# Patient Record
Sex: FEMALE | Race: WHITE | NOT HISPANIC OR LATINO
[De-identification: names, ages, dates, MRNs, and addresses within clinical notes are randomized per-mention and may not be internally consistent; named-entity substitution may affect disease eponyms.]

---

## 2018-01-22 ENCOUNTER — RECORD ABSTRACTING (OUTPATIENT)
Age: 53
End: 2018-01-22

## 2018-01-22 DIAGNOSIS — Z78.9 OTHER SPECIFIED HEALTH STATUS: ICD-10-CM

## 2018-01-22 DIAGNOSIS — R07.9 CHEST PAIN, UNSPECIFIED: ICD-10-CM

## 2018-01-22 RX ORDER — MONTELUKAST SODIUM 10 MG/1
10 TABLET, FILM COATED ORAL DAILY
Refills: 0 | Status: ACTIVE | COMMUNITY

## 2018-01-22 RX ORDER — ASPIRIN 81 MG
81 TABLET, DELAYED RELEASE (ENTERIC COATED) ORAL DAILY
Refills: 0 | Status: ACTIVE | COMMUNITY

## 2018-01-30 ENCOUNTER — APPOINTMENT (OUTPATIENT)
Dept: CARDIOLOGY | Facility: CLINIC | Age: 53
End: 2018-01-30
Payer: COMMERCIAL

## 2018-01-30 VITALS
DIASTOLIC BLOOD PRESSURE: 83 MMHG | RESPIRATION RATE: 12 BRPM | SYSTOLIC BLOOD PRESSURE: 144 MMHG | HEIGHT: 64 IN | WEIGHT: 293 LBS | HEART RATE: 76 BPM | BODY MASS INDEX: 50.02 KG/M2

## 2018-01-30 DIAGNOSIS — I44.4 LEFT ANTERIOR FASCICULAR BLOCK: ICD-10-CM

## 2018-01-30 DIAGNOSIS — Z78.9 OTHER SPECIFIED HEALTH STATUS: ICD-10-CM

## 2018-01-30 PROCEDURE — 99215 OFFICE O/P EST HI 40 MIN: CPT

## 2018-01-30 PROCEDURE — 93000 ELECTROCARDIOGRAM COMPLETE: CPT

## 2018-01-30 RX ORDER — AZITHROMYCIN 250 MG/1
250 TABLET, FILM COATED ORAL
Qty: 6 | Refills: 0 | Status: DISCONTINUED | COMMUNITY
Start: 2017-12-29

## 2018-01-30 RX ORDER — INSULIN LISPRO 100 [IU]/ML
INJECTION, SOLUTION INTRAVENOUS; SUBCUTANEOUS
Refills: 0 | Status: DISCONTINUED | COMMUNITY
End: 2018-01-30

## 2018-01-30 RX ORDER — PROMETHAZINE HYDROCHLORIDE AND CODEINE PHOSPHATE 6.25; 1 MG/5ML; MG/5ML
6.25-1 SOLUTION ORAL
Qty: 120 | Refills: 0 | Status: DISCONTINUED | COMMUNITY
Start: 2017-12-29

## 2018-01-30 RX ORDER — DOXYCYCLINE 100 MG/1
100 CAPSULE ORAL
Qty: 20 | Refills: 0 | Status: DISCONTINUED | COMMUNITY
Start: 2017-12-12

## 2018-01-30 RX ORDER — AMOXICILLIN AND CLAVULANATE POTASSIUM 875; 125 MG/1; MG/1
875-125 TABLET, COATED ORAL
Qty: 14 | Refills: 0 | Status: DISCONTINUED | COMMUNITY
Start: 2017-12-01

## 2018-01-30 RX ORDER — FLUTICASONE PROPIONATE AND SALMETEROL XINAFOATE 230; 21 UG/1; UG/1
AEROSOL, METERED RESPIRATORY (INHALATION)
Refills: 0 | Status: DISCONTINUED | COMMUNITY
End: 2018-01-30

## 2018-02-09 ENCOUNTER — APPOINTMENT (OUTPATIENT)
Dept: CARDIOLOGY | Facility: CLINIC | Age: 53
End: 2018-02-09
Payer: COMMERCIAL

## 2018-02-09 PROCEDURE — 93306 TTE W/DOPPLER COMPLETE: CPT

## 2018-02-15 ENCOUNTER — APPOINTMENT (OUTPATIENT)
Dept: CARDIOLOGY | Facility: CLINIC | Age: 53
End: 2018-02-15
Payer: COMMERCIAL

## 2018-02-15 PROCEDURE — A9500: CPT

## 2018-02-15 PROCEDURE — 78452 HT MUSCLE IMAGE SPECT MULT: CPT

## 2018-02-15 PROCEDURE — 93015 CV STRESS TEST SUPVJ I&R: CPT

## 2018-02-16 RX ORDER — KIT FOR THE PREPARATION OF TECHNETIUM TC99M SESTAMIBI 1 MG/5ML
INJECTION, POWDER, LYOPHILIZED, FOR SOLUTION PARENTERAL
Refills: 0 | Status: COMPLETED | OUTPATIENT
Start: 2018-02-16

## 2018-02-16 RX ADMIN — KIT FOR THE PREPARATION OF TECHNETIUM TC99M SESTAMIBI 0: 1 INJECTION, POWDER, LYOPHILIZED, FOR SOLUTION PARENTERAL at 00:00

## 2018-02-19 ENCOUNTER — APPOINTMENT (OUTPATIENT)
Dept: CARDIOLOGY | Facility: CLINIC | Age: 53
End: 2018-02-19
Payer: COMMERCIAL

## 2018-02-19 ENCOUNTER — APPOINTMENT (OUTPATIENT)
Dept: CARDIOLOGY | Facility: CLINIC | Age: 53
End: 2018-02-19

## 2018-02-19 PROCEDURE — ZZZZZ: CPT

## 2018-02-28 ENCOUNTER — APPOINTMENT (OUTPATIENT)
Dept: CARDIOLOGY | Facility: CLINIC | Age: 53
End: 2018-02-28
Payer: COMMERCIAL

## 2018-02-28 VITALS
HEART RATE: 98 BPM | WEIGHT: 293 LBS | DIASTOLIC BLOOD PRESSURE: 82 MMHG | RESPIRATION RATE: 14 BRPM | HEIGHT: 64 IN | SYSTOLIC BLOOD PRESSURE: 128 MMHG | BODY MASS INDEX: 50.02 KG/M2

## 2018-02-28 PROCEDURE — 99214 OFFICE O/P EST MOD 30 MIN: CPT

## 2018-02-28 RX ORDER — CLINDAMYCIN HYDROCHLORIDE 300 MG/1
300 CAPSULE ORAL
Qty: 21 | Refills: 0 | Status: DISCONTINUED | COMMUNITY
Start: 2018-01-24 | End: 2018-02-28

## 2018-07-28 PROBLEM — Z78.9 ALCOHOL USE: Status: ACTIVE | Noted: 2018-01-30

## 2018-07-31 ENCOUNTER — APPOINTMENT (OUTPATIENT)
Dept: CARDIOLOGY | Facility: CLINIC | Age: 53
End: 2018-07-31

## 2018-10-04 ENCOUNTER — APPOINTMENT (OUTPATIENT)
Dept: ENDOCRINOLOGY | Facility: CLINIC | Age: 53
End: 2018-10-04

## 2018-10-04 ENCOUNTER — RECORD ABSTRACTING (OUTPATIENT)
Age: 53
End: 2018-10-04

## 2018-10-04 ENCOUNTER — APPOINTMENT (OUTPATIENT)
Dept: CARDIOLOGY | Facility: CLINIC | Age: 53
End: 2018-10-04

## 2018-10-04 RX ORDER — MULTIVITAMIN
TABLET ORAL DAILY
Refills: 0 | Status: ACTIVE | COMMUNITY

## 2018-10-09 ENCOUNTER — MOBILE ON CALL (OUTPATIENT)
Age: 53
End: 2018-10-09

## 2018-10-16 ENCOUNTER — RESULT REVIEW (OUTPATIENT)
Age: 53
End: 2018-10-16

## 2018-10-16 ENCOUNTER — RX RENEWAL (OUTPATIENT)
Age: 53
End: 2018-10-16

## 2018-10-24 ENCOUNTER — RX RENEWAL (OUTPATIENT)
Age: 53
End: 2018-10-24

## 2018-11-14 ENCOUNTER — RX RENEWAL (OUTPATIENT)
Age: 53
End: 2018-11-14

## 2018-11-20 ENCOUNTER — MEDICATION RENEWAL (OUTPATIENT)
Age: 53
End: 2018-11-20

## 2018-11-28 ENCOUNTER — MEDICATION RENEWAL (OUTPATIENT)
Age: 53
End: 2018-11-28

## 2019-01-04 ENCOUNTER — RX RENEWAL (OUTPATIENT)
Age: 54
End: 2019-01-04

## 2019-01-15 ENCOUNTER — RX RENEWAL (OUTPATIENT)
Age: 54
End: 2019-01-15

## 2019-02-01 ENCOUNTER — MEDICATION RENEWAL (OUTPATIENT)
Age: 54
End: 2019-02-01

## 2019-02-08 ENCOUNTER — NON-APPOINTMENT (OUTPATIENT)
Age: 54
End: 2019-02-08

## 2019-02-08 ENCOUNTER — APPOINTMENT (OUTPATIENT)
Dept: CARDIOLOGY | Facility: CLINIC | Age: 54
End: 2019-02-08
Payer: COMMERCIAL

## 2019-02-08 VITALS
HEIGHT: 64 IN | HEART RATE: 68 BPM | DIASTOLIC BLOOD PRESSURE: 80 MMHG | RESPIRATION RATE: 14 BRPM | BODY MASS INDEX: 40.46 KG/M2 | SYSTOLIC BLOOD PRESSURE: 142 MMHG | WEIGHT: 237 LBS

## 2019-02-08 PROCEDURE — 93000 ELECTROCARDIOGRAM COMPLETE: CPT

## 2019-02-08 PROCEDURE — 99214 OFFICE O/P EST MOD 30 MIN: CPT

## 2019-02-08 RX ORDER — PEN NEEDLE, DIABETIC 29 G X1/2"
31G X 8 MM NEEDLE, DISPOSABLE MISCELLANEOUS
Refills: 0 | Status: DISCONTINUED | COMMUNITY
End: 2019-02-08

## 2019-02-08 RX ORDER — FLUTICASONE PROPIONATE AND SALMETEROL 50; 250 UG/1; UG/1
250-50 POWDER RESPIRATORY (INHALATION)
Qty: 180 | Refills: 0 | Status: DISCONTINUED | COMMUNITY
Start: 2017-09-26 | End: 2019-02-08

## 2019-02-08 RX ORDER — POTASSIUM CHLORIDE 750 MG/1
10 TABLET, FILM COATED, EXTENDED RELEASE ORAL DAILY
Refills: 0 | Status: DISCONTINUED | COMMUNITY
Start: 2017-09-26 | End: 2019-02-08

## 2019-02-08 RX ORDER — INSULIN DETEMIR 100 [IU]/ML
100 INJECTION, SOLUTION SUBCUTANEOUS
Refills: 0 | Status: DISCONTINUED | COMMUNITY
Start: 2017-08-23 | End: 2019-02-08

## 2019-02-08 RX ORDER — GLIMEPIRIDE 4 MG/1
4 TABLET ORAL TWICE DAILY
Refills: 0 | Status: DISCONTINUED | COMMUNITY
End: 2019-02-08

## 2019-02-08 RX ORDER — INSULIN LISPRO 100 [IU]/ML
100 INJECTION, SOLUTION INTRAVENOUS; SUBCUTANEOUS
Refills: 0 | Status: DISCONTINUED | COMMUNITY
Start: 2017-08-25 | End: 2019-02-08

## 2019-02-08 RX ORDER — FUROSEMIDE 40 MG/1
40 TABLET ORAL DAILY
Qty: 90 | Refills: 3 | Status: DISCONTINUED | COMMUNITY
Start: 2017-07-07 | End: 2019-02-08

## 2019-02-08 NOTE — ASSESSMENT
[FreeTextEntry1] : EKG shows normal sinus rhythm with left axis deviation, poor R wave progression across the anterior leads essentially unchanged from previous;\par \par In summary the patient is a 53-year-old woman with a prior long-standing history of obesity and status post gastric sleeve bariatric surgery one year ago who has loss of moderate amount of weight (60 pounds) she has had some nondescript anterior chest tightening which seems more anxiety musculoskeletal related and not particularly exertional related.\par \par Plan:\par \par  patient on the importance of getting back on structured nutritional weight loss plan and following up with weight loss surgeon\par \par Recommend patient continue to participate in exercise regimen at the gym for cardiovascular conditioning and reducing stress\par \par Followup in this office within 4-5 months or p.r.n.\par \par Patient to report any change or additional untoward chest symptoms between now and then\par \par They also decrease her enteric-coated aspirin to 3 times per week one tab;;;;

## 2019-02-08 NOTE — HISTORY OF PRESENT ILLNESS
[FreeTextEntry1] : She also notes that her blood pressure sometimes has been borderline high despite still taking medication for this. She discontinued was taken off of her diabetes medication after the weight loss;\par \par She reports she tried to get more serious with her diet again and starting to do some exercise in the gym\par \par nuclear stress test - preop in January 2018 was negative for ischemia;

## 2019-02-08 NOTE — REASON FOR VISIT
[Follow-Up - Clinic] : a clinic follow-up of [FreeTextEntry1] : The patient is a 53-year-old white female who has been evaluated in our office in the past for a long-standing history of morbid obesity for which she was cleared to undergo bariatric surgery approximately one year ago.;\par \par Fortunately, she has done well and has lost some 60 pounds but seems to have plateaued in her weight loss.\par \par She states she gets some intermittent anterior chest tightness-like feeling especially if she feels rushed or anxious very itchy has been under some increased stress lately at work and recently  from her boyfriend she reports;\par \par

## 2019-02-08 NOTE — PHYSICAL EXAM
[Normal Conjunctiva] : the conjunctiva exhibited no abnormalities [Eyelids - No Xanthelasma] : the eyelids demonstrated no xanthelasmas [Normal Oral Mucosa] : normal oral mucosa [No Oral Pallor] : no oral pallor [No Oral Cyanosis] : no oral cyanosis [Normal Jugular Venous A Waves Present] : normal jugular venous A waves present [Normal Jugular Venous V Waves Present] : normal jugular venous V waves present [No Jugular Venous Vaughn A Waves] : no jugular venous vaughn A waves [Respiration, Rhythm And Depth] : normal respiratory rhythm and effort [Exaggerated Use Of Accessory Muscles For Inspiration] : no accessory muscle use [Auscultation Breath Sounds / Voice Sounds] : lungs were clear to auscultation bilaterally [Veins - Varicosity Changes] : no varicosital changes were noted in the lower extremities [FreeTextEntry1] : Obese soft, nontender [Abnormal Walk] : normal gait [Gait - Sufficient For Exercise Testing] : the gait was sufficient for exercise testing [Nail Clubbing] : no clubbing of the fingernails [Cyanosis, Localized] : no localized cyanosis [Petechial Hemorrhages (___cm)] : no petechial hemorrhages [Skin Color & Pigmentation] : normal skin color and pigmentation [] : no rash [No Venous Stasis] : no venous stasis [Skin Lesions] : no skin lesions [No Skin Ulcers] : no skin ulcer [No Xanthoma] : no  xanthoma was observed [Oriented To Time, Place, And Person] : oriented to person, place, and time [Affect] : the affect was normal [Mood] : the mood was normal [No Anxiety] : not feeling anxious

## 2019-02-10 ENCOUNTER — RX RENEWAL (OUTPATIENT)
Age: 54
End: 2019-02-10

## 2019-02-25 ENCOUNTER — APPOINTMENT (OUTPATIENT)
Dept: ENDOCRINOLOGY | Facility: CLINIC | Age: 54
End: 2019-02-25

## 2019-03-19 ENCOUNTER — RECORD ABSTRACTING (OUTPATIENT)
Age: 54
End: 2019-03-19

## 2019-03-20 ENCOUNTER — APPOINTMENT (OUTPATIENT)
Dept: ENDOCRINOLOGY | Facility: CLINIC | Age: 54
End: 2019-03-20
Payer: COMMERCIAL

## 2019-03-20 ENCOUNTER — RESULT CHARGE (OUTPATIENT)
Age: 54
End: 2019-03-20

## 2019-03-20 VITALS
DIASTOLIC BLOOD PRESSURE: 80 MMHG | BODY MASS INDEX: 40.97 KG/M2 | SYSTOLIC BLOOD PRESSURE: 140 MMHG | WEIGHT: 240 LBS | HEIGHT: 64 IN | HEART RATE: 66 BPM

## 2019-03-20 DIAGNOSIS — E55.9 VITAMIN D DEFICIENCY, UNSPECIFIED: ICD-10-CM

## 2019-03-20 LAB — GLUCOSE BLDC GLUCOMTR-MCNC: 218

## 2019-03-20 PROCEDURE — 82962 GLUCOSE BLOOD TEST: CPT

## 2019-03-20 PROCEDURE — 99214 OFFICE O/P EST MOD 30 MIN: CPT | Mod: 25

## 2019-03-20 NOTE — HISTORY OF PRESENT ILLNESS
[FreeTextEntry1] : Pt here for follow up  T2 Dm HT high hcol \par s/p gastric sleeve for obesity \par \par HAs not been checking B S\par  hasn’t been watching diet or exercsing as well as she should \par meter broke \par no symptom of low BS

## 2019-03-20 NOTE — PHYSICAL EXAM
[Alert] : alert [No Acute Distress] : no acute distress [Well Nourished] : well nourished [Well Developed] : well developed [Normal Sclera/Conjunctiva] : normal sclera/conjunctiva [EOMI] : extra ocular movement intact [Thyroid Not Enlarged] : the thyroid was not enlarged [No Thyroid Nodules] : there were no palpable thyroid nodules [No Respiratory Distress] : no respiratory distress [No Accessory Muscle Use] : no accessory muscle use [Clear to Auscultation] : lungs were clear to auscultation bilaterally [Normal Rate] : heart rate was normal  [Normal S1, S2] : normal S1 and S2 [Regular Rhythm] : with a regular rhythm [Pedal Pulses Normal] : the pedal pulses are present [No Edema] : there was no peripheral edema [Post Cervical Nodes] : posterior cervical nodes [Anterior Cervical Nodes] : anterior cervical nodes [Normal] : normal and non tender [No Stigmata of Cushings Syndrome] : no stigmata of cushings syndrome [Normal Gait] : normal gait [Normal Strength/Tone] : muscle strength and tone were normal [No Rash] : no rash [Acanthosis Nigricans] : no acanthosis nigricans [Normal Reflexes] : deep tendon reflexes were 2+ and symmetric [No Tremors] : no tremors [Oriented x3] : oriented to person, place, and time

## 2019-03-20 NOTE — ASSESSMENT
[FreeTextEntry1] : T2DM - with A1c 8.1 suboptima control \par willrsume testing bid ac and qhs - \par cont MFN 1000 mg bid\par  see opthalmology\par \par Hypokalemia- reusme Klor 10 meq qd \par check repeat BMP in 2 weeks \par \par Hypothyroid OCnt RX \par low Vit D - cont 50 K per week \par proteinuira will repeat if incrsing will see renal \par \par low platelts- been seeing hematology

## 2019-03-27 ENCOUNTER — RX RENEWAL (OUTPATIENT)
Age: 54
End: 2019-03-27

## 2019-04-09 ENCOUNTER — RX RENEWAL (OUTPATIENT)
Age: 54
End: 2019-04-09

## 2019-06-24 ENCOUNTER — APPOINTMENT (OUTPATIENT)
Dept: CARDIOLOGY | Facility: CLINIC | Age: 54
End: 2019-06-24
Payer: COMMERCIAL

## 2019-06-24 PROCEDURE — 93306 TTE W/DOPPLER COMPLETE: CPT

## 2019-07-25 ENCOUNTER — APPOINTMENT (OUTPATIENT)
Dept: CARDIOLOGY | Facility: CLINIC | Age: 54
End: 2019-07-25
Payer: COMMERCIAL

## 2019-07-25 ENCOUNTER — NON-APPOINTMENT (OUTPATIENT)
Age: 54
End: 2019-07-25

## 2019-07-25 VITALS
HEART RATE: 77 BPM | DIASTOLIC BLOOD PRESSURE: 76 MMHG | BODY MASS INDEX: 40.97 KG/M2 | RESPIRATION RATE: 14 BRPM | SYSTOLIC BLOOD PRESSURE: 134 MMHG | HEIGHT: 64 IN | WEIGHT: 240 LBS

## 2019-07-25 DIAGNOSIS — R09.89 OTHER SPECIFIED SYMPTOMS AND SIGNS INVOLVING THE CIRCULATORY AND RESPIRATORY SYSTEMS: ICD-10-CM

## 2019-07-25 PROCEDURE — 99214 OFFICE O/P EST MOD 30 MIN: CPT

## 2019-07-25 PROCEDURE — 93000 ELECTROCARDIOGRAM COMPLETE: CPT

## 2019-07-25 RX ORDER — INSULIN LISPRO 100 [IU]/ML
100 INJECTION, SOLUTION INTRAVENOUS; SUBCUTANEOUS
Refills: 0 | Status: DISCONTINUED | COMMUNITY
End: 2019-07-25

## 2019-07-25 RX ORDER — POTASSIUM CHLORIDE 750 MG/1
10 TABLET, EXTENDED RELEASE ORAL
Refills: 0 | Status: DISCONTINUED | COMMUNITY
End: 2019-07-25

## 2019-07-25 RX ORDER — POTASSIUM CHLORIDE 1.5 G/1.58G
20 POWDER, FOR SOLUTION ORAL DAILY
Qty: 30 | Refills: 3 | Status: DISCONTINUED | COMMUNITY
Start: 2019-03-21 | End: 2019-07-25

## 2019-07-25 RX ORDER — POTASSIUM CHLORIDE 750 MG/1
10 TABLET, FILM COATED, EXTENDED RELEASE ORAL
Refills: 0 | Status: DISCONTINUED | COMMUNITY
End: 2019-07-25

## 2019-07-25 RX ORDER — LEVOTHYROXINE SODIUM 50 UG/1
50 TABLET ORAL
Qty: 90 | Refills: 0 | Status: DISCONTINUED | COMMUNITY
Start: 2018-10-24 | End: 2019-07-25

## 2019-07-25 RX ORDER — INSULIN DETEMIR 100 [IU]/ML
100 INJECTION, SOLUTION SUBCUTANEOUS
Refills: 0 | Status: DISCONTINUED | COMMUNITY
End: 2019-07-25

## 2019-07-25 NOTE — REASON FOR VISIT
[Follow-Up - Clinic] : a clinic follow-up of [FreeTextEntry1] : The patient is a 54-year-old white female with a known history for long-standing history of morbid obesity and prior bariatric surgery the lowest initially a moderate amount of weight and then somewhat plateaued. She presents in followup the office for general cardiac checkup. She has been treated for hypertension and hyperlipidemia ;\par \par Currently, she denies any significant symptoms of shortness of breath, palpitations or dizziness\par \par She describes some rather atypical upper right parasternal chest discomfort that comes and goes at times it is not particularly exertional related;;

## 2019-07-25 NOTE — PHYSICAL EXAM
[Normal Conjunctiva] : the conjunctiva exhibited no abnormalities [Eyelids - No Xanthelasma] : the eyelids demonstrated no xanthelasmas [Normal Oral Mucosa] : normal oral mucosa [No Oral Pallor] : no oral pallor [No Oral Cyanosis] : no oral cyanosis [Normal Jugular Venous A Waves Present] : normal jugular venous A waves present [Normal Jugular Venous V Waves Present] : normal jugular venous V waves present [Respiration, Rhythm And Depth] : normal respiratory rhythm and effort [Exaggerated Use Of Accessory Muscles For Inspiration] : no accessory muscle use [Auscultation Breath Sounds / Voice Sounds] : lungs were clear to auscultation bilaterally [Veins - Varicosity Changes] : no varicosital changes were noted in the lower extremities [FreeTextEntry1] : Obese soft, nontender [Abnormal Walk] : normal gait [Gait - Sufficient For Exercise Testing] : the gait was sufficient for exercise testing [Nail Clubbing] : no clubbing of the fingernails [Cyanosis, Localized] : no localized cyanosis [Petechial Hemorrhages (___cm)] : no petechial hemorrhages [Skin Color & Pigmentation] : normal skin color and pigmentation [] : no rash [No Venous Stasis] : no venous stasis [Skin Lesions] : no skin lesions [No Skin Ulcers] : no skin ulcer [No Xanthoma] : no  xanthoma was observed [Oriented To Time, Place, And Person] : oriented to person, place, and time [Affect] : the affect was normal [Mood] : the mood was normal [No Anxiety] : not feeling anxious

## 2019-07-25 NOTE — HISTORY OF PRESENT ILLNESS
[FreeTextEntry1] : She is tolerating her current medical regimen without difficulty\par \par She does not appear to be well motivated to lose any additional weight;\par \par Recent transthoracic echocardiogram demonstrated borderline left atrial enlargement, preserved LV size and systolic function with normal ejection fraction 65-70%, there was some mild to moderate MR and mild TR and PI also seen;\par \par ;

## 2019-07-25 NOTE — ASSESSMENT
[FreeTextEntry1] : EKG shows normal sinus rhythm at a rate of 77 with left anterior fascicular block and poor R-wave progression across the precordial leads essentially unchanged;\par \par In summary the patient is a 54-year-old woman with long-standing history of obesity and prior history of bariatric surgery with limited weight loss success and now plateauing with her weight\par \par She has had a stable cardiac pattern and atypical chest discomfort appears more musculoskeletal or arthritic;\par \par Plan:\par \par \par Patient recommended to follow  a more structured low-carb weight reducing diet as discussed at length today\par \par Recommend patient continue current medical regimen;\par \par Timely checkups and laboratory blood tests with primary care encouraged\par \par We'll schedule carotid duplex study sometime in the near future to rule out any significant carotid plaquing stenosis;\par \par Followup in this office as well within 4-5 months or p.r.n.;;;

## 2019-08-20 LAB
HBA1C MFR BLD HPLC: 8.1
LDLC SERPL DIRECT ASSAY-MCNC: 136
MICROALBUMIN/CREAT 24H UR-RTO: 282

## 2019-08-21 ENCOUNTER — APPOINTMENT (OUTPATIENT)
Dept: ENDOCRINOLOGY | Facility: CLINIC | Age: 54
End: 2019-08-21
Payer: COMMERCIAL

## 2019-11-06 ENCOUNTER — RX RENEWAL (OUTPATIENT)
Age: 54
End: 2019-11-06

## 2019-11-25 ENCOUNTER — RESULT REVIEW (OUTPATIENT)
Age: 54
End: 2019-11-25

## 2019-11-26 LAB
HBA1C MFR BLD HPLC: 9.5
LDLC SERPL DIRECT ASSAY-MCNC: 112
MICROALBUMIN/CREAT 24H UR-RTO: 22

## 2019-11-27 ENCOUNTER — APPOINTMENT (OUTPATIENT)
Dept: ENDOCRINOLOGY | Facility: CLINIC | Age: 54
End: 2019-11-27
Payer: COMMERCIAL

## 2019-11-27 ENCOUNTER — RESULT CHARGE (OUTPATIENT)
Age: 54
End: 2019-11-27

## 2019-11-27 VITALS — HEIGHT: 64 IN | HEART RATE: 73 BPM | SYSTOLIC BLOOD PRESSURE: 140 MMHG | DIASTOLIC BLOOD PRESSURE: 80 MMHG

## 2019-11-27 PROCEDURE — 99214 OFFICE O/P EST MOD 30 MIN: CPT | Mod: 25

## 2019-11-27 PROCEDURE — 82962 GLUCOSE BLOOD TEST: CPT

## 2019-11-27 RX ORDER — FUROSEMIDE 40 MG/1
40 TABLET ORAL
Refills: 0 | Status: DISCONTINUED | COMMUNITY
End: 2019-11-27

## 2019-11-28 LAB — GLUCOSE BLDC GLUCOMTR-MCNC: 167

## 2019-11-29 NOTE — PHYSICAL EXAM
[Alert] : alert [No Acute Distress] : no acute distress [Well Nourished] : well nourished [Well Developed] : well developed [EOMI] : extra ocular movement intact [Normal Sclera/Conjunctiva] : normal sclera/conjunctiva [Thyroid Not Enlarged] : the thyroid was not enlarged [No Thyroid Nodules] : there were no palpable thyroid nodules [No Respiratory Distress] : no respiratory distress [No Accessory Muscle Use] : no accessory muscle use [Clear to Auscultation] : lungs were clear to auscultation bilaterally [Normal Rate] : heart rate was normal  [Regular Rhythm] : with a regular rhythm [Normal S1, S2] : normal S1 and S2 [Pedal Pulses Normal] : the pedal pulses are present [No Edema] : there was no peripheral edema [Post Cervical Nodes] : posterior cervical nodes [Anterior Cervical Nodes] : anterior cervical nodes [Normal] : normal and non tender [No Stigmata of Cushings Syndrome] : no stigmata of cushings syndrome [Normal Gait] : normal gait [Normal Strength/Tone] : muscle strength and tone were normal [No Rash] : no rash [Acanthosis Nigricans] : no acanthosis nigricans [Normal Reflexes] : deep tendon reflexes were 2+ and symmetric [Oriented x3] : oriented to person, place, and time [No Tremors] : no tremors

## 2019-11-29 NOTE — ASSESSMENT
[FreeTextEntry1] : T2DM - with A1c 8.1 suboptima control \par willrsume testing bid ac and qhs - \par cont MFN 1000 mg bid\par  see opthalmology\par \par Hypokalemia- reusme Klor 8 meq qd \par check repeat BMP in 2 weeks \par \par Hypothyroid OCnt RX \par low Vit D - cont 50 K per week \par proteinuira will repeat if incrsing will see renal \par \par low platelts- been seeing hematology

## 2020-01-06 ENCOUNTER — APPOINTMENT (OUTPATIENT)
Dept: CARDIOLOGY | Facility: CLINIC | Age: 55
End: 2020-01-06
Payer: COMMERCIAL

## 2020-01-06 PROCEDURE — 93880 EXTRACRANIAL BILAT STUDY: CPT

## 2020-01-09 ENCOUNTER — APPOINTMENT (OUTPATIENT)
Dept: CARDIOLOGY | Facility: CLINIC | Age: 55
End: 2020-01-09

## 2020-01-31 ENCOUNTER — OUTPATIENT (OUTPATIENT)
Dept: OUTPATIENT SERVICES | Facility: HOSPITAL | Age: 55
LOS: 1 days | End: 2020-01-31
Payer: COMMERCIAL

## 2020-01-31 VITALS
OXYGEN SATURATION: 97 % | HEIGHT: 64 IN | HEART RATE: 73 BPM | TEMPERATURE: 98 F | DIASTOLIC BLOOD PRESSURE: 83 MMHG | RESPIRATION RATE: 18 BRPM | WEIGHT: 231.93 LBS | SYSTOLIC BLOOD PRESSURE: 135 MMHG

## 2020-01-31 DIAGNOSIS — E11.9 TYPE 2 DIABETES MELLITUS WITHOUT COMPLICATIONS: ICD-10-CM

## 2020-01-31 DIAGNOSIS — N84.0 POLYP OF CORPUS UTERI: ICD-10-CM

## 2020-01-31 DIAGNOSIS — Z90.89 ACQUIRED ABSENCE OF OTHER ORGANS: Chronic | ICD-10-CM

## 2020-01-31 DIAGNOSIS — Z98.84 BARIATRIC SURGERY STATUS: Chronic | ICD-10-CM

## 2020-01-31 LAB
ANION GAP SERPL CALC-SCNC: 12 MMOL/L — SIGNIFICANT CHANGE UP (ref 5–17)
BUN SERPL-MCNC: 18 MG/DL — SIGNIFICANT CHANGE UP (ref 7–23)
CALCIUM SERPL-MCNC: 9.3 MG/DL — SIGNIFICANT CHANGE UP (ref 8.4–10.5)
CHLORIDE SERPL-SCNC: 99 MMOL/L — SIGNIFICANT CHANGE UP (ref 96–108)
CO2 SERPL-SCNC: 27 MMOL/L — SIGNIFICANT CHANGE UP (ref 22–31)
CREAT SERPL-MCNC: 0.61 MG/DL — SIGNIFICANT CHANGE UP (ref 0.5–1.3)
GLUCOSE SERPL-MCNC: 300 MG/DL — HIGH (ref 70–99)
HBA1C BLD-MCNC: 9.2 % — HIGH (ref 4–5.6)
HCT VFR BLD CALC: 39.9 % — SIGNIFICANT CHANGE UP (ref 34.5–45)
HGB BLD-MCNC: 13.2 G/DL — SIGNIFICANT CHANGE UP (ref 11.5–15.5)
MCHC RBC-ENTMCNC: 28.8 PG — SIGNIFICANT CHANGE UP (ref 27–34)
MCHC RBC-ENTMCNC: 33.1 GM/DL — SIGNIFICANT CHANGE UP (ref 32–36)
MCV RBC AUTO: 86.9 FL — SIGNIFICANT CHANGE UP (ref 80–100)
NRBC # BLD: 0 /100 WBCS — SIGNIFICANT CHANGE UP (ref 0–0)
PLATELET # BLD AUTO: 176 K/UL — SIGNIFICANT CHANGE UP (ref 150–400)
POTASSIUM SERPL-MCNC: 3.5 MMOL/L — SIGNIFICANT CHANGE UP (ref 3.5–5.3)
POTASSIUM SERPL-SCNC: 3.5 MMOL/L — SIGNIFICANT CHANGE UP (ref 3.5–5.3)
RBC # BLD: 4.59 M/UL — SIGNIFICANT CHANGE UP (ref 3.8–5.2)
RBC # FLD: 12.4 % — SIGNIFICANT CHANGE UP (ref 10.3–14.5)
SODIUM SERPL-SCNC: 138 MMOL/L — SIGNIFICANT CHANGE UP (ref 135–145)
WBC # BLD: 5.05 K/UL — SIGNIFICANT CHANGE UP (ref 3.8–10.5)
WBC # FLD AUTO: 5.05 K/UL — SIGNIFICANT CHANGE UP (ref 3.8–10.5)

## 2020-01-31 PROCEDURE — 83036 HEMOGLOBIN GLYCOSYLATED A1C: CPT

## 2020-01-31 PROCEDURE — 85027 COMPLETE CBC AUTOMATED: CPT

## 2020-01-31 PROCEDURE — G0463: CPT

## 2020-01-31 PROCEDURE — 80048 BASIC METABOLIC PNL TOTAL CA: CPT

## 2020-01-31 NOTE — H&P PST ADULT - HISTORY OF PRESENT ILLNESS
53 y/o F PMH 53 y/o F PMH morbid obesity, S/P vertical sleeve gastrectomy 2018, diabetes, states "I think my last A1c was 9 or 10", mild AQUILES, does not wear CPAP, endometrial hyperplasia.  Presents today for D+C, operative hysteroscopy, removal of endometrial polyp.

## 2020-01-31 NOTE — H&P PST ADULT - NSICDXPROBLEM_GEN_ALL_CORE_FT
PROBLEM DIAGNOSES  Problem: Polyp of corpus uteri  Assessment and Plan: D+C., operative hysteroscopy, removal of endometrial polyp.  Will continue aspirin    Problem: Diabetes  Assessment and Plan: Instructed to hold metformin 24 hours prior to pro

## 2020-01-31 NOTE — H&P PST ADULT - NSICDXPASTMEDICALHX_GEN_ALL_CORE_FT
PAST MEDICAL HISTORY:  Asthma well controlled    Depression     Diabetes     HTN (hypertension)     Hypokalemia     Hypothyroid     Morbidly obese PAST MEDICAL HISTORY:  Asthma well controlled    Depression     Diabetes     HTN (hypertension)     Hypokalemia     Hypothyroid     Morbidly obese     AQUILES (obstructive sleep apnea)

## 2020-03-16 ENCOUNTER — APPOINTMENT (OUTPATIENT)
Dept: CARDIOLOGY | Facility: CLINIC | Age: 55
End: 2020-03-16

## 2020-03-26 ENCOUNTER — RX RENEWAL (OUTPATIENT)
Age: 55
End: 2020-03-26

## 2020-04-23 ENCOUNTER — RX RENEWAL (OUTPATIENT)
Age: 55
End: 2020-04-23

## 2020-04-24 ENCOUNTER — APPOINTMENT (OUTPATIENT)
Dept: ENDOCRINOLOGY | Facility: CLINIC | Age: 55
End: 2020-04-24
Payer: COMMERCIAL

## 2020-04-24 PROCEDURE — 99442: CPT

## 2020-04-29 PROBLEM — E66.01 MORBID (SEVERE) OBESITY DUE TO EXCESS CALORIES: Chronic | Status: ACTIVE | Noted: 2020-01-31

## 2020-04-29 PROBLEM — E87.6 HYPOKALEMIA: Chronic | Status: ACTIVE | Noted: 2020-01-31

## 2020-04-29 PROBLEM — J45.909 UNSPECIFIED ASTHMA, UNCOMPLICATED: Chronic | Status: ACTIVE | Noted: 2020-01-31

## 2020-04-29 PROBLEM — F32.9 MAJOR DEPRESSIVE DISORDER, SINGLE EPISODE, UNSPECIFIED: Chronic | Status: ACTIVE | Noted: 2020-01-31

## 2020-04-29 PROBLEM — G47.33 OBSTRUCTIVE SLEEP APNEA (ADULT) (PEDIATRIC): Chronic | Status: ACTIVE | Noted: 2020-01-31

## 2020-04-29 PROBLEM — E03.9 HYPOTHYROIDISM, UNSPECIFIED: Chronic | Status: ACTIVE | Noted: 2020-01-31

## 2020-05-05 ENCOUNTER — RX RENEWAL (OUTPATIENT)
Age: 55
End: 2020-05-05

## 2020-07-27 ENCOUNTER — RX RENEWAL (OUTPATIENT)
Age: 55
End: 2020-07-27

## 2020-08-10 RX ORDER — ERGOCALCIFEROL 1.25 MG/1
1.25 MG CAPSULE, LIQUID FILLED ORAL
Qty: 12 | Refills: 1 | Status: DISCONTINUED | COMMUNITY
Start: 2017-10-05 | End: 2020-08-10

## 2020-08-10 RX ORDER — LANCETS 33 GAUGE
EACH MISCELLANEOUS
Qty: 300 | Refills: 1 | Status: DISCONTINUED | COMMUNITY
Start: 2019-03-21 | End: 2020-08-10

## 2020-09-08 LAB
HBA1C MFR BLD HPLC: 6.1
LDLC SERPL DIRECT ASSAY-MCNC: 114
MICROALBUMIN/CREAT 24H UR-RTO: 14

## 2020-09-09 ENCOUNTER — APPOINTMENT (OUTPATIENT)
Dept: ENDOCRINOLOGY | Facility: CLINIC | Age: 55
End: 2020-09-09
Payer: COMMERCIAL

## 2020-09-09 ENCOUNTER — RESULT CHARGE (OUTPATIENT)
Age: 55
End: 2020-09-09

## 2020-09-09 VITALS — DIASTOLIC BLOOD PRESSURE: 78 MMHG | HEART RATE: 79 BPM | SYSTOLIC BLOOD PRESSURE: 138 MMHG

## 2020-09-09 VITALS — WEIGHT: 248 LBS | HEIGHT: 64 IN | BODY MASS INDEX: 42.34 KG/M2

## 2020-09-09 PROCEDURE — 82962 GLUCOSE BLOOD TEST: CPT

## 2020-09-09 PROCEDURE — 99214 OFFICE O/P EST MOD 30 MIN: CPT | Mod: 25

## 2020-09-10 LAB — GLUCOSE BLDC GLUCOMTR-MCNC: 113

## 2020-09-13 NOTE — PHYSICAL EXAM
[Alert] : alert [Well Nourished] : well nourished [No Acute Distress] : no acute distress [Well Developed] : well developed [Normal Sclera/Conjunctiva] : normal sclera/conjunctiva [EOMI] : extra ocular movement intact [No Proptosis] : no proptosis [No Thyroid Nodules] : no palpable thyroid nodules [No Respiratory Distress] : no respiratory distress [No Accessory Muscle Use] : no accessory muscle use [Clear to Auscultation] : lungs were clear to auscultation bilaterally [Normal S1, S2] : normal S1 and S2 [Normal Rate] : heart rate was normal [Regular Rhythm] : with a regular rhythm [No Edema] : no peripheral edema [Pedal Pulses Normal] : the pedal pulses are present [Normal Anterior Cervical Nodes] : no anterior cervical lymphadenopathy [Normal Posterior Cervical Nodes] : no posterior cervical lymphadenopathy [No Spinal Tenderness] : no spinal tenderness [Spine Straight] : spine straight [No Stigmata of Cushings Syndrome] : no stigmata of Cushings Syndrome [Normal Gait] : normal gait [Normal Strength/Tone] : muscle strength and tone were normal [No Rash] : no rash [Acanthosis Nigricans] : no acanthosis nigricans [Right Foot Was Examined] : right foot ~C was examined [Left Foot Was Examined] : left foot ~C was examined [Normal] : normal [Full ROM] : with full range of motion [Diminished Throughout Both Feet] : normal tactile sensation with monofilament testing throughout both feet [Normal Reflexes] : deep tendon reflexes were 2+ and symmetric [No Tremors] : no tremors [Oriented x3] : oriented to person, place, and time [de-identified] : mild thyromegaly

## 2020-09-13 NOTE — HISTORY OF PRESENT ILLNESS
[FreeTextEntry1] : Phone visit done  due to COVID 19 \par  Pt consent obtained for phone visit \par \par Time started:400pm\par Time ended :411pm \par \par HPI\par \par Follow up for \par \par T2DM on  amaryl 2 mg bid\par  MFN 1000 mg bid \par \par \par Hypothyroidism on LT4 0.05 mg qd \par Low Vit D on 50 K per week \par \par \par Glucose ranges:\par 130-150 \par Hypoglycemia :\par none \par \par Follow ups:\par \par Opthalmology\par Podiatry  \par Cardiology has seen cardiollogy  about 1 motnht ago \par  pt had been gettign CP at bed \par Renal \par \par ROS No Neck pain No voice changes  No Chest pain  No Pressure  No palpitations No dyspnea   No n/v abd pain diarrhea or constipation  rare LE edema \par \par Labs Reviewed \par \par \par Imaging reviewed\par \par \par Imp/Plan \par \par \par T2Dm \par contorl improving \par  cont current RX\par  cont checking BS \par  follow up t2DM \par Hypothryoid  \par  Low Vit  D \par \par has been doing better trying to watch diet and exercise \par \par fell down steps about1-2 weeks ago  thinks hit back of head   was wearign shoes that had a wet bottom \par \par getting better  no LOC \par \par \par BS have been ok -  excep aroun 4-5 PM  feels  lw sometimes

## 2020-09-13 NOTE — ASSESSMENT
[FreeTextEntry1] : T2Dm  m\par much improved contreol with some PM Low BS\par reduce aamryl  to 1/2 tab Sharifa '\par  cont full tab in PM\par  cont  MFN  1000  mg bid \par \par check sono  Jan 2021\par \par \par  Hypothrydoi Cont RX  0.05 MG SYnthoroid \par \par Low B12 cont RX \par \par Low Vit D cont RX \par \par  opjhthal - needs follow up \par Low Vit D cont  50 K per week \par chol- see card

## 2020-09-23 ENCOUNTER — NON-APPOINTMENT (OUTPATIENT)
Age: 55
End: 2020-09-23

## 2020-09-23 ENCOUNTER — APPOINTMENT (OUTPATIENT)
Dept: CARDIOLOGY | Facility: CLINIC | Age: 55
End: 2020-09-23
Payer: COMMERCIAL

## 2020-09-23 VITALS
DIASTOLIC BLOOD PRESSURE: 70 MMHG | BODY MASS INDEX: 42.34 KG/M2 | RESPIRATION RATE: 14 BRPM | SYSTOLIC BLOOD PRESSURE: 130 MMHG | HEART RATE: 67 BPM | WEIGHT: 248 LBS | TEMPERATURE: 97.5 F | HEIGHT: 64 IN

## 2020-09-23 PROCEDURE — 93000 ELECTROCARDIOGRAM COMPLETE: CPT

## 2020-09-23 PROCEDURE — 99214 OFFICE O/P EST MOD 30 MIN: CPT

## 2020-09-23 RX ORDER — CLOTRIMAZOLE AND BETAMETHASONE DIPROPIONATE 10; .5 MG/G; MG/G
1-0.05 CREAM TOPICAL
Qty: 15 | Refills: 0 | Status: DISCONTINUED | COMMUNITY
Start: 2016-12-05 | End: 2020-09-23

## 2020-09-23 NOTE — HISTORY OF PRESENT ILLNESS
[FreeTextEntry1] : She is tolerating her current medical regimen without difficulty\par \par She does not appear to be well motivated to lose any additional weight although she is interested in eating a more well balanced diet and begin a modest exercise regimen.\par \par Mrs. Call is following up with her PCP regularly and completing routine blood work.  Her cholesterol levels are stable and her HgA1C is now controlled on Metformin and Glimepiride.

## 2020-09-23 NOTE — REASON FOR VISIT
[FreeTextEntry1] : MICHAEL CRISOSTOMO is being seen for a clinic follow-up of. \par \par The patient is a 55-year-old white female with a known history for long-standing history of morbid obesity and prior bariatric surgery the lowest initially a moderate amount of weight and then somewhat plateaued. She presents in followup the office for general cardiac checkup. She has been treated for hypertension and hyperlipidemia ;\par \par Currently, she denies any significant symptoms of chest pain, shortness of breath, palpitations or dizziness.

## 2020-09-23 NOTE — ASSESSMENT
[FreeTextEntry1] : EKG 9/23/2020:  The EKG illustrates sinus rhythm, rate of 67 bpm, left atrial enlargement pattern, left anterior fascicular block, poor R wave progression V1 to V3. \par \par carotid doppler study January 2020 was negative for any significant atherosclerotic plaquing. \par \par nuclear stress test - preop in January 2018 was negative for ischemia; \par \par

## 2020-09-23 NOTE — DISCUSSION/SUMMARY
[FreeTextEntry1] : 1).  She will complete a Transthoracic Echocardiogram in the near future to assess cardiac function and valvulopathy.\par \par 2).  Patient is tolerating cardiac medications without negative side effects, continue with current cardiac medication regimen (refer above).\par \par 3).  Counseled patient on diet and lifestyle modification including low fat and low carbohydrate weight reducing diet.  She is to implement a modest aerobic exercise regimen few days per week. \par \par 4).  Follow up with PCP (Chio Hernandes) regarding routine checkups and blood work including fasting lipid panel, have copy faxed to our office. \par \par 5).  Recommend patient report any untoward symptoms. \par \par 6).  Follow up with our office in 5 to 6 months or PRN.

## 2020-09-23 NOTE — PHYSICAL EXAM
[Normal Appearance] : normal appearance [General Appearance - In No Acute Distress] : no acute distress [Normal Conjunctiva] : the conjunctiva exhibited no abnormalities [Normal Oral Mucosa] : normal oral mucosa [Normal Jugular Venous A Waves Present] : normal jugular venous A waves present [Normal Jugular Venous V Waves Present] : normal jugular venous V waves present [No Jugular Venous Vaughn A Waves] : no jugular venous vaughn A waves [] : no respiratory distress [Respiration, Rhythm And Depth] : normal respiratory rhythm and effort [Auscultation Breath Sounds / Voice Sounds] : lungs were clear to auscultation bilaterally [Heart Rate And Rhythm] : heart rate and rhythm were normal [Heart Sounds] : normal S1 and S2 [Edema] : no peripheral edema present [Bowel Sounds] : normal bowel sounds [Abnormal Walk] : normal gait [Nail Clubbing] : no clubbing of the fingernails [Cyanosis, Localized] : no localized cyanosis [Skin Color & Pigmentation] : normal skin color and pigmentation [Oriented To Time, Place, And Person] : oriented to person, place, and time [Impaired Insight] : insight and judgment were intact [Affect] : the affect was normal [FreeTextEntry1] : Grade II/VI systolic murmur

## 2020-10-09 ENCOUNTER — RX RENEWAL (OUTPATIENT)
Age: 55
End: 2020-10-09

## 2020-11-05 ENCOUNTER — RX RENEWAL (OUTPATIENT)
Age: 55
End: 2020-11-05

## 2020-11-16 ENCOUNTER — RESULT REVIEW (OUTPATIENT)
Age: 55
End: 2020-11-16

## 2020-11-30 ENCOUNTER — APPOINTMENT (OUTPATIENT)
Dept: CARDIOLOGY | Facility: CLINIC | Age: 55
End: 2020-11-30
Payer: COMMERCIAL

## 2020-11-30 PROCEDURE — 93306 TTE W/DOPPLER COMPLETE: CPT

## 2020-11-30 PROCEDURE — 99072 ADDL SUPL MATRL&STAF TM PHE: CPT

## 2020-12-30 ENCOUNTER — RX RENEWAL (OUTPATIENT)
Age: 55
End: 2020-12-30

## 2021-01-25 ENCOUNTER — RX RENEWAL (OUTPATIENT)
Age: 56
End: 2021-01-25

## 2021-02-23 ENCOUNTER — NON-APPOINTMENT (OUTPATIENT)
Age: 56
End: 2021-02-23

## 2021-02-24 ENCOUNTER — APPOINTMENT (OUTPATIENT)
Dept: ENDOCRINOLOGY | Facility: CLINIC | Age: 56
End: 2021-02-24

## 2021-02-26 LAB
HBA1C MFR BLD HPLC: 6.4
LDLC SERPL DIRECT ASSAY-MCNC: 69
MICROALBUMIN/CREAT 24H UR-RTO: NORMAL

## 2021-03-01 ENCOUNTER — APPOINTMENT (OUTPATIENT)
Dept: ENDOCRINOLOGY | Facility: CLINIC | Age: 56
End: 2021-03-01
Payer: COMMERCIAL

## 2021-03-01 PROCEDURE — 99213 OFFICE O/P EST LOW 20 MIN: CPT | Mod: 95

## 2021-03-01 RX ORDER — LOSARTAN POTASSIUM 100 MG/1
100 TABLET, FILM COATED ORAL DAILY
Refills: 0 | Status: DISCONTINUED | COMMUNITY
End: 2021-03-01

## 2021-03-01 NOTE — PHYSICAL EXAM
[Alert] : alert [Well Nourished] : well nourished [No Acute Distress] : no acute distress [Well Developed] : well developed [Normal Sclera/Conjunctiva] : normal sclera/conjunctiva [EOMI] : extra ocular movement intact [No Proptosis] : no proptosis [No Thyroid Nodules] : no palpable thyroid nodules [No Respiratory Distress] : no respiratory distress [No Accessory Muscle Use] : no accessory muscle use [Clear to Auscultation] : lungs were clear to auscultation bilaterally [Normal S1, S2] : normal S1 and S2 [Normal Rate] : heart rate was normal [Regular Rhythm] : with a regular rhythm [No Edema] : no peripheral edema [Pedal Pulses Normal] : the pedal pulses are present [Normal Anterior Cervical Nodes] : no anterior cervical lymphadenopathy [No Spinal Tenderness] : no spinal tenderness [Spine Straight] : spine straight [No Stigmata of Cushings Syndrome] : no stigmata of Cushings Syndrome [Normal Gait] : normal gait [Normal Strength/Tone] : muscle strength and tone were normal [No Rash] : no rash [Acanthosis Nigricans] : no acanthosis nigricans [Right Foot Was Examined] : right foot ~C was examined [Left Foot Was Examined] : left foot ~C was examined [Normal] : normal [Full ROM] : with full range of motion [Diminished Throughout Both Feet] : normal tactile sensation with monofilament testing throughout both feet [Normal Reflexes] : deep tendon reflexes were 2+ and symmetric [No Tremors] : no tremors [Oriented x3] : oriented to person, place, and time [de-identified] : mild thyromegaly

## 2021-03-01 NOTE — HISTORY OF PRESENT ILLNESS
[FreeTextEntry1] : Telehealth  visit done  due to COVID 19 \par  Pt consent obtained for phone visit \par \par Time started 148 pm\par Time ended :202pm \par \par HPI\par \par Follow up for \par \par T2DM on  amaryl 2 mg bid\par  MFN 1000 mg bid \par Patient had received the first vaccine for Covid, had some fevers afterwards\par Patient was given prescription for Augmentin for her possible urine infection, she did not started as she has allergy to amoxicillin, she will get repeat urine studies done this week for her primary doctor\par \par Hypothyroidism on LT4 0.05 mg qd \par Low Vit D on 50 K per week \par \par \par Glucose ranges:\par not testing really \par \par Hypoglycemia :\par 1x when did not eat ontime \par Follow ups:\par \par Opthalmology  UTD  asw opthalmology Nov 2020 \par Podiatry  \par Cardiology had seen cardiology because at the end of December patient went to walk-in clinic for sinus infection was found to have a very elevated blood pressure and was sent to the emergency room.  She went to [place].  As a result she was started on losartan HCT instead of plain losartan.  As well as  rosuvastatin 10 mg once a day\par \par Renal \par \par ROS No Neck pain No voice changes  No Chest pain  No Pressure  No palpitations No dyspnea   No n/v abd pain diarrhea or constipation  rare LE edema \par \par Labs Reviewed \par Feb 2021\par \par Imaging reviewed\par \par \par

## 2021-03-01 NOTE — REASON FOR VISIT
[Home] : at home, [unfilled] , at the time of the visit. [Medical Office: ([facility])___] : at the medical office located in  [Verbal consent obtained from patient] : the patient, [unfilled]

## 2021-03-01 NOTE — ASSESSMENT
[FreeTextEntry1] : T2Dm  m\par much improved contreol with some PM Low BS\par rcont amaryl 2mg bid \par  cont  MFN  1000  mg bid \par \par \par \par \par  Hypothrydoi Cont RX  0.05 MG SYnthoroid \par stable MNG \par check sono f feb 2022\par \par Low B12 cont RX \par \par Low Vit D cont RX  50K per week \par \par low potassium cont Kdur 8 meq \par on Losartan HCT now \par \par abnl UA- see PMD for foolow up \par  opjhthal - srtable \par \par chol-cont rosuvastatin

## 2021-03-15 ENCOUNTER — APPOINTMENT (OUTPATIENT)
Dept: CARDIOLOGY | Facility: CLINIC | Age: 56
End: 2021-03-15

## 2021-03-29 ENCOUNTER — NON-APPOINTMENT (OUTPATIENT)
Age: 56
End: 2021-03-29

## 2021-04-05 ENCOUNTER — ASOB RESULT (OUTPATIENT)
Age: 56
End: 2021-04-05

## 2021-04-05 ENCOUNTER — APPOINTMENT (OUTPATIENT)
Dept: OBGYN | Facility: CLINIC | Age: 56
End: 2021-04-05

## 2021-04-05 ENCOUNTER — APPOINTMENT (OUTPATIENT)
Dept: OBGYN | Facility: CLINIC | Age: 56
End: 2021-04-05
Payer: COMMERCIAL

## 2021-04-05 PROCEDURE — 99072 ADDL SUPL MATRL&STAF TM PHE: CPT

## 2021-04-05 PROCEDURE — 76856 US EXAM PELVIC COMPLETE: CPT | Mod: 59

## 2021-04-05 PROCEDURE — 76830 TRANSVAGINAL US NON-OB: CPT

## 2021-04-27 ENCOUNTER — RX RENEWAL (OUTPATIENT)
Age: 56
End: 2021-04-27

## 2021-05-16 ENCOUNTER — RX RENEWAL (OUTPATIENT)
Age: 56
End: 2021-05-16

## 2021-06-07 ENCOUNTER — APPOINTMENT (OUTPATIENT)
Dept: OBGYN | Facility: CLINIC | Age: 56
End: 2021-06-07
Payer: COMMERCIAL

## 2021-06-07 ENCOUNTER — ASOB RESULT (OUTPATIENT)
Age: 56
End: 2021-06-07

## 2021-06-07 DIAGNOSIS — N84.0 POLYP OF CORPUS UTERI: ICD-10-CM

## 2021-06-07 PROCEDURE — 58340 CATHETER FOR HYSTEROGRAPHY: CPT

## 2021-06-07 PROCEDURE — 99072 ADDL SUPL MATRL&STAF TM PHE: CPT

## 2021-06-07 PROCEDURE — 76831 ECHO EXAM UTERUS: CPT

## 2021-06-07 NOTE — PLAN
[FreeTextEntry1] : sonohyst shows endometrial polyp\par \par 1. sched op hyst, removal of endom polyp, D&C ASAP\par 2. med clearance

## 2021-06-07 NOTE — HISTORY OF PRESENT ILLNESS
[FreeTextEntry1] : here for sonohyst-hx of endometrial polyp and had last year but canceled due to DM -glucose 300 and not getting cleared for surgery and not sched due to covid

## 2021-06-07 NOTE — PROCEDURE
[Saline Infusion Sonography] : saline infusion sonography [FreeTextEntry3] : showed 2cm endom polyp anterior [place]

## 2021-06-18 LAB
HBA1C MFR BLD HPLC: 7.6
LDLC SERPL DIRECT ASSAY-MCNC: 89
MICROALBUMIN/CREAT 24H UR-RTO: 10

## 2021-06-21 ENCOUNTER — APPOINTMENT (OUTPATIENT)
Dept: ENDOCRINOLOGY | Facility: CLINIC | Age: 56
End: 2021-06-21
Payer: COMMERCIAL

## 2021-06-21 VITALS
HEIGHT: 64 IN | WEIGHT: 248 LBS | OXYGEN SATURATION: 98 % | BODY MASS INDEX: 42.34 KG/M2 | SYSTOLIC BLOOD PRESSURE: 130 MMHG | HEART RATE: 77 BPM | DIASTOLIC BLOOD PRESSURE: 80 MMHG

## 2021-06-21 LAB — GLUCOSE BLDC GLUCOMTR-MCNC: 272

## 2021-06-21 PROCEDURE — 99214 OFFICE O/P EST MOD 30 MIN: CPT | Mod: 25

## 2021-06-21 PROCEDURE — 99072 ADDL SUPL MATRL&STAF TM PHE: CPT

## 2021-06-21 PROCEDURE — 82962 GLUCOSE BLOOD TEST: CPT

## 2021-06-24 ENCOUNTER — NON-APPOINTMENT (OUTPATIENT)
Age: 56
End: 2021-06-24

## 2021-06-25 ENCOUNTER — OUTPATIENT (OUTPATIENT)
Dept: OUTPATIENT SERVICES | Facility: HOSPITAL | Age: 56
LOS: 1 days | End: 2021-06-25
Payer: COMMERCIAL

## 2021-06-25 VITALS
HEIGHT: 64 IN | DIASTOLIC BLOOD PRESSURE: 91 MMHG | OXYGEN SATURATION: 98 % | RESPIRATION RATE: 14 BRPM | WEIGHT: 248.02 LBS | SYSTOLIC BLOOD PRESSURE: 159 MMHG | HEART RATE: 68 BPM | TEMPERATURE: 98 F

## 2021-06-25 DIAGNOSIS — Z98.84 BARIATRIC SURGERY STATUS: Chronic | ICD-10-CM

## 2021-06-25 DIAGNOSIS — Z87.59 PERSONAL HISTORY OF OTHER COMPLICATIONS OF PREGNANCY, CHILDBIRTH AND THE PUERPERIUM: Chronic | ICD-10-CM

## 2021-06-25 DIAGNOSIS — Z01.818 ENCOUNTER FOR OTHER PREPROCEDURAL EXAMINATION: ICD-10-CM

## 2021-06-25 DIAGNOSIS — Z90.89 ACQUIRED ABSENCE OF OTHER ORGANS: Chronic | ICD-10-CM

## 2021-06-25 DIAGNOSIS — N84.0 POLYP OF CORPUS UTERI: ICD-10-CM

## 2021-06-25 PROCEDURE — G0463: CPT

## 2021-06-25 RX ORDER — LIDOCAINE HCL 20 MG/ML
0.2 VIAL (ML) INJECTION ONCE
Refills: 0 | Status: DISCONTINUED | OUTPATIENT
Start: 2021-07-09 | End: 2021-07-23

## 2021-06-25 RX ORDER — SODIUM CHLORIDE 9 MG/ML
3 INJECTION INTRAMUSCULAR; INTRAVENOUS; SUBCUTANEOUS EVERY 8 HOURS
Refills: 0 | Status: DISCONTINUED | OUTPATIENT
Start: 2021-07-09 | End: 2021-07-23

## 2021-06-25 RX ORDER — TRIAMCINOLONE ACETONIDE 55 MCG
1 AEROSOL, SPRAY (GRAM) NASAL
Qty: 0 | Refills: 0 | DISCHARGE

## 2021-06-25 RX ORDER — AMLODIPINE BESYLATE 2.5 MG/1
1 TABLET ORAL
Qty: 0 | Refills: 0 | DISCHARGE

## 2021-06-25 RX ORDER — LEVOTHYROXINE SODIUM 125 MCG
1 TABLET ORAL
Qty: 0 | Refills: 0 | DISCHARGE

## 2021-06-25 RX ORDER — FLUOXETINE HCL 10 MG
1 CAPSULE ORAL
Qty: 0 | Refills: 0 | DISCHARGE

## 2021-06-25 RX ORDER — LOSARTAN POTASSIUM 100 MG/1
1 TABLET, FILM COATED ORAL
Qty: 0 | Refills: 0 | DISCHARGE

## 2021-06-25 RX ORDER — ASPIRIN/CALCIUM CARB/MAGNESIUM 324 MG
1 TABLET ORAL
Qty: 0 | Refills: 0 | DISCHARGE

## 2021-06-25 RX ORDER — POTASSIUM CHLORIDE 20 MEQ
1 PACKET (EA) ORAL
Qty: 0 | Refills: 0 | DISCHARGE

## 2021-06-25 NOTE — H&P PST ADULT - HISTORY OF PRESENT ILLNESS
55 y/o F PMH morbid obesity, S/P vertical sleeve gastrectomy 2018, diabetes, states "I think my last A1c was 9 or 10", mild AQUILES, does not wear CPAP, endometrial hyperplasia.  Presents today for D+C, operative hysteroscopy, removal of endometrial polyp. 53 y/o F PMH morbid obesity, S/P vertical sleeve gastrectomy 2018, diabetes (last A1c= 7.6 on 6/12/21), hypertension, hyperlipidemia, mild AQUILES, does not wear CPAP, mild asthma (controlled), mild depression (controlled), and endometrial hyperplasia.  Presents today for D+C, operative hysteroscopy, removal of endometrial polyp scheduled for 7/9/21 with Dr. Annie Street. Patient denies fever, chills, malaise, fatigue, n/v, diarrhea, vomiting, nasal congestion, rhinnorhea, change in taste/smell, cough, SOB, chest pain, palpitations, headaches, and sore throat.  She denies any GYN symptoms, no pain, no bleeding.     COVID Vaccine 2/24/21 and 3/17/21 (Pf[employer])- on chart

## 2021-06-25 NOTE — H&P PST ADULT - NSICDXPROBLEM_GEN_ALL_CORE_FT
PROBLEM DIAGNOSES  Problem: Polyp of corpus uteri  Assessment and Plan: -Scheduled for D&C; operative hysteroscopy; and removal of endometrial polyp on 7/9/21 with Dr. Annie Street  -Labs Completed on 6/12/21 (CBC, CMP, thyroid, chol, EvgC4P=7.6)-on file  -COVID Vaccine 2/24/21 and 3/17/21 (Pfizer)- on chart  -Preop instructions provided and patient stated understanding.  -She is to check her FS am DOS  -FS ordered upon arrival to [facility]  -She was instructed to take crestor, synthroid, losartan/hcl, amlodipine, and fluoxetine am DOS with sip of water.  -She may continue her Aspirin 81 mgs, unless instructed otherwise by her surgeon  -She was advised to stop Vitamin D 1 week before surgery  -Diabetic Medications: Stop metformin 24 hours prior to surgery  & Glimperide take only am dose day before surgery and none day of surgery  -She was provided ample time to ask questions and have them answered.

## 2021-06-25 NOTE — H&P PST ADULT - NSICDXPASTSURGICALHX_GEN_ALL_CORE_FT
PAST SURGICAL HISTORY:  History of ectopic pregnancy     S/P laparoscopic sleeve gastrectomy 2018    S/P tonsillectomy

## 2021-06-25 NOTE — H&P PST ADULT - NSICDXPASTMEDICALHX_GEN_ALL_CORE_FT
PAST MEDICAL HISTORY:  Asthma well controlled    Depression     Diabetes     HTN (hypertension)     Hyperlipidemia     Hypokalemia     Hypothyroid     Morbidly obese     AQUILES (obstructive sleep apnea)

## 2021-06-25 NOTE — H&P PST ADULT - HEALTH CARE MAINTENANCE
Sees PMD routinely for medical management, received flu vaccine Sees PMD routinely for medical management, received flu vaccine  COVID Vaccine 2/24/21 and 3/17/21 (Pf[employer])- on chart

## 2021-06-27 ENCOUNTER — RX RENEWAL (OUTPATIENT)
Age: 56
End: 2021-06-27

## 2021-06-28 NOTE — PHYSICAL EXAM
[Alert] : alert [Well Nourished] : well nourished [No Acute Distress] : no acute distress [Well Developed] : well developed [Normal Sclera/Conjunctiva] : normal sclera/conjunctiva [EOMI] : extra ocular movement intact [No Proptosis] : no proptosis [Normal Oropharynx] : the oropharynx was normal [Thyroid Not Enlarged] : the thyroid was not enlarged [No Thyroid Nodules] : no palpable thyroid nodules [No Respiratory Distress] : no respiratory distress [No Accessory Muscle Use] : no accessory muscle use [Clear to Auscultation] : lungs were clear to auscultation bilaterally [Normal S1, S2] : normal S1 and S2 [Normal Rate] : heart rate was normal [Regular Rhythm] : with a regular rhythm [No Edema] : no peripheral edema [Pedal Pulses Normal] : the pedal pulses are present [Normal Anterior Cervical Nodes] : no anterior cervical lymphadenopathy [Normal Posterior Cervical Nodes] : no posterior cervical lymphadenopathy [No Spinal Tenderness] : no spinal tenderness [Spine Straight] : spine straight [No Stigmata of Cushings Syndrome] : no stigmata of Cushings Syndrome [Normal Strength/Tone] : muscle strength and tone were normal [Normal Gait] : normal gait [No Rash] : no rash [Acanthosis Nigricans] : no acanthosis nigricans [Normal] : normal [Full ROM] : with full range of motion [Diminished Throughout Both Feet] : normal tactile sensation with monofilament testing throughout both feet [Normal Reflexes] : deep tendon reflexes were 2+ and symmetric [No Tremors] : no tremors [Oriented x3] : oriented to person, place, and time

## 2021-06-28 NOTE — HISTORY OF PRESENT ILLNESS
[FreeTextEntry1] : \par \par HPI\par \par Follow up for \par \par T2DM on  amaryl 2 mg bid\par  MFN 1000 mg bid \par \par \par Hypothyroidism on LT4 0.05 mg qd \par Low Vit D on 50 K per week \par \par \par Glucose ranges:\par not testing as much ately \par  \par \par Hypoglycemia :\par 1x when did not eat ontime \par Follow ups:\par \par Opthalmology  UTD  asw opthalmology Nov 2020 \par Podiatry  \par \par \par to have polpy removal\par \par Dx with anticadiolipin antibody  by neuro   and low ANC_ to see  heamtology \par \par Renal \par \par ROS No Neck pain No voice changes  No Chest pain  No Pressure  No palpitations No dyspnea   No n/v abd pain diarrhea or constipation  rare LE edema \par \par Labs Reviewed \par Feb 2021\par \par Imaging reviewed\par \par \par

## 2021-06-28 NOTE — ASSESSMENT
[FreeTextEntry1] : T2Dm  m\par much improved contreol with some PM Low BS\par rcont amaryl 2mg bid \par  cont  MFN  1000  mg bid \par \par \par low ANC  and now anticardiolipin Ab + see heamtology \par \par \par rare CP- to see caridology  in 2 weeks for Echo etc \par \par asthma  stabel \par  Hypothrydoi Cont RX  0.05 MG SYnthoroid \par stable MNG \par check sono f feb 2022\par \par Low B12 cont RX \par \par Low Vit D cont RX  50K per week \par \par low potassium cont Kdur 8 meq \par on Losartan HCT now \par \par \par  opjhthal - stable \par \par chol-cont rosuvastatin

## 2021-07-08 ENCOUNTER — TRANSCRIPTION ENCOUNTER (OUTPATIENT)
Age: 56
End: 2021-07-08

## 2021-07-09 ENCOUNTER — RESULT REVIEW (OUTPATIENT)
Age: 56
End: 2021-07-09

## 2021-07-09 ENCOUNTER — OUTPATIENT (OUTPATIENT)
Dept: OUTPATIENT SERVICES | Facility: HOSPITAL | Age: 56
LOS: 1 days | End: 2021-07-09
Payer: COMMERCIAL

## 2021-07-09 ENCOUNTER — APPOINTMENT (OUTPATIENT)
Dept: OBGYN | Facility: CLINIC | Age: 56
End: 2021-07-09
Payer: COMMERCIAL

## 2021-07-09 VITALS
TEMPERATURE: 98 F | WEIGHT: 248.02 LBS | SYSTOLIC BLOOD PRESSURE: 151 MMHG | RESPIRATION RATE: 16 BRPM | HEART RATE: 76 BPM | DIASTOLIC BLOOD PRESSURE: 71 MMHG | OXYGEN SATURATION: 98 % | HEIGHT: 64 IN

## 2021-07-09 VITALS
DIASTOLIC BLOOD PRESSURE: 71 MMHG | RESPIRATION RATE: 17 BRPM | HEART RATE: 72 BPM | OXYGEN SATURATION: 97 % | TEMPERATURE: 97 F | SYSTOLIC BLOOD PRESSURE: 158 MMHG

## 2021-07-09 DIAGNOSIS — N84.0 POLYP OF CORPUS UTERI: ICD-10-CM

## 2021-07-09 DIAGNOSIS — Z87.59 PERSONAL HISTORY OF OTHER COMPLICATIONS OF PREGNANCY, CHILDBIRTH AND THE PUERPERIUM: Chronic | ICD-10-CM

## 2021-07-09 DIAGNOSIS — Z90.89 ACQUIRED ABSENCE OF OTHER ORGANS: Chronic | ICD-10-CM

## 2021-07-09 DIAGNOSIS — Z98.84 BARIATRIC SURGERY STATUS: Chronic | ICD-10-CM

## 2021-07-09 LAB — GLUCOSE BLDC GLUCOMTR-MCNC: 174 MG/DL — HIGH (ref 70–99)

## 2021-07-09 PROCEDURE — 58558 HYSTEROSCOPY BIOPSY: CPT

## 2021-07-09 PROCEDURE — C9399: CPT

## 2021-07-09 PROCEDURE — 58555 HYSTEROSCOPY DX SEP PROC: CPT

## 2021-07-09 PROCEDURE — 88305 TISSUE EXAM BY PATHOLOGIST: CPT

## 2021-07-09 PROCEDURE — 58120 DILATION AND CURETTAGE: CPT

## 2021-07-09 PROCEDURE — 88305 TISSUE EXAM BY PATHOLOGIST: CPT | Mod: 26

## 2021-07-09 PROCEDURE — 82962 GLUCOSE BLOOD TEST: CPT

## 2021-07-09 RX ORDER — HYDROMORPHONE HYDROCHLORIDE 2 MG/ML
0.25 INJECTION INTRAMUSCULAR; INTRAVENOUS; SUBCUTANEOUS
Refills: 0 | Status: DISCONTINUED | OUTPATIENT
Start: 2021-07-09 | End: 2021-07-09

## 2021-07-09 RX ORDER — ASPIRIN/CALCIUM CARB/MAGNESIUM 324 MG
1 TABLET ORAL
Qty: 0 | Refills: 0 | DISCHARGE

## 2021-07-09 RX ORDER — POTASSIUM CHLORIDE 20 MEQ
1 PACKET (EA) ORAL
Qty: 0 | Refills: 0 | DISCHARGE

## 2021-07-09 RX ORDER — LOSARTAN/HYDROCHLOROTHIAZIDE 100MG-25MG
1 TABLET ORAL
Qty: 0 | Refills: 0 | DISCHARGE

## 2021-07-09 RX ORDER — GLIMEPIRIDE 1 MG
1 TABLET ORAL
Qty: 0 | Refills: 0 | DISCHARGE

## 2021-07-09 RX ORDER — LEVOTHYROXINE SODIUM 125 MCG
1 TABLET ORAL
Qty: 0 | Refills: 0 | DISCHARGE

## 2021-07-09 RX ORDER — ONDANSETRON 8 MG/1
4 TABLET, FILM COATED ORAL ONCE
Refills: 0 | Status: DISCONTINUED | OUTPATIENT
Start: 2021-07-09 | End: 2021-07-23

## 2021-07-09 RX ORDER — CHOLECALCIFEROL (VITAMIN D3) 125 MCG
1 CAPSULE ORAL
Qty: 0 | Refills: 0 | DISCHARGE

## 2021-07-09 RX ORDER — AMLODIPINE BESYLATE 2.5 MG/1
1 TABLET ORAL
Qty: 0 | Refills: 0 | DISCHARGE

## 2021-07-09 RX ORDER — ROSUVASTATIN CALCIUM 5 MG/1
1 TABLET ORAL
Qty: 0 | Refills: 0 | DISCHARGE

## 2021-07-09 RX ORDER — FLUOXETINE HCL 10 MG
1 CAPSULE ORAL
Qty: 0 | Refills: 0 | DISCHARGE

## 2021-07-09 RX ORDER — SODIUM CHLORIDE 9 MG/ML
1000 INJECTION, SOLUTION INTRAVENOUS
Refills: 0 | Status: DISCONTINUED | OUTPATIENT
Start: 2021-07-09 | End: 2021-07-23

## 2021-07-09 RX ORDER — MONTELUKAST 4 MG/1
1 TABLET, CHEWABLE ORAL
Qty: 0 | Refills: 0 | DISCHARGE

## 2021-07-09 RX ORDER — METFORMIN HYDROCHLORIDE 850 MG/1
1 TABLET ORAL
Qty: 0 | Refills: 0 | DISCHARGE

## 2021-07-09 RX ORDER — OXYCODONE HYDROCHLORIDE 5 MG/1
5 TABLET ORAL ONCE
Refills: 0 | Status: DISCONTINUED | OUTPATIENT
Start: 2021-07-09 | End: 2021-07-09

## 2021-07-09 RX ORDER — ASCORBIC ACID 60 MG
1 TABLET,CHEWABLE ORAL
Qty: 0 | Refills: 0 | DISCHARGE

## 2021-07-09 NOTE — BRIEF OPERATIVE NOTE - NSICDXBRIEFPROCEDURE_GEN_ALL_CORE_FT
PROCEDURES:  Diagnostic hysteroscopy 09-Jul-2021 14:02:56  Gayatri Ingram  D&C (dilatation and curettage, scraping of uterus) 09-Jul-2021 14:03:18  Gayatri Ingram

## 2021-07-09 NOTE — ASU DISCHARGE PLAN (ADULT/PEDIATRIC) - CARE PROVIDER_API CALL
Annie Street)  Obstetrics and Gynecology  [address] [id number]  Phone: [phone]  Fax: [phone]  Follow Up Time: 2 weeks

## 2021-07-09 NOTE — BRIEF OPERATIVE NOTE - OPERATION/FINDINGS
EUA: anteverted 12 week uterus. No adnexal masses appreciated  Hysteroscopy: A false passage was made into the myometrium but did not perforate through the uterus. No bleeding from false site. Uterus was atrophic and both ostia were visualized. No polyps seen.

## 2021-07-09 NOTE — ASU PATIENT PROFILE, ADULT - PMH
Asthma  well controlled  Depression    Diabetes    HTN (hypertension)    Hyperlipidemia    Hypokalemia    Hypothyroid    Morbidly obese    AQUILES (obstructive sleep apnea)

## 2021-07-11 ENCOUNTER — INPATIENT (INPATIENT)
Facility: HOSPITAL | Age: 56
LOS: 2 days | Discharge: HOME | DRG: 872 | End: 2021-07-14
Attending: INTERNAL MEDICINE | Admitting: STUDENT IN AN ORGANIZED HEALTH CARE EDUCATION/TRAINING PROGRAM
Payer: COMMERCIAL

## 2021-07-11 VITALS
HEART RATE: 115 BPM | WEIGHT: 248.02 LBS | SYSTOLIC BLOOD PRESSURE: 151 MMHG | DIASTOLIC BLOOD PRESSURE: 79 MMHG | TEMPERATURE: 103 F | RESPIRATION RATE: 18 BRPM | HEIGHT: 64 IN | OXYGEN SATURATION: 96 %

## 2021-07-11 DIAGNOSIS — Z87.59 PERSONAL HISTORY OF OTHER COMPLICATIONS OF PREGNANCY, CHILDBIRTH AND THE PUERPERIUM: Chronic | ICD-10-CM

## 2021-07-11 DIAGNOSIS — Z90.89 ACQUIRED ABSENCE OF OTHER ORGANS: Chronic | ICD-10-CM

## 2021-07-11 DIAGNOSIS — R59.1 GENERALIZED ENLARGED LYMPH NODES: ICD-10-CM

## 2021-07-11 DIAGNOSIS — Z98.84 BARIATRIC SURGERY STATUS: Chronic | ICD-10-CM

## 2021-07-11 LAB
ALBUMIN SERPL ELPH-MCNC: 4.2 G/DL — SIGNIFICANT CHANGE UP (ref 3.3–5)
ALP SERPL-CCNC: 51 U/L — SIGNIFICANT CHANGE UP (ref 40–120)
ALT FLD-CCNC: 23 U/L — SIGNIFICANT CHANGE UP (ref 10–45)
ANION GAP SERPL CALC-SCNC: 16 MMOL/L — SIGNIFICANT CHANGE UP (ref 5–17)
APPEARANCE UR: CLEAR — SIGNIFICANT CHANGE UP
AST SERPL-CCNC: 28 U/L — SIGNIFICANT CHANGE UP (ref 10–40)
BACTERIA # UR AUTO: NEGATIVE — SIGNIFICANT CHANGE UP
BASE EXCESS BLDV CALC-SCNC: 6.3 MMOL/L — HIGH (ref -2–2)
BASOPHILS # BLD AUTO: 0.02 K/UL — SIGNIFICANT CHANGE UP (ref 0–0.2)
BASOPHILS NFR BLD AUTO: 0.3 % — SIGNIFICANT CHANGE UP (ref 0–2)
BILIRUB SERPL-MCNC: 1 MG/DL — SIGNIFICANT CHANGE UP (ref 0.2–1.2)
BILIRUB UR-MCNC: NEGATIVE — SIGNIFICANT CHANGE UP
BUN SERPL-MCNC: 14 MG/DL — SIGNIFICANT CHANGE UP (ref 7–23)
CA-I SERPL-SCNC: 1.07 MMOL/L — LOW (ref 1.12–1.3)
CALCIUM SERPL-MCNC: 9.1 MG/DL — SIGNIFICANT CHANGE UP (ref 8.4–10.5)
CHLORIDE BLDV-SCNC: 96 MMOL/L — SIGNIFICANT CHANGE UP (ref 96–108)
CHLORIDE SERPL-SCNC: 92 MMOL/L — LOW (ref 96–108)
CO2 BLDV-SCNC: 31 MMOL/L — HIGH (ref 22–30)
CO2 SERPL-SCNC: 24 MMOL/L — SIGNIFICANT CHANGE UP (ref 22–31)
COLOR SPEC: YELLOW — SIGNIFICANT CHANGE UP
CREAT SERPL-MCNC: 0.72 MG/DL — SIGNIFICANT CHANGE UP (ref 0.5–1.3)
DIFF PNL FLD: ABNORMAL
EOSINOPHIL # BLD AUTO: 0.18 K/UL — SIGNIFICANT CHANGE UP (ref 0–0.5)
EOSINOPHIL NFR BLD AUTO: 2.7 % — SIGNIFICANT CHANGE UP (ref 0–6)
EPI CELLS # UR: 1 /HPF — SIGNIFICANT CHANGE UP
GAS PNL BLDV: 131 MMOL/L — LOW (ref 135–145)
GAS PNL BLDV: SIGNIFICANT CHANGE UP
GAS PNL BLDV: SIGNIFICANT CHANGE UP
GLUCOSE BLDC GLUCOMTR-MCNC: 215 MG/DL — HIGH (ref 70–99)
GLUCOSE BLDV-MCNC: 293 MG/DL — HIGH (ref 70–99)
GLUCOSE SERPL-MCNC: 295 MG/DL — HIGH (ref 70–99)
GLUCOSE UR QL: ABNORMAL
HCO3 BLDV-SCNC: 30 MMOL/L — HIGH (ref 21–29)
HCT VFR BLD CALC: 40.3 % — SIGNIFICANT CHANGE UP (ref 34.5–45)
HCT VFR BLDA CALC: 44 % — SIGNIFICANT CHANGE UP (ref 39–50)
HGB BLD CALC-MCNC: 14.4 G/DL — SIGNIFICANT CHANGE UP (ref 11.5–15.5)
HGB BLD-MCNC: 14 G/DL — SIGNIFICANT CHANGE UP (ref 11.5–15.5)
HYALINE CASTS # UR AUTO: 0 /LPF — SIGNIFICANT CHANGE UP (ref 0–2)
IMM GRANULOCYTES NFR BLD AUTO: 0.7 % — SIGNIFICANT CHANGE UP (ref 0–1.5)
KETONES UR-MCNC: NEGATIVE — SIGNIFICANT CHANGE UP
LACTATE BLDV-MCNC: 1.8 MMOL/L — SIGNIFICANT CHANGE UP (ref 0.7–2)
LEUKOCYTE ESTERASE UR-ACNC: NEGATIVE — SIGNIFICANT CHANGE UP
LYMPHOCYTES # BLD AUTO: 0.89 K/UL — LOW (ref 1–3.3)
LYMPHOCYTES # BLD AUTO: 13.3 % — SIGNIFICANT CHANGE UP (ref 13–44)
MCHC RBC-ENTMCNC: 30.6 PG — SIGNIFICANT CHANGE UP (ref 27–34)
MCHC RBC-ENTMCNC: 34.7 GM/DL — SIGNIFICANT CHANGE UP (ref 32–36)
MCV RBC AUTO: 88 FL — SIGNIFICANT CHANGE UP (ref 80–100)
MONOCYTES # BLD AUTO: 0.57 K/UL — SIGNIFICANT CHANGE UP (ref 0–0.9)
MONOCYTES NFR BLD AUTO: 8.5 % — SIGNIFICANT CHANGE UP (ref 2–14)
NEUTROPHILS # BLD AUTO: 4.99 K/UL — SIGNIFICANT CHANGE UP (ref 1.8–7.4)
NEUTROPHILS NFR BLD AUTO: 74.5 % — SIGNIFICANT CHANGE UP (ref 43–77)
NITRITE UR-MCNC: NEGATIVE — SIGNIFICANT CHANGE UP
NRBC # BLD: 0 /100 WBCS — SIGNIFICANT CHANGE UP (ref 0–0)
OTHER CELLS CSF MANUAL: 14 ML/DL — LOW (ref 18–22)
PCO2 BLDV: 39 MMHG — SIGNIFICANT CHANGE UP (ref 35–50)
PH BLDV: 7.49 — HIGH (ref 7.35–7.45)
PH UR: 6.5 — SIGNIFICANT CHANGE UP (ref 5–8)
PLATELET # BLD AUTO: 143 K/UL — LOW (ref 150–400)
PO2 BLDV: 38 MMHG — SIGNIFICANT CHANGE UP (ref 25–45)
POTASSIUM BLDV-SCNC: 3.1 MMOL/L — LOW (ref 3.5–5.3)
POTASSIUM SERPL-MCNC: 3.4 MMOL/L — LOW (ref 3.5–5.3)
POTASSIUM SERPL-SCNC: 3.4 MMOL/L — LOW (ref 3.5–5.3)
PROT SERPL-MCNC: 7.8 G/DL — SIGNIFICANT CHANGE UP (ref 6–8.3)
PROT UR-MCNC: ABNORMAL
RAPID RVP RESULT: SIGNIFICANT CHANGE UP
RBC # BLD: 4.58 M/UL — SIGNIFICANT CHANGE UP (ref 3.8–5.2)
RBC # FLD: 12.9 % — SIGNIFICANT CHANGE UP (ref 10.3–14.5)
RBC CASTS # UR COMP ASSIST: 8 /HPF — HIGH (ref 0–4)
SAO2 % BLDV: 73 % — SIGNIFICANT CHANGE UP (ref 67–88)
SARS-COV-2 RNA SPEC QL NAA+PROBE: SIGNIFICANT CHANGE UP
SARS-COV-2 RNA SPEC QL NAA+PROBE: SIGNIFICANT CHANGE UP
SODIUM SERPL-SCNC: 132 MMOL/L — LOW (ref 135–145)
SP GR SPEC: 1.02 — SIGNIFICANT CHANGE UP (ref 1.01–1.02)
UROBILINOGEN FLD QL: NEGATIVE — SIGNIFICANT CHANGE UP
WBC # BLD: 6.7 K/UL — SIGNIFICANT CHANGE UP (ref 3.8–10.5)
WBC # FLD AUTO: 6.7 K/UL — SIGNIFICANT CHANGE UP (ref 3.8–10.5)
WBC UR QL: 1 /HPF — SIGNIFICANT CHANGE UP (ref 0–5)

## 2021-07-11 PROCEDURE — 76830 TRANSVAGINAL US NON-OB: CPT | Mod: 26

## 2021-07-11 PROCEDURE — 99283 EMERGENCY DEPT VISIT LOW MDM: CPT | Mod: 24

## 2021-07-11 PROCEDURE — 74177 CT ABD & PELVIS W/CONTRAST: CPT | Mod: 26,MA

## 2021-07-11 PROCEDURE — 99285 EMERGENCY DEPT VISIT HI MDM: CPT | Mod: 25

## 2021-07-11 PROCEDURE — 71045 X-RAY EXAM CHEST 1 VIEW: CPT | Mod: 26

## 2021-07-11 PROCEDURE — 76856 US EXAM PELVIC COMPLETE: CPT | Mod: 26

## 2021-07-11 PROCEDURE — 93010 ELECTROCARDIOGRAM REPORT: CPT | Mod: NC

## 2021-07-11 RX ORDER — DEXTROSE 50 % IN WATER 50 %
25 SYRINGE (ML) INTRAVENOUS ONCE
Refills: 0 | Status: DISCONTINUED | OUTPATIENT
Start: 2021-07-11 | End: 2021-07-14

## 2021-07-11 RX ORDER — ONDANSETRON 8 MG/1
4 TABLET, FILM COATED ORAL ONCE
Refills: 0 | Status: COMPLETED | OUTPATIENT
Start: 2021-07-11 | End: 2021-07-11

## 2021-07-11 RX ORDER — SODIUM CHLORIDE 9 MG/ML
1000 INJECTION, SOLUTION INTRAVENOUS
Refills: 0 | Status: DISCONTINUED | OUTPATIENT
Start: 2021-07-11 | End: 2021-07-14

## 2021-07-11 RX ORDER — GLUCAGON INJECTION, SOLUTION 0.5 MG/.1ML
1 INJECTION, SOLUTION SUBCUTANEOUS ONCE
Refills: 0 | Status: DISCONTINUED | OUTPATIENT
Start: 2021-07-11 | End: 2021-07-14

## 2021-07-11 RX ORDER — INSULIN LISPRO 100/ML
VIAL (ML) SUBCUTANEOUS AT BEDTIME
Refills: 0 | Status: DISCONTINUED | OUTPATIENT
Start: 2021-07-11 | End: 2021-07-14

## 2021-07-11 RX ORDER — INSULIN LISPRO 100/ML
VIAL (ML) SUBCUTANEOUS
Refills: 0 | Status: DISCONTINUED | OUTPATIENT
Start: 2021-07-11 | End: 2021-07-14

## 2021-07-11 RX ORDER — DEXTROSE 50 % IN WATER 50 %
12.5 SYRINGE (ML) INTRAVENOUS ONCE
Refills: 0 | Status: DISCONTINUED | OUTPATIENT
Start: 2021-07-11 | End: 2021-07-14

## 2021-07-11 RX ORDER — DEXTROSE 50 % IN WATER 50 %
15 SYRINGE (ML) INTRAVENOUS ONCE
Refills: 0 | Status: DISCONTINUED | OUTPATIENT
Start: 2021-07-11 | End: 2021-07-14

## 2021-07-11 RX ORDER — ACETAMINOPHEN 500 MG
975 TABLET ORAL ONCE
Refills: 0 | Status: COMPLETED | OUTPATIENT
Start: 2021-07-11 | End: 2021-07-11

## 2021-07-11 RX ORDER — METRONIDAZOLE 500 MG
500 TABLET ORAL EVERY 8 HOURS
Refills: 0 | Status: DISCONTINUED | OUTPATIENT
Start: 2021-07-11 | End: 2021-07-13

## 2021-07-11 RX ORDER — PIPERACILLIN AND TAZOBACTAM 4; .5 G/20ML; G/20ML
3.38 INJECTION, POWDER, LYOPHILIZED, FOR SOLUTION INTRAVENOUS ONCE
Refills: 0 | Status: COMPLETED | OUTPATIENT
Start: 2021-07-11 | End: 2021-07-11

## 2021-07-11 RX ORDER — CEFTRIAXONE 500 MG/1
1000 INJECTION, POWDER, FOR SOLUTION INTRAMUSCULAR; INTRAVENOUS ONCE
Refills: 0 | Status: COMPLETED | OUTPATIENT
Start: 2021-07-11 | End: 2021-07-11

## 2021-07-11 RX ORDER — SODIUM CHLORIDE 9 MG/ML
1000 INJECTION INTRAMUSCULAR; INTRAVENOUS; SUBCUTANEOUS ONCE
Refills: 0 | Status: COMPLETED | OUTPATIENT
Start: 2021-07-11 | End: 2021-07-11

## 2021-07-11 RX ADMIN — ONDANSETRON 4 MILLIGRAM(S): 8 TABLET, FILM COATED ORAL at 11:54

## 2021-07-11 RX ADMIN — Medication 975 MILLIGRAM(S): at 10:44

## 2021-07-11 RX ADMIN — Medication 975 MILLIGRAM(S): at 12:16

## 2021-07-11 RX ADMIN — PIPERACILLIN AND TAZOBACTAM 200 GRAM(S): 4; .5 INJECTION, POWDER, LYOPHILIZED, FOR SOLUTION INTRAVENOUS at 22:24

## 2021-07-11 RX ADMIN — SODIUM CHLORIDE 1000 MILLILITER(S): 9 INJECTION INTRAMUSCULAR; INTRAVENOUS; SUBCUTANEOUS at 12:16

## 2021-07-11 RX ADMIN — SODIUM CHLORIDE 1000 MILLILITER(S): 9 INJECTION INTRAMUSCULAR; INTRAVENOUS; SUBCUTANEOUS at 11:01

## 2021-07-11 RX ADMIN — CEFTRIAXONE 100 MILLIGRAM(S): 500 INJECTION, POWDER, FOR SOLUTION INTRAMUSCULAR; INTRAVENOUS at 13:35

## 2021-07-11 RX ADMIN — Medication 975 MILLIGRAM(S): at 22:38

## 2021-07-11 RX ADMIN — Medication 975 MILLIGRAM(S): at 17:26

## 2021-07-11 NOTE — ED ADULT NURSE NOTE - OBJECTIVE STATEMENT
pt 54 yo female approx 48 hrs post op D&C by gyn developed fever 103 last night pt alert oriented took no fever meds today ambulatory with steady gait presents with fever 102 pt 54 yo female approx 48 hrs post op D&C by gyn developed fever 103 last night pt alert oriented took no fever meds today ambulatory with steady gait presents with fever 102 pt alert oriented pt also c/o headache some nausea onset yesterday pt denies vomiting denies dysuria no vaginal bleeding some photophobia pt ambulatory with steady gait

## 2021-07-11 NOTE — CONSULT NOTE ADULT - ASSESSMENT
54 y/o with mutliple medical comorbidites presenting on POD#2 with fever.   - CT AP  - UA, Urine Cx, Blood cx  - CTX for presumed UTI  - Urine GC/CT     D/w Dr. Street 56 y/o with mutliple medical comorbidites presenting on POD#2 with fever.   - CT AP  - UA, Urine Cx, Blood cx, CXR  - CTX for presumed UTI  - Urine GC/CT     D/w Dr. Street    Agree with above:    Pt status post D& C hysteroscopy, not acute abdomen, no CMT , no uterine of adnexal tenderness and no CVAT.  Lungs CTA,     WBC 6.7 no left shift    CT Abd/Pelvis : splenomegaly and right pericardia structure? enlarged lymph node 5.1x3.3, splenomegaly 17.7, no free air no ff or bowel perforation, Focal thickening of cecum and proximal ascending colon and mild pericolic infammatory changes, left adnexae prominent with collateral vessels.      Doubt GYN origin of fever.    1. Will continue IV ceftriazone for broad spectrum coverage  2. Ucx, blood cxs, URI panel  3. TVS   4. will admit and observe closely.    Annie Street M.D.   56 y/o with multiple medical comorbidities presenting on POD#2 s/p D&C and diagnostic hysteroscopy with fever of 103 at home, here febrile to 39.2 on arrival. D&C/Hysteroscopy notable for creation of false passage, without uterine perforation. On exam, no CMT, uterine tenderness suggesting GYN etiology.   - CT AP  - UA, Urine Cx, Blood cx, CXR  - CTX for possible UTI  - Urine GC/CT     D/w Dr. Jad James, PGY-2  Agree with above:    Pt status post D& C hysteroscopy, not acute abdomen, no CMT , no uterine of adnexal tenderness and no CVAT.  Lungs CTA,     WBC 6.7 no left shift    CT Abd/Pelvis : splenomegaly and right pericardia structure? enlarged lymph node 5.1x3.3, splenomegaly 17.7, no free air no ff or bowel perforation, Focal thickening of cecum and proximal ascending colon and mild pericolic infammatory changes, left adnexae prominent with collateral vessels.      Doubt GYN origin of fever.    1. Will continue IV ceftriazone for broad spectrum coverage  2. Ucx, blood cxs, URI panel  3. TVS   4. will admit and observe closely.    Annie Street M.D.

## 2021-07-11 NOTE — ED PROVIDER NOTE - SHIFT CHANGE DETAILS
Yuval Vaca MD FACEP note of transfer at the usual time of sign out: Receiving team will follow up on labs, analgesia, any clinical imaging, reassess and disposition as clinically indicated.  Details of patient and plan conveyed to receiving physician and conveyed back for understanding.  There were no questions at this time about the patient's status, disposition, and plan. Patient's care to be taken over by receiving physician at this time, all decisions regarding the progression of care will be made at their discretion.

## 2021-07-11 NOTE — ED PROVIDER NOTE - PROGRESS NOTE DETAILS
Kristal Perdomo PGY-1: OB paged. Kristal Perdomo PGY-2: OB to see. Kristal Perdomo PGY-2: OB paged. Kristal Perdomo PGY-2: CT results "Mild wall thickening of the cecum and proximal ascending colon with pericolic inflammatory change, suggesting colitis. Differential includes infectious, inflammatory and ischemic etiology. Given focal nature, if not recently performed, recommend colonoscopy after resolution of symptomatology to exclude underlying lesion.    Left adnexa is prominent and inseparable from multiple collateral vessels. Consider more definitive characterization with pelvic ultrasound.    Splenomegaly and extensive lymphadenopathy of uncertain etiology. Differential includes inflammatory and neoplastic etiologies. Correlate clinically.    A partially imaged fat density right pericardial structure is indeterminate. Consider CT chest for further characterization." Kristal Perdomo PGY-2: OB attending to see, TVUS ordered. Kristal Perdomo PGY-2: Patient febrile again, to give Tylenol. As per GYN, fever unlikely from a gynecological origin, but will follow. Will admit for malignancy work up. Received call from gyn attending requesting specifically zosyn and flagyl

## 2021-07-11 NOTE — ED PROVIDER NOTE - OBJECTIVE STATEMENT
s/p D&C two days ago with fever beginning yesterday. Febrile to Tmax 103 at home. Also endorsing mild abdominal cramping, no vaginal discharge or bleeding. Denies N/V/D, CP, SOB 55 year old F PMH s/p D&C two days ago with fever beginning yesterday. Febrile to Tmax 103 at home. Also endorsing mild abdominal cramping, no vaginal discharge or bleeding. Denies N/V/D, CP, SOB 55 year old F PMH morbid obesity, S/P vertical sleeve gastrectomy 2018, diabetes (last A1c= 7.6 on 6/12/21), hypertension, hyperlipidemia, mild AQUILES, does not wear CPAP, mild asthma (controlled), mild depression (controlled), and endometrial hyperplasia s/p D&C w/ polyp removal by OB two days ago p/w with fever beginning yesterday. Febrile to Tmax 103 at home. Also endorsing mild abdominal cramping, no vaginal discharge or bleeding. Denies N/V/D, CP, SOB, cough, sick contact.

## 2021-07-11 NOTE — ED PROVIDER NOTE - CLINICAL SUMMARY MEDICAL DECISION MAKING FREE TEXT BOX
55 year old F with extensive PMH s/p D&C of uterine polyp two days ago p/w fever x1 day. Febrile Tmax 103 at home. 102 here. Non toxic appearing. Non tachycardic, normotensive. Mild suprapubic abdominal pain on exam. Concern for post op infection (endometritis?). Will get labs including infectious workup, fluids, tylenol for fever, consult OB.

## 2021-07-11 NOTE — PATIENT PROFILE ADULT - DATE OF LAST VACCINATION
RETURN TO WORK/SCHOOL FORM    6/12/2017    Re: Tg Calero  1987      To Whom It May Concern:     Tg Calero was seen in clinic today. She may return to work without restrictions on 6/13/17.          Restrictions:  None.      Oral Jalloh MD  6/12/2017 4:03 PM    17-Mar-2021

## 2021-07-11 NOTE — ED ADULT NURSE REASSESSMENT NOTE - NS ED NURSE REASSESS COMMENT FT1
pt with fever returning md notified tylenol given pt moved to room for dctor to speak with patient reguarding her ct results and their suspect of some cancerous  processs sister remains at bedside pt pending disposition

## 2021-07-11 NOTE — ED PROVIDER NOTE - NS ED ROS FT
CONST: + fevers, no chills  EYES: no pain, no vision changes  ENT: no sore throat, no ear pain, no change in hearing  CV: no chest pain, no leg swelling  RESP: no shortness of breath, no cough  ABD: +abdominal cramping, no nausea, no vomiting, no diarrhea  : no dysuria, no flank pain, no hematuria, no vaginal discharge or bleeding  MSK: no back pain, no extremity pain  NEURO: no headache or additional neurologic complaints  HEME: no easy bleeding  SKIN:  no rash

## 2021-07-11 NOTE — ED ADULT NURSE NOTE - NSFALLRSKASSESSDT_ED_ALL_ED
11-Jul-2021 10:20 Scc Moderately Differentiated Histology Text: Full thickness keratinocyte atypia is observed microscopically. Focal dyskeratosis and apoptosis of keratinocytes is observed. The epidermis is acanthotic with growth of atypical keratinocytes beyond the level of the sebaceous glands, many groups of keratinocytes display abnormal or absent keratin formation.
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
## 2021-07-11 NOTE — ED PROVIDER NOTE - ATTENDING CONTRIBUTION TO CARE
Yuval Vaca MD, FACEP: In this physician's medical judgement based on clinical history and physical exam, patient with suprapubic abdominal discomfort and fever status post uterine polypectomy three days prior. patient was intubated for the procedure, mild myalgias and neck discomfort following intubation. patient reports she has little pain with full range of motion of the neck and no back pain.  Patient does report mild abdominal discomfort, no cough. Patient feels better with Tylenol.  ncat  supple neck, neg kerning's and Brudzinski's   perrl,  eomi  non-tachycardic  non-tachypneic   lungs clear to auscultation bilaterally, equal breath sounds bilaterally  elevated bmi  soft, mild suprapubic tenderness to palpation, no rebound/guarding   no gross deformity of extremities, no asymmetry  no rash/vesicles/petechiae  with capacity and insight   no gross deformity of extremities, no asymmetry   concern for post procedure fever  atelectasis, viral illness, uti, or post operative complication  will get iv, cbc, cmp, ua, urine culture, cxr, rvp, Tylenol ibuprofen prn, fluids, vbg, ct /ap with iv contrast  Will follow up on labs, analgesia, imaging, reassess and disposition as clinically indicated.

## 2021-07-11 NOTE — CONSULT NOTE ADULT - SUBJECTIVE AND OBJECTIVE BOX
MICHAEL CRISOSTOMO  55y  Female [id number]    INCOMPLETE NOTE  HPI:        Name of GYN Physician: Dr. Street    POB: P0  Pgyn: +Fibroids, +ectopic pregnancy s/p salpingectomy  PMH: Morbid obesity, Poorly controlled DM, HTN, HLD, mild AQUILES, asthma, depression  PSH: sleeve gastrectomy, salpingectomy  Meds: Ellipta, metformin, asprin, synthroid, glimeperide, monteleukast, losartan-HCTZ, Amlodipine, Fluoxetine   All:NKDA, intolerant to amoxicillin and avelox  SH: Denies smoking use, drug use, alcohol use    Vital Signs Last 24 Hrs  T(C): 38.4 (11 Jul 2021 11:20), Max: 39.2 (11 Jul 2021 09:56)  T(F): 101.2 (11 Jul 2021 11:20), Max: 102.6 (11 Jul 2021 09:56)  HR: 92 (11 Jul 2021 11:20) (92 - 115)  BP: 123/73 (11 Jul 2021 11:20) (123/73 - 156/84)  BP(mean): --  RR: 16 (11 Jul 2021 11:20) (16 - 22)  SpO2: 93% (11 Jul 2021 11:20) (93% - 97%)    Physical Exam:   General: sitting comftorably in bed, NAD   HEENT: neck supple, full ROM  CV: RR S1S2 no m/r/g  Lungs: CTA b/l, good air flow b/l   Back: No CVA tenderness  Abd: Soft, non-tender, non-distended.  Bowel sounds present.    :  No bleeding on pad.    External labia wnl.  Bimanual exam with no cervical motion tenderness, uterus wnl, adnexa non palpable b/l.  Cervix closed vs. Cervix dilated    cm   Speculum Exam: No active bleeding from os.  Physiologic discharge.    Ext: non-tender b/l, no edema     LABS:                              14.0   6.70  )-----------( 143      ( 11 Jul 2021 10:43 )             40.3     07-11    132<L>  |  92<L>  |  14  ----------------------------<  295<H>  3.4<L>   |  24  |  0.72    Ca    9.1      11 Jul 2021 10:43    TPro  7.8  /  Alb  4.2  /  TBili  1.0  /  DBili  x   /  AST  28  /  ALT  23  /  AlkPhos  51  07-11    I&O's Detail          RADIOLOGY & ADDITIONAL STUDIES:        Amanda James  PGY-2 MICHAEL CRISOSTOMO  55y  Female [id number]    INCOMPLETE NOTE  HPI:    54 y/o postmenopausal female with PMHx of T2DM, obesity, HTN, HLD, morbid obesity, AQUILES, asthma POD#2 from D&C and diagnostic hysteroscopy presenting with fevers at home. Patient states that after the D&C on Friday she was recovering appropriately, tolerating PO, ambulating, denied any pain or vaginal bleeding. Yesterday she was went shopping in the morning, ate lunch, was otherwise feeling OK until at dinner she noted a fever of 103. She had some relief with Tylenol and cold compresses. This morning she was febrile again and presented to the ED. She reports associated nausea,  urinary frequency and urgency. No vomiting or diarrhea. Also reporting diffuse fatigue, frontal pressure-like headache and neck ache, improved with Tylenol. Denies any CP, SOB, cough, focal numbness, tingling, weakness. On arrival to the ED patient febrile to 39.2 and initially tachycardic to 110's.     Name of GYN Physician: Dr. Street    POB: P0  Pgyn: +Fibroids, +ectopic pregnancy s/p salpingectomy  PMH: Morbid obesity, Poorly controlled DM, HTN, HLD, mild AQUILES, asthma, depression  PSH: sleeve gastrectomy, salpingectomy  Meds: Ellipta, metformin, asprin, synthroid, glimeperide, monteleukast, losartan-HCTZ, Amlodipine, Fluoxetine   All:NKDA, intolerant to amoxicillin and avelox  SH: Denies smoking use, drug use, alcohol use    Vital Signs Last 24 Hrs  T(C): 38.4 (11 Jul 2021 11:20), Max: 39.2 (11 Jul 2021 09:56)  T(F): 101.2 (11 Jul 2021 11:20), Max: 102.6 (11 Jul 2021 09:56)  HR: 92 (11 Jul 2021 11:20) (92 - 115)  BP: 123/73 (11 Jul 2021 11:20) (123/73 - 156/84)  BP(mean): --  RR: 16 (11 Jul 2021 11:20) (16 - 22)  SpO2: 93% (11 Jul 2021 11:20) (93% - 97%)    Physical Exam:   General: Obese, laying comfortably in bed, NAD   CV: RRR  Lungs: CTA b/l  Back: No CVA tenderness  Abd: Soft, moderately-tender in lower abdomen, non-distended.   :  No bleeding on pad.    External labia wnl.  Bimanual exam with no cervical motion tenderness, uterus wnl, adnexa non palpable b/l.  Speculum Exam: No active bleeding from os. No discharge.   Ext: non-tender b/l, no edema     LABS:                              14.0   6.70  )-----------( 143      ( 11 Jul 2021 10:43 )             40.3     07-11    132<L>  |  92<L>  |  14  ----------------------------<  295<H>  3.4<L>   |  24  |  0.72    Ca    9.1      11 Jul 2021 10:43    TPro  7.8  /  Alb  4.2  /  TBili  1.0  /  DBili  x   /  AST  28  /  ALT  23  /  AlkPhos  51  07-11    I&O's Detail

## 2021-07-11 NOTE — CHART NOTE - NSCHARTNOTEFT_GEN_A_CORE
Spoke with pt who is wanting to schedule a mammogram with [facility] to be seen 1/15/19 due to finding a lump on her breast. I offered to elmer pt with [facility] but pt would prefer to go back to [facility] since they have done her mammograms in the past. She will contact them to schedule and at that time we can fax the orders to them.   PGY4 Re-evaluation note: Patient re-evaluated bedside s/p CTAP significant for findings of colitis and L adnexa non distinguishable from collateral vessels. Patient reports presenting to ED following high fever (103F) at home yesterday evening. She denies any associated SOB, abdominal pain, vaginal pain, foul smelling discharge, urinary symptoms. She does report mild nausea without vomiting today, improved and patient tolerating crackers. She also reports diarrhea today. Abdomen soft, NTTP, no rebound or guarding noted. Patient denies any fevers or chills currently. High fevers in immediate postoperative period concerning for endometritis, however physical exam benign. UA significant only for blood, no leukocytosis, lactate wnl. VS currently wnl. Given recent CTAP read and new onset nausea and diarrhea, differential also includes GI etiology. Recommend TVUS as recommended by radiology to better evaluate adnexa. Dr. Street to come in and evaluate patient.    Corrine Cortez, PGY4  D/W Dr. Street

## 2021-07-11 NOTE — ED PROVIDER NOTE - PHYSICAL EXAMINATION
GENERAL: Awake, alert, NAD  HEENT: NC/AT, moist mucous membranes, PERRL, EOMI  LUNGS: CTAB, no wheezes or crackles   CARDIAC: RRR, no m/r/g  ABDOMEN: Soft, normal BS, mild suprapubic tenderness, non distended, no rebound, no guarding  BACK: No midline spinal tenderness, no CVA tenderness  EXT: No edema, no calf tenderness, 2+ DP pulses bilaterally, no deformities.  NEURO: A&Ox3. Moving all extremities.  SKIN: Warm and dry. No rash.  PSYCH: Normal affect.

## 2021-07-12 ENCOUNTER — RX RENEWAL (OUTPATIENT)
Age: 56
End: 2021-07-12

## 2021-07-12 DIAGNOSIS — J45.909 UNSPECIFIED ASTHMA, UNCOMPLICATED: ICD-10-CM

## 2021-07-12 DIAGNOSIS — R50.9 FEVER, UNSPECIFIED: ICD-10-CM

## 2021-07-12 DIAGNOSIS — Z29.9 ENCOUNTER FOR PROPHYLACTIC MEASURES, UNSPECIFIED: ICD-10-CM

## 2021-07-12 DIAGNOSIS — E11.9 TYPE 2 DIABETES MELLITUS WITHOUT COMPLICATIONS: ICD-10-CM

## 2021-07-12 DIAGNOSIS — I10 ESSENTIAL (PRIMARY) HYPERTENSION: ICD-10-CM

## 2021-07-12 DIAGNOSIS — E03.9 HYPOTHYROIDISM, UNSPECIFIED: ICD-10-CM

## 2021-07-12 DIAGNOSIS — K52.9 NONINFECTIVE GASTROENTERITIS AND COLITIS, UNSPECIFIED: ICD-10-CM

## 2021-07-12 DIAGNOSIS — G47.33 OBSTRUCTIVE SLEEP APNEA (ADULT) (PEDIATRIC): ICD-10-CM

## 2021-07-12 DIAGNOSIS — F32.9 MAJOR DEPRESSIVE DISORDER, SINGLE EPISODE, UNSPECIFIED: ICD-10-CM

## 2021-07-12 DIAGNOSIS — R59.1 GENERALIZED ENLARGED LYMPH NODES: ICD-10-CM

## 2021-07-12 LAB
A1C WITH ESTIMATED AVERAGE GLUCOSE RESULT: 7.9 % — HIGH (ref 4–5.6)
ALBUMIN SERPL ELPH-MCNC: 4.2 G/DL — SIGNIFICANT CHANGE UP (ref 3.3–5)
ALP SERPL-CCNC: 50 U/L — SIGNIFICANT CHANGE UP (ref 40–120)
ALT FLD-CCNC: 19 U/L — SIGNIFICANT CHANGE UP (ref 10–45)
ANION GAP SERPL CALC-SCNC: 14 MMOL/L — SIGNIFICANT CHANGE UP (ref 5–17)
AST SERPL-CCNC: 25 U/L — SIGNIFICANT CHANGE UP (ref 10–40)
BASE EXCESS BLDV CALC-SCNC: 6.4 MMOL/L — HIGH (ref -2–2)
BASOPHILS # BLD AUTO: 0 K/UL — SIGNIFICANT CHANGE UP (ref 0–0.2)
BASOPHILS NFR BLD AUTO: 0 % — SIGNIFICANT CHANGE UP (ref 0–2)
BILIRUB SERPL-MCNC: 0.9 MG/DL — SIGNIFICANT CHANGE UP (ref 0.2–1.2)
BUN SERPL-MCNC: 13 MG/DL — SIGNIFICANT CHANGE UP (ref 7–23)
CA-I SERPL-SCNC: 1.1 MMOL/L — LOW (ref 1.12–1.3)
CALCIUM SERPL-MCNC: 9.2 MG/DL — SIGNIFICANT CHANGE UP (ref 8.4–10.5)
CHLORIDE BLDV-SCNC: 101 MMOL/L — SIGNIFICANT CHANGE UP (ref 96–108)
CHLORIDE SERPL-SCNC: 92 MMOL/L — LOW (ref 96–108)
CO2 BLDV-SCNC: 33 MMOL/L — HIGH (ref 22–30)
CO2 SERPL-SCNC: 25 MMOL/L — SIGNIFICANT CHANGE UP (ref 22–31)
COVID-19 SPIKE DOMAIN AB INTERP: POSITIVE
COVID-19 SPIKE DOMAIN ANTIBODY RESULT: 175 U/ML — HIGH
CREAT SERPL-MCNC: 0.78 MG/DL — SIGNIFICANT CHANGE UP (ref 0.5–1.3)
CULTURE RESULTS: SIGNIFICANT CHANGE UP
CULTURE RESULTS: SIGNIFICANT CHANGE UP
EOSINOPHIL # BLD AUTO: 0 K/UL — SIGNIFICANT CHANGE UP (ref 0–0.5)
EOSINOPHIL NFR BLD AUTO: 0 % — SIGNIFICANT CHANGE UP (ref 0–6)
ESTIMATED AVERAGE GLUCOSE: 180 MG/DL — HIGH (ref 68–114)
GAS PNL BLDV: 133 MMOL/L — LOW (ref 135–145)
GAS PNL BLDV: SIGNIFICANT CHANGE UP
GLUCOSE BLDC GLUCOMTR-MCNC: 211 MG/DL — HIGH (ref 70–99)
GLUCOSE BLDC GLUCOMTR-MCNC: 222 MG/DL — HIGH (ref 70–99)
GLUCOSE BLDC GLUCOMTR-MCNC: 258 MG/DL — HIGH (ref 70–99)
GLUCOSE BLDC GLUCOMTR-MCNC: 261 MG/DL — HIGH (ref 70–99)
GLUCOSE BLDV-MCNC: 238 MG/DL — HIGH (ref 70–99)
GLUCOSE SERPL-MCNC: 246 MG/DL — HIGH (ref 70–99)
HCO3 BLDV-SCNC: 32 MMOL/L — HIGH (ref 21–29)
HCT VFR BLD CALC: 40.9 % — SIGNIFICANT CHANGE UP (ref 34.5–45)
HCT VFR BLDA CALC: 47 % — SIGNIFICANT CHANGE UP (ref 39–50)
HGB BLD CALC-MCNC: 15.3 G/DL — SIGNIFICANT CHANGE UP (ref 11.5–15.5)
HGB BLD-MCNC: 13.5 G/DL — SIGNIFICANT CHANGE UP (ref 11.5–15.5)
HIV 1+2 AB+HIV1 P24 AG SERPL QL IA: SIGNIFICANT CHANGE UP
LACTATE BLDV-MCNC: 1.8 MMOL/L — SIGNIFICANT CHANGE UP (ref 0.7–2)
LACTATE BLDV-MCNC: 2.8 MMOL/L — HIGH (ref 0.7–2)
LDH SERPL L TO P-CCNC: 333 U/L — HIGH (ref 50–242)
LYMPHOCYTES # BLD AUTO: 0.2 K/UL — LOW (ref 1–3.3)
LYMPHOCYTES # BLD AUTO: 3.5 % — LOW (ref 13–44)
MAGNESIUM SERPL-MCNC: 1.7 MG/DL — SIGNIFICANT CHANGE UP (ref 1.6–2.6)
MANUAL SMEAR VERIFICATION: SIGNIFICANT CHANGE UP
MCHC RBC-ENTMCNC: 29.3 PG — SIGNIFICANT CHANGE UP (ref 27–34)
MCHC RBC-ENTMCNC: 33 GM/DL — SIGNIFICANT CHANGE UP (ref 32–36)
MCV RBC AUTO: 88.7 FL — SIGNIFICANT CHANGE UP (ref 80–100)
MONOCYTES # BLD AUTO: 0.44 K/UL — SIGNIFICANT CHANGE UP (ref 0–0.9)
MONOCYTES NFR BLD AUTO: 7.8 % — SIGNIFICANT CHANGE UP (ref 2–14)
NEUTROPHILS # BLD AUTO: 5.03 K/UL — SIGNIFICANT CHANGE UP (ref 1.8–7.4)
NEUTROPHILS NFR BLD AUTO: 67 % — SIGNIFICANT CHANGE UP (ref 43–77)
NEUTS BAND # BLD: 21.7 % — HIGH (ref 0–8)
OTHER CELLS CSF MANUAL: 6 ML/DL — LOW (ref 18–22)
PCO2 BLDV: 50 MMHG — SIGNIFICANT CHANGE UP (ref 35–50)
PH BLDV: 7.42 — SIGNIFICANT CHANGE UP (ref 7.35–7.45)
PHOSPHATE SERPL-MCNC: 2.9 MG/DL — SIGNIFICANT CHANGE UP (ref 2.5–4.5)
PLAT MORPH BLD: NORMAL — SIGNIFICANT CHANGE UP
PLATELET # BLD AUTO: 121 K/UL — LOW (ref 150–400)
PO2 BLDV: 21 MMHG — LOW (ref 25–45)
POTASSIUM BLDV-SCNC: 3.5 MMOL/L — SIGNIFICANT CHANGE UP (ref 3.5–5.3)
POTASSIUM SERPL-MCNC: 3.1 MMOL/L — LOW (ref 3.5–5.3)
POTASSIUM SERPL-SCNC: 3.1 MMOL/L — LOW (ref 3.5–5.3)
PROT SERPL-MCNC: 7.6 G/DL — SIGNIFICANT CHANGE UP (ref 6–8.3)
RBC # BLD: 4.61 M/UL — SIGNIFICANT CHANGE UP (ref 3.8–5.2)
RBC # FLD: 13.1 % — SIGNIFICANT CHANGE UP (ref 10.3–14.5)
RBC BLD AUTO: SIGNIFICANT CHANGE UP
SAO2 % BLDV: 28 % — LOW (ref 67–88)
SARS-COV-2 IGG+IGM SERPL QL IA: 175 U/ML — HIGH
SARS-COV-2 IGG+IGM SERPL QL IA: POSITIVE
SODIUM SERPL-SCNC: 131 MMOL/L — LOW (ref 135–145)
SPECIMEN SOURCE: SIGNIFICANT CHANGE UP
SPECIMEN SOURCE: SIGNIFICANT CHANGE UP
URATE SERPL-MCNC: 2.8 MG/DL — SIGNIFICANT CHANGE UP (ref 2.5–7)
WBC # BLD: 5.67 K/UL — SIGNIFICANT CHANGE UP (ref 3.8–10.5)
WBC # FLD AUTO: 5.67 K/UL — SIGNIFICANT CHANGE UP (ref 3.8–10.5)

## 2021-07-12 PROCEDURE — 99254 IP/OBS CNSLTJ NEW/EST MOD 60: CPT

## 2021-07-12 PROCEDURE — 99222 1ST HOSP IP/OBS MODERATE 55: CPT

## 2021-07-12 PROCEDURE — 99254 IP/OBS CNSLTJ NEW/EST MOD 60: CPT | Mod: 24

## 2021-07-12 PROCEDURE — 99223 1ST HOSP IP/OBS HIGH 75: CPT | Mod: GC

## 2021-07-12 PROCEDURE — 71250 CT THORAX DX C-: CPT | Mod: 26

## 2021-07-12 RX ORDER — POTASSIUM CHLORIDE 20 MEQ
40 PACKET (EA) ORAL ONCE
Refills: 0 | Status: COMPLETED | OUTPATIENT
Start: 2021-07-12 | End: 2021-07-12

## 2021-07-12 RX ORDER — PIPERACILLIN AND TAZOBACTAM 4; .5 G/20ML; G/20ML
3.38 INJECTION, POWDER, LYOPHILIZED, FOR SOLUTION INTRAVENOUS EVERY 8 HOURS
Refills: 0 | Status: DISCONTINUED | OUTPATIENT
Start: 2021-07-12 | End: 2021-07-13

## 2021-07-12 RX ORDER — ACETAMINOPHEN 500 MG
1000 TABLET ORAL ONCE
Refills: 0 | Status: COMPLETED | OUTPATIENT
Start: 2021-07-12 | End: 2021-07-12

## 2021-07-12 RX ORDER — TIOTROPIUM BROMIDE 18 UG/1
1 CAPSULE ORAL; RESPIRATORY (INHALATION) DAILY
Refills: 0 | Status: DISCONTINUED | OUTPATIENT
Start: 2021-07-12 | End: 2021-07-12

## 2021-07-12 RX ORDER — CEFTRIAXONE 500 MG/1
1000 INJECTION, POWDER, FOR SOLUTION INTRAMUSCULAR; INTRAVENOUS EVERY 24 HOURS
Refills: 0 | Status: DISCONTINUED | OUTPATIENT
Start: 2021-07-12 | End: 2021-07-12

## 2021-07-12 RX ORDER — ALBUTEROL 90 UG/1
1 AEROSOL, METERED ORAL DAILY
Refills: 0 | Status: DISCONTINUED | OUTPATIENT
Start: 2021-07-12 | End: 2021-07-12

## 2021-07-12 RX ORDER — AMLODIPINE BESYLATE 2.5 MG/1
10 TABLET ORAL DAILY
Refills: 0 | Status: DISCONTINUED | OUTPATIENT
Start: 2021-07-12 | End: 2021-07-14

## 2021-07-12 RX ORDER — LEVOTHYROXINE SODIUM 125 MCG
50 TABLET ORAL DAILY
Refills: 0 | Status: DISCONTINUED | OUTPATIENT
Start: 2021-07-12 | End: 2021-07-14

## 2021-07-12 RX ORDER — HYDROCHLOROTHIAZIDE 25 MG
12.5 TABLET ORAL DAILY
Refills: 0 | Status: DISCONTINUED | OUTPATIENT
Start: 2021-07-12 | End: 2021-07-14

## 2021-07-12 RX ORDER — ENOXAPARIN SODIUM 100 MG/ML
40 INJECTION SUBCUTANEOUS DAILY
Refills: 0 | Status: DISCONTINUED | OUTPATIENT
Start: 2021-07-12 | End: 2021-07-12

## 2021-07-12 RX ORDER — ATORVASTATIN CALCIUM 80 MG/1
20 TABLET, FILM COATED ORAL AT BEDTIME
Refills: 0 | Status: DISCONTINUED | OUTPATIENT
Start: 2021-07-12 | End: 2021-07-14

## 2021-07-12 RX ORDER — ALBUTEROL 90 UG/1
3 AEROSOL, METERED ORAL
Qty: 0 | Refills: 0 | DISCHARGE

## 2021-07-12 RX ORDER — ACETAMINOPHEN 500 MG
650 TABLET ORAL EVERY 6 HOURS
Refills: 0 | Status: DISCONTINUED | OUTPATIENT
Start: 2021-07-12 | End: 2021-07-14

## 2021-07-12 RX ORDER — MONTELUKAST 4 MG/1
10 TABLET, CHEWABLE ORAL DAILY
Refills: 0 | Status: DISCONTINUED | OUTPATIENT
Start: 2021-07-12 | End: 2021-07-14

## 2021-07-12 RX ORDER — ASPIRIN/CALCIUM CARB/MAGNESIUM 324 MG
81 TABLET ORAL DAILY
Refills: 0 | Status: DISCONTINUED | OUTPATIENT
Start: 2021-07-12 | End: 2021-07-12

## 2021-07-12 RX ORDER — FLUOXETINE HCL 10 MG
10 CAPSULE ORAL DAILY
Refills: 0 | Status: DISCONTINUED | OUTPATIENT
Start: 2021-07-12 | End: 2021-07-14

## 2021-07-12 RX ORDER — LOSARTAN POTASSIUM 100 MG/1
100 TABLET, FILM COATED ORAL DAILY
Refills: 0 | Status: DISCONTINUED | OUTPATIENT
Start: 2021-07-12 | End: 2021-07-14

## 2021-07-12 RX ORDER — FLUTICASONE FUROATE AND VILANTEROL TRIFENATATE 100; 25 UG/1; UG/1
0 POWDER RESPIRATORY (INHALATION)
Qty: 0 | Refills: 0 | DISCHARGE

## 2021-07-12 RX ORDER — GLYCOPYRROLATE AND FORMOTEROL FUMARATE 9; 4.8 UG/1; UG/1
2 AEROSOL, METERED RESPIRATORY (INHALATION)
Refills: 0 | Status: DISCONTINUED | OUTPATIENT
Start: 2021-07-12 | End: 2021-07-14

## 2021-07-12 RX ORDER — IPRATROPIUM/ALBUTEROL SULFATE 18-103MCG
3 AEROSOL WITH ADAPTER (GRAM) INHALATION DAILY
Refills: 0 | Status: DISCONTINUED | OUTPATIENT
Start: 2021-07-12 | End: 2021-07-12

## 2021-07-12 RX ADMIN — LOSARTAN POTASSIUM 100 MILLIGRAM(S): 100 TABLET, FILM COATED ORAL at 05:41

## 2021-07-12 RX ADMIN — Medication 3: at 17:51

## 2021-07-12 RX ADMIN — Medication 2: at 13:46

## 2021-07-12 RX ADMIN — PIPERACILLIN AND TAZOBACTAM 25 GRAM(S): 4; .5 INJECTION, POWDER, LYOPHILIZED, FOR SOLUTION INTRAVENOUS at 05:42

## 2021-07-12 RX ADMIN — Medication 50 MICROGRAM(S): at 05:41

## 2021-07-12 RX ADMIN — PIPERACILLIN AND TAZOBACTAM 25 GRAM(S): 4; .5 INJECTION, POWDER, LYOPHILIZED, FOR SOLUTION INTRAVENOUS at 22:09

## 2021-07-12 RX ADMIN — Medication 3: at 09:46

## 2021-07-12 RX ADMIN — Medication 400 MILLIGRAM(S): at 05:42

## 2021-07-12 RX ADMIN — Medication 100 MILLIGRAM(S): at 00:16

## 2021-07-12 RX ADMIN — MONTELUKAST 10 MILLIGRAM(S): 4 TABLET, CHEWABLE ORAL at 12:23

## 2021-07-12 RX ADMIN — ENOXAPARIN SODIUM 40 MILLIGRAM(S): 100 INJECTION SUBCUTANEOUS at 12:25

## 2021-07-12 RX ADMIN — PIPERACILLIN AND TAZOBACTAM 25 GRAM(S): 4; .5 INJECTION, POWDER, LYOPHILIZED, FOR SOLUTION INTRAVENOUS at 13:49

## 2021-07-12 RX ADMIN — Medication 40 MILLIEQUIVALENT(S): at 09:46

## 2021-07-12 RX ADMIN — Medication 650 MILLIGRAM(S): at 22:41

## 2021-07-12 RX ADMIN — GLYCOPYRROLATE AND FORMOTEROL FUMARATE 2 PUFF(S): 9; 4.8 AEROSOL, METERED RESPIRATORY (INHALATION) at 05:42

## 2021-07-12 RX ADMIN — Medication 40 MILLIEQUIVALENT(S): at 02:00

## 2021-07-12 RX ADMIN — Medication 81 MILLIGRAM(S): at 12:23

## 2021-07-12 RX ADMIN — Medication 10 MILLIGRAM(S): at 12:23

## 2021-07-12 RX ADMIN — Medication 12.5 MILLIGRAM(S): at 05:41

## 2021-07-12 RX ADMIN — Medication 100 MILLIGRAM(S): at 17:52

## 2021-07-12 RX ADMIN — AMLODIPINE BESYLATE 10 MILLIGRAM(S): 2.5 TABLET ORAL at 05:41

## 2021-07-12 RX ADMIN — Medication 650 MILLIGRAM(S): at 22:16

## 2021-07-12 RX ADMIN — Medication 100 MILLIGRAM(S): at 09:42

## 2021-07-12 RX ADMIN — Medication 650 MILLIGRAM(S): at 13:56

## 2021-07-12 NOTE — H&P ADULT - HISTORY OF PRESENT ILLNESS
54 y/o postmenopausal female with PMHx of T2DM (A1c 7.6% 2021), morbid obesity s/p vertical sleeve gastrectomy in 2018, HTN, HLD, morbid obesity, AQUILES not on CPAP, depression, mild asthma POD#2 from D&C and diagnostic hysteroscopy presenting with fevers at home. Patient states that after the D&C on Friday she was recovering appropriately, tolerating PO, ambulating, denied any pain or vaginal bleeding. Yesterday she was went shopping in the morning, ate lunch, was otherwise feeling OK until at dinner she noted a fever of 103. She had some relief with Tylenol and cold compresses. This morning she was febrile again and presented to the ED. She reports associated nausea,  urinary frequency and urgency. No vomiting or diarrhea. Also reporting diffuse fatigue, frontal pressure-like headache and neck ache, improved with Tylenol. Denies any CP, SOB, cough, focal numbness, tingling, weakness. On arrival to the ED patient febrile to 39.2 and initially tachycardic to 110's.        mild depression (controlled), and endometrial hyperplasia s/p D&C w/ polyp removal by OB two days ago p/w with fever beginning yesterday. Febrile to Tmax 103 at home. Also endorsing mild abdominal cramping, no vaginal discharge or bleeding. Denies N/V/D, CP, SOB, cough, sick contact.   54 y/o postmenopausal female with PMHx of T2DM (A1c 7.6% 2021), morbid obesity s/p vertical sleeve gastrectomy in 2018, HTN, HLD, morbid obesity, AQUILES not on CPAP, depression, mild asthma POD#2 from D&C and polyp removal for endometrial hyperplasia presenting with fevers at home beginning yesterday 7/10. Patient states that after the D&C on Friday she was recovering appropriately, tolerating PO, ambulating, mild abdominal cramping, no vaginal discharge or vaginal bleeding. Yesterday evening, she noted a fever of 103. She had some relief with Tylenol and cold compresses. This morning she was febrile again and presented to the ED. She reports associated nausea, urinary frequency and urgency. No vomiting or diarrhea. Also reporting diffuse fatigue, frontal pressure-like headache and neck ache, improved with Tylenol. Denies any CP, SOB, cough, focal numbness, tingling, weakness, n/v/d, sick contact.     On arrival to the ED, patient febrile to 39.2, initially tachycardic to 110's.        p/w with fever beginning yesterday. Febrile to Tmax 103 at home. Also endorsing mild abdominal cramping, no vaginal discharge or bleeding. Denies N/V/D, CP, SOB, cough, sick contact.   54 y/o postmenopausal female with PMHx of T2DM (A1c 7.6% 2021), morbid obesity s/p vertical sleeve gastrectomy in 2018, HTN, HLD, morbid obesity, AQUILES not on CPAP, depression, mild asthma POD#2 from D&C and polyp removal for thickened endometrial lining presenting with fevers at home beginning yesterday 7/10. Patient states that after the D&C on Friday she was recovering appropriately, tolerating PO, ambulating, mild abdominal cramping, no vaginal discharge or vaginal bleeding. Yesterday evening, she noted a fever of 103. She had some relief with Tylenol and cold compresses. This morning she was febrile again and presented to the ED. She reports associated nausea, urinary frequency and urgency. No vomiting or diarrhea. Also reporting diffuse fatigue, frontal pressure-like headache and neck ache, improved with Tylenol. Denies any CP, SOB, cough, focal numbness, tingling, weakness, n/v/d, sick contact.     On arrival to the ED, patient febrile to 103.2, , -167/57-81, RR 16-22 O2 93-98% on RA. CBC with no leukocytosis, platelets 143, ESR/CRP 48/38, lactate 1.8, UA clean, RVP negative, CT A/P w/ colitis of cecum and proximal ascending colon, prominent left adnexa, splenomegaly and extensive lymphadenopathy, indeterminate partially imaged fat density right pericardial structure. Transvaginal ultrasound reveals echogenic material within the endometrium. CXR unrevealing.        p/w with fever beginning yesterday. Febrile to Tmax 103 at home. Also endorsing mild abdominal cramping, no vaginal discharge or bleeding. Denies N/V/D, CP, SOB, cough, sick contact.   54 y/o postmenopausal female with PMHx of T2DM (A1c 7.6% 2021), morbid obesity s/p vertical sleeve gastrectomy in 2018, HTN, HLD, morbid obesity, AQUILES not on CPAP, depression, mild asthma, recently diagnosed APLS, fibroids POD#2 from D&C for polyp removal/biopsy, presenting with fevers at home beginning yesterday 7/10. Patient states that after the D&C on Friday she was recovering appropriately, tolerating PO, ambulating, mild abdominal cramping, no vaginal discharge or vaginal bleeding. Yesterday evening, she noted a fever of 103. She had some relief with Tylenol and cold compresses. This morning she was febrile again and presented to the ED. She reports associated nausea, urinary frequency and urgency. No vomiting or diarrhea. Also reporting diffuse fatigue, frontal pressure-like headache and neck ache, improved with Tylenol. Denies any CP, SOB, cough, focal numbness, tingling, weakness, n/v/d, sick contact.     On arrival to the ED, patient febrile to 103.2, , -167/57-81, RR 16-22 O2 93-98% on RA. CBC with no leukocytosis, platelets 143, ESR/CRP 48/38, lactate 1.8, UA clean, RVP negative, CT A/P w/ colitis of cecum and proximal ascending colon, prominent left adnexa, splenomegaly and extensive lymphadenopathy, indeterminate partially imaged fat density right pericardial structure. Transvaginal ultrasound reveals echogenic material within the endometrium. CXR unrevealing.        p/w with fever beginning yesterday. Febrile to Tmax 103 at home. Also endorsing mild abdominal cramping, no vaginal discharge or bleeding. Denies N/V/D, CP, SOB, cough, sick contact.   56 y/o postmenopausal female with PMHx of T2DM (A1c 7.6% 2021), morbid obesity s/p vertical sleeve gastrectomy in 2018, HTN, HLD, morbid obesity, AQUILES not on CPAP, depression, mild asthma, recently diagnosed APLS, fibroids POD#2 from D&C for polyp removal/biopsy, presenting with fevers at home beginning yesterday 7/10. Patient states that after the D&C on Friday she was recovering appropriately, tolerating PO, ambulating, mild abdominal cramping, no vaginal discharge or vaginal bleeding. Yesterday evening, she noted a fever of 103. She had some relief with Tylenol and cold compresses. This morning she was febrile again and presented to the ED. She reports associated nausea, urinary frequency and urgency. No vomiting or diarrhea. Also reporting diffuse fatigue, frontal pressure-like headache and neck ache, improved with Tylenol. Denies any CP, SOB, cough, focal numbness, tingling, weakness, n/v/d, sick contact.     On arrival to the ED, patient febrile to 103.2, , -167/57-81, RR 16-22 O2 93-98% on RA. CBC with no leukocytosis, platelets 143, ESR/CRP 48/38, lactate 1.8, UA clean, RVP negative, CT A/P w/ colitis of cecum and proximal ascending colon, prominent left adnexa, splenomegaly and extensive lymphadenopathy, indeterminate partially imaged fat density right pericardial structure. Transvaginal ultrasound reveals echogenic material within the endometrium. CXR unrevealing.    54 y/o postmenopausal female with PMHx of T2DM (A1c 7.6% 2021), morbid obesity s/p vertical sleeve gastrectomy in 2018, HTN, HLD, morbid obesity, AQUILES not on CPAP, depression, mild asthma, recently diagnosed APLS, fibroids POD#2 from D&C for polyp removal/biopsy, presenting with fevers at home beginning yesterday 7/10. Patient states that after the D&C on Friday she was recovering appropriately, tolerating PO, ambulating, mild abdominal cramping, no vaginal discharge or vaginal bleeding. Yesterday evening, she noted a fever of 103. She had some relief with Tylenol and cold compresses. This morning she was febrile again and presented to the ED. She reports associated nausea, urinary frequency and urgency. No vomiting or diarrhea. Also reporting diffuse fatigue, frontal pressure-like headache and neck ache, improved with Tylenol. Denies any CP, SOB, cough, focal numbness, tingling, weakness, n/v/d, sick contact.   Patient is UTD on all cancer screenings, colonoscopy 4 years ago wnl, mammograms and Pap Smears also benign.     On arrival to the ED, patient febrile to 103.2, , -167/57-81, RR 16-22 O2 93-98% on RA. CBC with no leukocytosis, platelets 143, ESR/CRP 48/38, lactate 1.8, UA clean, RVP negative, CT A/P w/ colitis of cecum and proximal ascending colon, prominent left adnexa, splenomegaly and extensive lymphadenopathy, indeterminate partially imaged fat density right pericardial structure. Transvaginal ultrasound reveals echogenic material within the endometrium. CXR unrevealing.    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 Patient states that after the D&C on Friday she was recovering appropriately, tolerating PO, ambulating, mild abdominal cramping, no vaginal discharge or vaginal bleeding. Yesterday evening, she noted a fever of 103. She had some relief with Tylenol and cold compresses. This morning she was febrile again and presented to the ED. She reports associated nausea, urinary frequency and urgency. No vomiting or diarrhea. Also reporting diffuse fatigue, frontal pressure-like headache and neck ache, improved with Tylenol. Denies any CP, SOB, cough, focal numbness, tingling, weakness, n/v/d, sick contact.   Patient is UTD on all cancer screenings, colonoscopy 4 years ago wnl, mammograms and Pap Smears also benign.     On arrival to the ED, patient febrile to 103.2, , -167/57-81, RR 16-22 O2 93-98% on RA. CBC with no leukocytosis, platelets 143, ESR/CRP 48/38, lactate 1.8, UA clean, RVP negative, CT A/P w/ colitis of cecum and proximal ascending colon, prominent left adnexa, splenomegaly and extensive lymphadenopathy, indeterminate partially imaged fat density right pericardial structure. Transvaginal ultrasound reveals echogenic material within the endometrium. CXR unrevealing.    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 Patient states that after the D&C on Friday she was recovering appropriately, tolerating PO, ambulating, mild abdominal cramping, no vaginal discharge or vaginal bleeding. Yesterday evening, she noted a fever of 103. She had some relief with Tylenol and cold compresses. This morning she was febrile again and presented to the ED. She reports associated nausea, urinary frequency and urgency. No vomiting or diarrhea. Also reporting diffuse fatigue, frontal pressure-like headache and neck ache, improved with Tylenol. Denies any CP, SOB, cough, focal numbness, tingling, weakness, n/v/d, sick contact.   Patient is UTD on all cancer screenings, colonoscopy 4 years ago wnl, mammograms and Pap Smears also benign.     On arrival to the ED, patient febrile to 103.2, , -167/57-81, RR 16-22 O2 93-98% on RA. CBC with no leukocytosis, platelets 143, ESR/CRP 48/38, lactate 1.8, UA clean, RVP negative, CT A/P w/ colitis of cecum and proximal ascending colon, prominent left adnexa, splenomegaly and extensive lymphadenopathy, indeterminate partially imaged fat density right pericardial structure. Transvaginal ultrasound reveals echogenic material within the endometrium. CXR unrevealing.

## 2021-07-12 NOTE — H&P ADULT - NSHPREVIEWOFSYSTEMS_GEN_ALL_CORE
REVIEW OF SYSTEMS:    CONSTITUTIONAL: No weakness, +fevers, no chills  EYES/ENT: No visual changes;  No vertigo or throat pain   NECK: No pain or stiffness  RESPIRATORY: No cough, wheezing, hemoptysis; No shortness of breath  CARDIOVASCULAR: No chest pain or palpitations  GASTROINTESTINAL: +abdominal cramping, no epigastric pain. No nausea, vomiting, or hematemesis; No diarrhea or constipation. No melena or hematochezia.  BACK: No CVA tenderness  GENITOURINARY: No dysuria, frequency or hematuria  NEUROLOGICAL: No numbness or weakness  SKIN: No itching, rashes

## 2021-07-12 NOTE — H&P ADULT - PROBLEM SELECTOR PLAN 4
-not on CPAP at home  -can c/w CPAP while inpatient -not on CPAP at home, patient not interested in CPAP  -can offer CPAP while inpatient

## 2021-07-12 NOTE — CONSULT NOTE ADULT - ASSESSMENT
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  CT A/P w/ colitis of cecum and proximal ascending colon, prominent left adnexa, splenomegaly and extensive lymphadenopathy, indeterminate partially imaged fat density right pericardial structure. Transvaginal ultrasound reveals echogenic material within the endometrium. CXR unrevealing.   Dx of lymphomatous process in dd of extensive lymphadenpathy.   Get LDH, uric acid.   CT chest follow up.  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  CT A/P w/ colitis of cecum and proximal ascending colon, prominent left adnexa, splenomegaly and extensive lymphadenopathy, indeterminate partially imaged fat density right pericardial structure. Transvaginal ultrasound reveals echogenic material within the endometrium.  Multiple lymphnodes are present in the supraclavicular region bilaterally.  Multiple lymph nodes are present in the anterior mediastinum, left internal mammary chain, pretracheal space, AP window and in the right cardiophrenic angle. One of the largest lymph node is noted in the anterior mediastinum and measures 2 x 2 centimeters. Heart is normal in size. Calcification of the coronary arteries is noted. Very small pericardial effusion is noted.  Dx of lymphomatous process in dd of extensive lymphadenpathy. given the lack of systemic symptoms except before admission, unlikely to be high grade however get LDH, uric acid.   She will need biopsy, IR or surgery as feasible by anatomy and her body habitus. If lymphoma will be needing enough tissues for immunostains etc  No h/o VTE but will repeat LA w/u

## 2021-07-12 NOTE — H&P ADULT - PROBLEM SELECTOR PLAN 2
-patient s/p D&C, polyp removed for thickened endometrial lining,  CT A/P with extensive lymphadenopathy throughout cardiophrenic, retrocrural, periportal, retroperitoneal, and mesteneric nodes.   -f/u polyp outpatient biopsy results -patient s/p D&C, polyp removed,  CT A/P with extensive lymphadenopathy throughout cardiophrenic, retrocrural, periportal, retroperitoneal, and mesteneric nodes. Patient without B symptoms of night symptoms, unintentional weight loss.   -f/u polyp outpatient biopsy results

## 2021-07-12 NOTE — CONSULT NOTE ADULT - ATTENDING COMMENTS
ct noted  now with diarrhea and colitis  patient ate chick-cristofer-a prior to procedures   suspected infectious colitis  f/u gi pcr  cont antibiotics   will follow

## 2021-07-12 NOTE — H&P ADULT - ASSESSMENT
56 y/o postmenopausal female with PMHx of T2DM (A1c 7.6% 2021), morbid obesity s/p vertical sleeve gastrectomy in 2018, HTN, HLD, morbid obesity, AQUILES not on CPAP, depression, mild asthma POD#2 from D&C and polyp removal for endometrial hyperplasia presenting with fevers at home beginning yesterday 7/10, presents meeting sepsis criteria c/f endometritis.

## 2021-07-12 NOTE — PROGRESS NOTE ADULT - ASSESSMENT
A/P: 55y POD#3 s/p D&C, hysteroscopy presenting with fevers and diarrhea with CT findings suggestive of colitis. Admitted to medicine service for IV abx and workup of lymphadenopathy.    -No acute GYN intervention at this time  -Appreciate care as per primary medicine team  -Please page with any questions or concerns: pager #[phone]    Robyn Frankel PGY3

## 2021-07-12 NOTE — PROGRESS NOTE ADULT - ASSESSMENT
56 y/o postmenopausal female        with PMHx of T2DM (A1c 7.6% 2021), morbid obesity s/p vertical sleeve gastrectomy in 2018, HTN, HLD,       morbid obesity, AQUILES not on CPAP, depression, mild asthma      POD#2 from D&C and polyp removal for endometrial hyperplasia       presenting with fevers at home from  7/10,    endometritis    house  iD  zoie    seen by gyn   CT , with  colitis/  prominent left adnexa/  splenomegaly/ l adenopathy/ pericardial denisty     r/o malignancy   oncology d r ohri   gi d r sheilh/  card  called    echo  on iv  zosyn  echo/  Ct chest       rad< from: US Transvaginal (07.11.21 @ 18:58) >  Other: Multiple collateral vessels in the pelvis.  IMPRESSION:  Echogenic material within the endometrium, likely representing clot in the context of recent D&C.  < end of copied text >     ra< from: CT Abdomen and Pelvis w/ IV Cont (07.11.21 @ 14:48) >  MPRESSION:  Mild wall thickening of the cecum and proximal ascending colon with pericolic inflammatory change, suggesting colitis. Differential includes infectious, inflammatory and ischemic etiology. Given focal nature, if not recently performed, recommend colonoscopy after resolution of symptomatology to exclude underlying lesion.  Left adnexa isprominent and inseparable from multiple collateral vessels. Consider more definitive characterization with pelvic ultrasound.  Splenomegaly and extensive lymphadenopathy of uncertain etiology. Differential includes inflammatory and neoplastic etiologies. Correlate clinically.  A partially imaged fat density right pericardial structure is indeterminate. Consider CT chest for further characterization.  --- End of Report --  < end of copied text >   56 y/o postmenopausal female        with PMHx of T2DM (A1c 7.6% 2021), morbid obesity s/p vertical sleeve gastrectomy in 2018, HTN, HLD,       morbid obesity, AQUILES not on CPAP, depression, mild asthma         D&C and polyp removal  on 7/9, for endometrial hyperplasia , no perforation pe r note       * presenting with fevers at home from  7/10         with  bandemia,  , mild lactate elevation.,  with   resolving sepsis, from  ?  endometritis/ colitis         no abd pain. mild  non bloody diarrhea        house  ID  dr lynn, seen by gyn/ spoke  with dr charles, gyn    *  C t,  with  colitis/  prominent left adnexa/  splenomegaly/ and,  l adenopathy/ pericardial density           need  to  r/o malignancy          oncology dr hughes,   gi dr kendrick/  card  called        echo/  Ct chest    *   HTN/  HLD         on asa, lipitor/ norvasc/ cozaar/  hxtz   *  bmi is  42      on iv  zosyn      pt  well  appearing      rad< from: US Transvaginal (07.11.21 @ 18:58) >  Other: Multiple collateral vessels in the pelvis.  IMPRESSION:  Echogenic material within the endometrium, likely representing clot in the context of recent D&C.  < end of copied text >     ra< from: CT Abdomen and Pelvis w/ IV Cont (07.11.21 @ 14:48) >  MPRESSION:  Mild wall thickening of the cecum and proximal ascending colon with pericolic inflammatory change, suggesting colitis. Differential includes infectious, inflammatory and ischemic etiology. Given focal nature, if not recently performed, recommend colonoscopy after resolution of symptomatology to exclude underlying lesion.  Left adnexa isprominent and inseparable from multiple collateral vessels. Consider more definitive characterization with pelvic ultrasound.  Splenomegaly and extensive lymphadenopathy of uncertain etiology. Differential includes inflammatory and neoplastic etiologies. Correlate clinically.  A partially imaged fat density right pericardial structure is indeterminate. Consider CT chest for further characterization.  --- End of Report --  < end of copied text >   54 y/o postmenopausal female        with PMHx of T2DM (A1c 7.6% 2021), morbid obesity s/p vertical sleeve gastrectomy in 2018, HTN, HLD,       morbid obesity, AQUILES not on CPAP, depression, mild asthma         D&C and polyp removal  on 7/9, for endometrial hyperplasia , no perforation pe r note       * presenting with fevers at home from  7/10         with  bandemia,  , mild lactate elevation.,  with   resolving sepsis, from  ?  endometritis/ colitis         no abd pain. mild  non bloody diarrhea        house  ID  dr lynn, seen by gyn/ spoke  with dr charles, gyn    *  C t,  with  colitis/  prominent left adnexa/  splenomegaly/ and,  l adenopathy/ pericardial density           need  to  r/o malignancy          oncology dr hughes,   gi dr kendrick/  card  called        echo/  Ct chest    *   HTN/  HLD         on asa, lipitor/ norvasc/ cozaar/  hxtz   *  bmi is  40       on iv  zosyn      pt  well  appearing in er/      CT chest,    mediastinal  and  supraclavicular l adenoathy/        need  to  r/o lymphoma     rad< from: CT Chest No Cont (07.12.21 @ 12:45) >  IMPRESSION: Multiple lymph nodes are present in the mediastinum and supraclavicular region bilaterally as described above. Exact etiology is unclear. One of the differential diagnostic consideration includes lymphoma.  Very small pericardial effusion  < end of copied text >      rad< from: US Transvaginal (07.11.21 @ 18:58) >  Other: Multiple collateral vessels in the pelvis.  IMPRESSION:  Echogenic material within the endometrium, likely representing clot in the context of recent D&C.  < end of copied text >     ra< from: CT Abdomen and Pelvis w/ IV Cont (07.11.21 @ 14:48) >  MPRESSION:  Mild wall thickening of the cecum and proximal ascending colon with pericolic inflammatory change, suggesting colitis. Differential includes infectious, inflammatory and ischemic etiology. Given focal nature, if not recently performed, recommend colonoscopy after resolution of symptomatology to exclude underlying lesion.  Left adnexa isprominent and inseparable from multiple collateral vessels. Consider more definitive characterization with pelvic ultrasound.  Splenomegaly and extensive lymphadenopathy of uncertain etiology. Differential includes inflammatory and neoplastic etiologies. Correlate clinically.  A partially imaged fat density right pericardial structure is indeterminate. Consider CT chest for further characterization.  --- End of Report --  < end of copied text >   56 y/o postmenopausal female        with PMHx of T2DM (A1c 7.6% 2021), morbid obesity s/p vertical sleeve gastrectomy in 2018, HTN, HLD,       morbid obesity, AQUILES not on CPAP, depression, mild asthma         D&C and polyp removal  on 7/9, for endometrial hyperplasia , no perforation pe r note       * presenting with fevers at home from  7/10         with  bandemia,  , mild lactate elevation.,  with   resolving sepsis, from  ?  endometritis/ colitis         no abd pain. mild  non bloody diarrhea        house  ID  dr lynn, seen by gyn/ spoke  with dr charles, gyn    *  C t,  with  colitis/  prominent left adnexa/  splenomegaly/ and,  l adenopathy/ pericardial density           need  to  r/o malignancy          oncology dr hughes,   gi dr kendrick/  card  called        echo/  Ct chest    *   HTN/  HLD         on asa, lipitor/ norvasc/ cozaar/  hxtz   *  bmi is  40          fevers,  on iv  zosyn,   pt  well  appearing in er/    *   CT chest,    mediastinal  and  supraclavicular l adenopathy /  Ct a/p,  RP, retrocrural, etc  l adenopathy             need  to  r/o lymphoma          IR  eval  for   l node bx     rad< from: CT Chest No Cont (07.12.21 @ 12:45) >  IMPRESSION: Multiple lymph nodes are present in the mediastinum and supraclavicular region bilaterally as described above. Exact etiology is unclear. One of the differential diagnostic consideration includes lymphoma.  Very small pericardial effusion  < end of copied text >      rad< from: US Transvaginal (07.11.21 @ 18:58) >  Other: Multiple collateral vessels in the pelvis.  IMPRESSION:  Echogenic material within the endometrium, likely representing clot in the context of recent D&C.  < end of copied text >     ra< from: CT Abdomen and Pelvis w/ IV Cont (07.11.21 @ 14:48) >  MPRESSION:  Mild wall thickening of the cecum and proximal ascending colon with pericolic inflammatory change, suggesting colitis. Differential includes infectious, inflammatory and ischemic etiology. Given focal nature, if not recently performed, recommend colonoscopy after resolution of symptomatology to exclude underlying lesion.  Left adnexa isprominent and inseparable from multiple collateral vessels. Consider more definitive characterization with pelvic ultrasound.  Splenomegaly and extensive lymphadenopathy of uncertain etiology. Differential includes inflammatory and neoplastic etiologies. Correlate clinically.  A partially imaged fat density right pericardial structure is indeterminate. Consider CT chest for further characterization.  --- End of Report --  < end of copied text >

## 2021-07-12 NOTE — PROGRESS NOTE ADULT - SUBJECTIVE AND OBJECTIVE BOX
afberile    REVIEW OF SYSTEMS:  GEN: no fever,    no chills  RESP: no SOB,   no cough  CVS: no chest pain,   no palpitations  GI: no abdominal pain,   no nausea,   no vomiting,   no constipation,   no diarrhea  : no dysuria,   no frequency  NEURO: no headache,   no dizziness  PSYCH: no depression,   not anxious  Derm : no rash    MEDICATIONS  (STANDING):  amLODIPine   Tablet 10 milliGRAM(s) Oral daily  aspirin  chewable 81 milliGRAM(s) Oral daily  atorvastatin 20 milliGRAM(s) Oral at bedtime  dextrose 40% Gel 15 Gram(s) Oral once  dextrose 5%. 1000 milliLiter(s) (50 mL/Hr) IV Continuous <Continuous>  dextrose 5%. 1000 milliLiter(s) (100 mL/Hr) IV Continuous <Continuous>  dextrose 50% Injectable 25 Gram(s) IV Push once  dextrose 50% Injectable 12.5 Gram(s) IV Push once  dextrose 50% Injectable 25 Gram(s) IV Push once  enoxaparin Injectable 40 milliGRAM(s) SubCutaneous daily  FLUoxetine 10 milliGRAM(s) Oral daily  glucagon  Injectable 1 milliGRAM(s) IntraMuscular once  glycopyrrolate 9 MICROgram(s)/formoterol 4.8 MICROgram(s) Inhaler 2 Puff(s) Inhalation two times a day  hydrochlorothiazide 12.5 milliGRAM(s) Oral daily  insulin lispro (ADMELOG) corrective regimen sliding scale   SubCutaneous three times a day before meals  insulin lispro (ADMELOG) corrective regimen sliding scale   SubCutaneous at bedtime  levothyroxine 50 MICROGram(s) Oral daily  losartan 100 milliGRAM(s) Oral daily  metroNIDAZOLE  IVPB 500 milliGRAM(s) IV Intermittent every 8 hours  montelukast 10 milliGRAM(s) Oral daily  piperacillin/tazobactam IVPB.. 3.375 Gram(s) IV Intermittent every 8 hours  potassium chloride    Tablet ER 40 milliEquivalent(s) Oral once    MEDICATIONS  (PRN):  acetaminophen   Tablet .. 650 milliGRAM(s) Oral every 6 hours PRN Mild Pain (1 - 3)      Vital Signs Last 24 Hrs  T(C): 37.3 (2021 08:26), Max: 39.6 (2021 22:27)  T(F): 99.1 (2021 08:26), Max: 103.2 (2021 22:27)  HR: 77 (2021 08:26) (77 - 115)  BP: 146/81 (2021 08:26) (111/57 - 167/81)  BP(mean): --  RR: 18 (2021 08:26) (16 - 22)  SpO2: 95% (2021 08:26) (93% - 98%)  CAPILLARY BLOOD GLUCOSE      POCT Blood Glucose.: 215 mg/dL (2021 22:22)    I&O's Summary      PHYSICAL EXAM:  HEAD:  Atraumatic, Normocephalic  NECK: Supple, No   JVD  CHEST/LUNG:   no     rales,     no,    rhonchi  HEART: Regular rate and rhythm;         murmur  ABDOMEN: Soft, Nontender, ;   EXTREMITIES:   no     edema  NEUROLOGY:  alert    LABS:                        13.5   5.67  )-----------( 121      ( 2021 07:08 )             40.9     07-12    131<L>  |  92<L>  |  13  ----------------------------<  246<H>  3.1<L>   |  25  |  0.78    Ca    9.2      2021 07:08  Phos  2.9     07-12  Mg     1.7     07-12    TPro  7.6  /  Alb  4.2  /  TBili  0.9  /  DBili  x   /  AST  25  /  ALT  19  /  AlkPhos  50  07-12          Urinalysis Basic - ( 2021 12:49 )    Color: Yellow / Appearance: Clear / S.019 / pH: x  Gluc: x / Ketone: Negative  / Bili: Negative / Urobili: Negative   Blood: x / Protein: 30 mg/dL / Nitrite: Negative   Leuk Esterase: Negative / RBC: 8 /hpf / WBC 1 /HPF   Sq Epi: x / Non Sq Epi: 1 /hpf / Bacteria: Negative          11 @ 10:44  3.1  38              Consultant(s) Notes Reviewed:      Care Discussed with Consultants/Other Providers:

## 2021-07-12 NOTE — H&P ADULT - NSHPPHYSICALEXAM_GEN_ALL_CORE
Vital Signs Last 24 Hrs  T(C): 38.7 (11 Jul 2021 23:41), Max: 39.6 (11 Jul 2021 22:27)  T(F): 101.7 (11 Jul 2021 23:41), Max: 103.2 (11 Jul 2021 22:27)  HR: 99 (11 Jul 2021 23:41) (81 - 115)  BP: 151/80 (11 Jul 2021 23:41) (111/57 - 167/81)  BP(mean): --  RR: 18 (11 Jul 2021 23:41) (16 - 22)  SpO2: 93% (11 Jul 2021 23:41) (93% - 98%)    GENERAL: Awake, alert, NAD  HEENT: NC/AT, moist mucous membranes, PERRL, EOMI  LUNGS: CTAB, no wheezes or crackles   CARDIAC: RRR, no m/r/g  ABDOMEN: Soft, normal BS, mild suprapubic tenderness, non distended, no rebound, no guarding  BACK: No midline spinal tenderness, no CVA tenderness  EXT: No edema, no calf tenderness, 2+ DP pulses bilaterally, no deformities.  NEURO: A&Ox3. Moving all extremities.  SKIN: Warm and dry. No rash.  PSYCH: Normal affect.

## 2021-07-12 NOTE — H&P ADULT - NSHPSOCIALHISTORY_GEN_ALL_CORE
Former smoker, 1991 0.5 pack a day for 2 years. Drinks alcohol 1-2x monthly. No substance use.   Single, works full time in [employer] sales.

## 2021-07-12 NOTE — CONSULT NOTE ADULT - ASSESSMENT
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 Patient states that after the D&C on Friday she was recovering appropriately, tolerating PO, ambulating, mild abdominal cramping, no vaginal discharge or vaginal bleeding. Yesterday evening, she noted a fever of 103. She had some relief with Tylenol and cold compresses. This morning she was febrile again and presented to the ED. She reports associated nausea, urinary frequency and urgency. No vomiting or diarrhea. Also reporting diffuse fatigue, frontal pressure-like headache and neck ache, improved with Tylenol. Denies any CP, SOB, cough, focal numbness, tingling, weakness, n/v/d, sick contact.   Patient is UTD on all cancer screenings, colonoscopy 4 years ago wnl, mammograms and Pap Smears also benign.   On arrival to the ED, patient febrile to 103.2, , -167/57-81, RR 16-22 O2 93-98% on RA. CBC with no leukocytosis, platelets 143, ESR/CRP 48/38, lactate 1.8, UA clean, RVP negative, CT A/P w/ colitis of cecum and proximal ascending colon, prominent left adnexa, splenomegaly and extensive lymphadenopathy, indeterminate partially imaged fat density right pericardial structure. Transvaginal ultrasound reveals echogenic material within the endometrium. CXR unrevealing.   pt with s/p recent GYN surgery with intermittent chest pain last few months , last stress test years ago  ecg  will adjust bp meds  check lipid  echo/ ct chest to evaluate pericardial abnormality  pt needs ischemia work up with sig cardiac risk factor  DVT prophylaxis

## 2021-07-12 NOTE — PROGRESS NOTE ADULT - SUBJECTIVE AND OBJECTIVE BOX
R2  GYN  Progress Note    POD#3   HD#2    Patient seen and examined at bedside. Endorses persistent fevers overnight with some nausea and diarrhea. Endorses some pelvic pressure, urinary urgency and minimal amount of vaginal spotting. Denies vomiting, chest pain, SOB, vaginal discharge, lightheadedness, dizziness.     Vital Signs Last 24 Hours  T(C): 37.7 (07-12-21 @ 02:02), Max: 39.6 (07-11-21 @ 22:27)  HR: 99 (07-11-21 @ 23:41) (81 - 115)  BP: 151/80 (07-11-21 @ 23:41) (111/57 - 167/81)  RR: 18 (07-11-21 @ 23:41) (16 - 22)  SpO2: 93% (07-11-21 @ 23:41) (93% - 98%)    I&O's Summary      Physical Exam:  General: NAD  CV: RR, S1, S2  Lungs: CTA b/l, good air flow b/l   Abdomen: Soft, mildly-tender to palpation diffusely, nondistended, normoactive bowel sounds  : no bleeding on pad  Ext: warm and well perfused    Labs:                        14.0   6.70  )-----------( 143      ( 11 Jul 2021 10:43 )             40.3   baso 0.3    eos 2.7    imm gran 0.7    lymph 13.3   mono 8.5    poly 74.5       MEDICATIONS  (STANDING):  amLODIPine   Tablet 10 milliGRAM(s) Oral daily  aspirin  chewable 81 milliGRAM(s) Oral daily  atorvastatin 20 milliGRAM(s) Oral at bedtime  dextrose 40% Gel 15 Gram(s) Oral once  dextrose 5%. 1000 milliLiter(s) (50 mL/Hr) IV Continuous <Continuous>  dextrose 5%. 1000 milliLiter(s) (100 mL/Hr) IV Continuous <Continuous>  dextrose 50% Injectable 25 Gram(s) IV Push once  dextrose 50% Injectable 12.5 Gram(s) IV Push once  dextrose 50% Injectable 25 Gram(s) IV Push once  enoxaparin Injectable 40 milliGRAM(s) SubCutaneous daily  FLUoxetine 10 milliGRAM(s) Oral daily  glucagon  Injectable 1 milliGRAM(s) IntraMuscular once  glycopyrrolate 9 MICROgram(s)/formoterol 4.8 MICROgram(s) Inhaler 2 Puff(s) Inhalation two times a day  hydrochlorothiazide 12.5 milliGRAM(s) Oral daily  insulin lispro (ADMELOG) corrective regimen sliding scale   SubCutaneous three times a day before meals  insulin lispro (ADMELOG) corrective regimen sliding scale   SubCutaneous at bedtime  levothyroxine 50 MICROGram(s) Oral daily  losartan 100 milliGRAM(s) Oral daily  metroNIDAZOLE  IVPB 500 milliGRAM(s) IV Intermittent every 8 hours  montelukast 10 milliGRAM(s) Oral daily  piperacillin/tazobactam IVPB.. 3.375 Gram(s) IV Intermittent every 8 hours    MEDICATIONS  (PRN):  acetaminophen   Tablet .. 650 milliGRAM(s) Oral every 6 hours PRN Mild Pain (1 - 3)

## 2021-07-12 NOTE — CONSULT NOTE ADULT - ASSESSMENT
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 Patient states that after the D&C on Friday she was recovering appropriately, tolerating PO, ambulating, mild abdominal cramping, no vaginal discharge or vaginal bleeding. Yesterday evening, she noted a fever of 103. She had some relief with Tylenol and cold compresses. This morning she was febrile again and presented to the ED. She reports associated nausea, urinary frequency and urgency. No vomiting or diarrhea. Also reporting diffuse fatigue, frontal pressure-like headache and neck ache, improved with Tylenol. Denies any CP, SOB, cough, focal numbness, tingling, weakness, n/v/d, sick contact.   Patient is UTD on all cancer screenings, colonoscopy 4 years ago wnl, mammograms and Pap Smears also benign.     On arrival to the ED, patient febrile to 103.2, , -167/57-81, RR 16-22 O2 93-98% on RA. CBC with no leukocytosis, platelets 143, ESR/CRP 48/38, lactate 1.8, UA clean, RVP negative, CT A/P w/ colitis of cecum and proximal ascending colon, prominent left adnexa, splenomegaly and extensive lymphadenopathy, indeterminate partially imaged fat density right pericardial structure. Transvaginal ultrasound reveals echogenic material within the endometrium. CXR unrevealing.    (12 Jul 2021 00:12)    Pt recalls having chick fillet for lunch on Saturday . Pt denies antibiotics prior to the procedure. Pt still with loose stool but temps are less on antibiotics    A/P  1) Fever  Pt appears to have colitis on CT and clinically with diarrhea  check stool - GI- PCR , check C diff  follow lactate  GI is seeing   unclear how the colitis is related to the procedure  check bc x 2 sets  continue abs for now    2) Lymphadenopathy/ splenomegally  check LDH  check DWAYNE  check EBV serology, check toxo serology  HIV is negative  RVP/SARS CoV2 - negative   ?PET scan  check ACE  check quanteferon    3? s/p GYN procedure  monitor for bleeding  GYN is following    4) pericardial effusion  check cardiac echo    Yoselyn Frazier M.D. ,   Pager [phone]     after 5PM/ weekends [phone]      Assessment and plan discussed with the primary team .         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 Patient states that after the D&C on Friday she was recovering appropriately, tolerating PO, ambulating, mild abdominal cramping, no vaginal discharge or vaginal bleeding. Yesterday evening, she noted a fever of 103. She had some relief with Tylenol and cold compresses. This morning she was febrile again and presented to the ED. She reports associated nausea, urinary frequency and urgency. No vomiting or diarrhea. Also reporting diffuse fatigue, frontal pressure-like headache and neck ache, improved with Tylenol. Denies any CP, SOB, cough, focal numbness, tingling, weakness, n/v/d, sick contact.   Patient is UTD on all cancer screenings, colonoscopy 4 years ago wnl, mammograms and Pap Smears also benign.     On arrival to the ED, patient febrile to 103.2, , -167/57-81, RR 16-22 O2 93-98% on RA. CBC with no leukocytosis, platelets 143, ESR/CRP 48/38, lactate 1.8, UA clean, RVP negative, CT A/P w/ colitis of cecum and proximal ascending colon, prominent left adnexa, splenomegaly and extensive lymphadenopathy, indeterminate partially imaged fat density right pericardial structure. Transvaginal ultrasound reveals echogenic material within the endometrium. CXR unrevealing.    (12 Jul 2021 00:12)    Pt recalls having chick fillet for lunch on Saturday . Pt denies antibiotics prior to the procedure. Pt still with loose stool but temps are less on antibiotics    A/P  1) Fever  Pt appears to have colitis on CT and clinically with diarrhea  check stool - GI- PCR , check C diff  follow lactate  GI is seeing   unclear how the colitis is related to the procedure  check bc x 2 sets  continue abs for now    2) Lymphadenopathy/ splenomegally  check LDH  check DWAYNE  check EBV serology, check toxo serology  HIV is negative  RVP/SARS CoV2 - negative   ?PET scan  check ACE  check quanteferon  will likely need LN biopsy    3? s/p GYN procedure  monitor for bleeding  GYN is following    4) pericardial effusion  check cardiac echo    Yoselyn Frazier M.D. ,   Pager [phone]     after 5PM/ weekends [phone]      Assessment and plan discussed with the primary team .

## 2021-07-12 NOTE — H&P ADULT - ATTENDING COMMENTS
electronic sepsis due to colitis, POD#2 D&C of fibroid with concern for endometritis, LAP  - will treat with Zosyn and Flagyl pending Cx's  - TTE to eval pericardial fat density  - f/u biopsy and monitor LAP on Abx

## 2021-07-12 NOTE — H&P ADULT - NSHPOUTPATIENTPROVIDERS_GEN_ALL_CORE
Dr. Cassie Garza, PCP Dr. Cassie Garza, PCP  Dr. Saul- gynecologist   Dr. Cassie Garza, PCP  Dr. Saul- gynecologist

## 2021-07-12 NOTE — CONSULT NOTE ADULT - SUBJECTIVE AND OBJECTIVE BOX
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 Patient states that after the D&C on Friday she was recovering appropriately, tolerating PO, ambulating, mild abdominal cramping, no vaginal discharge or vaginal bleeding. Yesterday evening, she noted a fever of 103. She had some relief with Tylenol and cold compresses. This morning she was febrile again and presented to the ED. She reports associated nausea, urinary frequency and urgency. No vomiting or diarrhea. Also reporting diffuse fatigue, frontal pressure-like headache and neck ache, improved with Tylenol. Denies any CP, SOB, cough, focal numbness, tingling, weakness, n/v/d, sick contact.   Patient is UTD on all cancer screenings, colonoscopy 4 years ago wnl, mammograms and Pap Smears also benign.   On arrival to the ED, patient febrile to 103.2, , -167/57-81, RR 16-22 O2 93-98% on RA. CBC with no leukocytosis, platelets 143, ESR/CRP 48/38, lactate 1.8, UA clean, RVP negative, CT A/P w/ colitis of cecum and proximal ascending colon, prominent left adnexa, splenomegaly and extensive lymphadenopathy, indeterminate partially imaged fat density right pericardial structure. Transvaginal ultrasound reveals echogenic material within the endometrium. CXR unrevealing.         PAST MEDICAL & SURGICAL HISTORY:  HTN (hypertension)    Diabetes    Depression    Hypothyroid    Asthma  well controlled    Hypokalemia    Morbidly obese    AQUILES (obstructive sleep apnea)    Hyperlipidemia    S/P tonsillectomy    S/P laparoscopic sleeve gastrectomy  2018    History of ectopic pregnancy        MEDICATIONS  (STANDING):  amLODIPine   Tablet 10 milliGRAM(s) Oral daily  aspirin  chewable 81 milliGRAM(s) Oral daily  atorvastatin 20 milliGRAM(s) Oral at bedtime  dextrose 40% Gel 15 Gram(s) Oral once  dextrose 5%. 1000 milliLiter(s) (50 mL/Hr) IV Continuous <Continuous>  dextrose 5%. 1000 milliLiter(s) (100 mL/Hr) IV Continuous <Continuous>  dextrose 50% Injectable 25 Gram(s) IV Push once  dextrose 50% Injectable 12.5 Gram(s) IV Push once  dextrose 50% Injectable 25 Gram(s) IV Push once  enoxaparin Injectable 40 milliGRAM(s) SubCutaneous daily  FLUoxetine 10 milliGRAM(s) Oral daily  glucagon  Injectable 1 milliGRAM(s) IntraMuscular once  glycopyrrolate 9 MICROgram(s)/formoterol 4.8 MICROgram(s) Inhaler 2 Puff(s) Inhalation two times a day  hydrochlorothiazide 12.5 milliGRAM(s) Oral daily  insulin lispro (ADMELOG) corrective regimen sliding scale   SubCutaneous three times a day before meals  insulin lispro (ADMELOG) corrective regimen sliding scale   SubCutaneous at bedtime  levothyroxine 50 MICROGram(s) Oral daily  losartan 100 milliGRAM(s) Oral daily  metroNIDAZOLE  IVPB 500 milliGRAM(s) IV Intermittent every 8 hours  montelukast 10 milliGRAM(s) Oral daily  piperacillin/tazobactam IVPB.. 3.375 Gram(s) IV Intermittent every 8 hours    MEDICATIONS  (PRN):  acetaminophen   Tablet .. 650 milliGRAM(s) Oral every 6 hours PRN Mild Pain (1 - 3)      FAMILY HISTORY:  No pertinent family history in first degree relatives        SOCIAL HISTORY:    [ ] Non-smoker  [ ] Smoker  [ ] Alcohol    Allergies    No Known Allergies    Intolerances    amoxicillin (Nausea)  Avelox (Other)  	    REVIEW OF SYSTEMS:  CONSTITUTIONAL: No fever, weight loss, or fatigue  EYES: No eye pain, visual disturbances, or discharge  ENT:  No difficulty hearing, tinnitus, vertigo; No sinus or throat pain  NECK: No pain or stiffness  RESPIRATORY: No cough, wheezing, chills or hemoptysis; No Shortness of Breath  CARDIOVASCULAR: + chest pain, no palpitations, passing out, dizziness, or leg swelling  GASTROINTESTINAL: No abdominal or epigastric pain. No nausea, vomiting, or hematemesis; No diarrhea or constipation. No melena or hematochezia.  GENITOURINARY: No dysuria, frequency, hematuria, or incontinence  NEUROLOGICAL: No headaches, memory loss, loss of strength, numbness, or tremors  SKIN: No itching, burning, rashes, or lesions   LYMPH Nodes: No enlarged glands  ENDOCRINE: No heat or cold intolerance; No hair loss  MUSCULOSKELETAL: No joint pain or swelling; No muscle, back, or extremity pain  PSYCHIATRIC: No depression, anxiety, mood swings, or difficulty sleeping  HEME/LYMPH: No easy bruising, or bleeding gums  ALLERGY AND IMMUNOLOGIC: No hives or eczema	    [ ] All others negative	  [ ] Unable to obtain    PHYSICAL EXAM:  T(C): 37.3 (07-12-21 @ 08:26), Max: 39.6 (07-11-21 @ 22:27)  HR: 77 (07-12-21 @ 08:26) (77 - 99)  BP: 146/81 (07-12-21 @ 08:26) (111/57 - 167/81)  RR: 18 (07-12-21 @ 08:26) (16 - 20)  SpO2: 95% (07-12-21 @ 08:26) (93% - 98%)  Wt(kg): --  I&O's Summary      Appearance: Normal	  HEENT:   Normal oral mucosa, PERRL, EOMI	  Lymphatic: No lymphadenopathy  Cardiovascular: Normal S1 S2, No JVD, + murmurs, No edema  Respiratory: Lungs clear to auscultation	  Psychiatry: A & O x 3, Mood & affect appropriate  Gastrointestinal:  Soft, Non-tender, + BS	  Skin: No rashes, No ecchymoses, No cyanosis	  Neurologic: Non-focal  Extremities: Normal range of motion, No clubbing, cyanosis or edema  Vascular: Peripheral pulses palpable 2+ bilaterally    TELEMETRY: 	    ECG:  	  RADIOLOGY:  OTHER: 	  	  LABS:	 	    CARDIAC MARKERS:                              13.5   5.67  )-----------( 121      ( 12 Jul 2021 07:08 )             40.9     07-12    131<L>  |  92<L>  |  13  ----------------------------<  246<H>  3.1<L>   |  25  |  0.78    Ca    9.2      12 Jul 2021 07:08  Phos  2.9     07-12  Mg     1.7     07-12    TPro  7.6  /  Alb  4.2  /  TBili  0.9  /  DBili  x   /  AST  25  /  ALT  19  /  AlkPhos  50  07-12    proBNP:   Lipid Profile:   HgA1c:   TSH:       PREVIOUS DIAGNOSTIC TESTING:    < from: Xray Chest 1 View- PORTABLE-Urgent (Xray Chest 1 View- PORTABLE-Urgent .) (07.11.21 @ 11:19) >  Heart mediastinum not adequately evaluated on this AP film. No consolidation pneumothorax or effusion.    IMPRESSION: No active disease  < from: CT Abdomen and Pelvis w/ IV Cont (07.11.21 @ 14:48) >  Mild wall thickening of the cecum and proximal ascending colon with pericolic inflammatory change, suggesting colitis. Differential includes infectious, inflammatory and ischemic etiology. Given focal nature, if not recently performed, recommend colonoscopy after resolution of symptomatology to exclude underlying lesion.    Left adnexa isprominent and inseparable from multiple collateral vessels. Consider more definitive characterization with pelvic ultrasound.    Splenomegaly and extensive lymphadenopathy of uncertain etiology. Differential includes inflammatory and neoplastic etiologies. Correlate clinically.    A partially imaged fat density right pericardial structure is indeterminate. Consider CT chest for further characterization.

## 2021-07-12 NOTE — H&P ADULT - PROBLEM SELECTOR PLAN 1
-presenting with fever up to 103F, tachycardia, elevated ESR/CRP c/f infectious process in the endometrium given recent history of D&C, also radiographic evidence of colitis on CT A/P, if infectious workup negative consider tumor fever as patient with some concern for malignancy given endometrial hyperplasia and lymphadenopathy on CT A/P  -s/p ceftriaxone, flagyl and zosyn in the ED. c/w zosyn and flagyl for now.   -f/u blood cultures, urine culture (although UA negative)  -consider CT chest/TTE for source if infectious workup negative as a nonspecific pericardial fat density noted

## 2021-07-12 NOTE — CHART NOTE - NSCHARTNOTEFT_GEN_A_CORE
Lovenox on hold awaiting biopsy with IR { left supraclavicular region lymph } Lovenox and Aspirin on hold awaiting biopsy with IR { left supraclavicular region lymph }

## 2021-07-12 NOTE — CONSULT NOTE ADULT - SUBJECTIVE AND OBJECTIVE BOX
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 Patient states that after the D&C on Friday she was recovering appropriately, tolerating PO, ambulating, mild abdominal cramping, no vaginal discharge or vaginal bleeding. Yesterday evening, she noted a fever of 103. She had some relief with Tylenol and cold compresses. This morning she was febrile again and presented to the ED. She reports associated nausea, urinary frequency and urgency. No vomiting or diarrhea. Also reporting diffuse fatigue, frontal pressure-like headache and neck ache, improved with Tylenol. Denies any CP, SOB, cough, focal numbness, tingling, weakness, n/v/d, sick contact.   Patient is UTD on all cancer screenings, colonoscopy 4 years ago wnl, mammograms and Pap Smears also benign.     On arrival to the ED, patient febrile to 103.2, , -167/57-81, RR 16-22 O2 93-98% on RA. CBC with no leukocytosis, platelets 143, ESR/CRP 48/38, lactate 1.8, UA clean, RVP negative, CT A/P w/ colitis of cecum and proximal ascending colon, prominent left adnexa, splenomegaly and extensive lymphadenopathy, indeterminate partially imaged fat density right pericardial structure. Transvaginal ultrasound reveals echogenic material within the endometrium. CXR unrevealing.    (2021 00:12)      PAST MEDICAL & SURGICAL HISTORY:  HTN (hypertension)    Diabetes    Depression    Hypothyroid    Asthma  well controlled    Hypokalemia    Morbidly obese    AQUILES (obstructive sleep apnea)    Hyperlipidemia    S/P tonsillectomy    S/P laparoscopic sleeve gastrectomy  2018    History of ectopic pregnancy        Social history:   Social History:    Marital Status:   Occupation:   Lives with:     Substance Use :  Tobacco Usage:  (   ) never smoked   (   ) former smoker   (   ) current smoker  (     ) pack year  (        ) last tobacco use date  Alcohol Usage:  Travel:  Pets:          FAMILY HISTORY:  No pertinent family history in first degree relatives        REVIEW OF SYSTEMS  General:	Denies any malaise fatigue or chills. Fevers absent    Skin:No rash  	  Ophthalmologic:Denies any visual complaints,discharge redness or photophobia  	  ENMT:No nasal discharge,headache,sinus congestion or throat pain.No dental complaints    Respiratory and Thorax:No cough,sputum or chest pain.Denies shortness of breath  	  Cardiovascular:	No chest pain,palpitaions or dizziness    Gastrointestinal:	NO nausea,abdominal pain or diarrhea.    Genitourinary:	No dysuria,frequency. No flank pain    Musculoskeletal:	No joint swelling or pain.No weakness    Neurological:No confusion,diziness.No extremity weakness.No bladder or bowel incontinence	    Psychiatric:No delusions or hallucinations	    Hematology/Lymphatics:	No LN swelling.No gum bleeding     Endocrine:	No recent weight gain or loss.No abnormal heat/cold intolerance    Allergic/Immunologic:	No hives or rash     Allergies    No Known Allergies    Intolerances    amoxicillin (Nausea)  Avelox (Other)      Antimicrobials:       MEDICATIONS  (prior antimicrobials ):    cefTRIAXone   IVPB   100 mL/Hr IV Intermittent (21 @ 13:35)    metroNIDAZOLE  IVPB   100 mL/Hr IV Intermittent (21 @ 09:42)   100 mL/Hr IV Intermittent (21 @ 00:16)    piperacillin/tazobactam IVPB.   200 mL/Hr IV Intermittent (21 @ 22:24)    piperacillin/tazobactam IVPB..   25 mL/Hr IV Intermittent (21 @ 05:42)             metroNIDAZOLE  IVPB 500 milliGRAM(s) IV Intermittent every 8 hours  piperacillin/tazobactam IVPB.. 3.375 Gram(s) IV Intermittent every 8 hours      MEDICATIONS  (STANDING):  amLODIPine   Tablet 10 milliGRAM(s) Oral daily  aspirin  chewable 81 milliGRAM(s) Oral daily  atorvastatin 20 milliGRAM(s) Oral at bedtime  dextrose 40% Gel 15 Gram(s) Oral once  dextrose 5%. 1000 milliLiter(s) (50 mL/Hr) IV Continuous <Continuous>  dextrose 5%. 1000 milliLiter(s) (100 mL/Hr) IV Continuous <Continuous>  dextrose 50% Injectable 25 Gram(s) IV Push once  dextrose 50% Injectable 12.5 Gram(s) IV Push once  dextrose 50% Injectable 25 Gram(s) IV Push once  enoxaparin Injectable 40 milliGRAM(s) SubCutaneous daily  FLUoxetine 10 milliGRAM(s) Oral daily  glucagon  Injectable 1 milliGRAM(s) IntraMuscular once  glycopyrrolate 9 MICROgram(s)/formoterol 4.8 MICROgram(s) Inhaler 2 Puff(s) Inhalation two times a day  hydrochlorothiazide 12.5 milliGRAM(s) Oral daily  insulin lispro (ADMELOG) corrective regimen sliding scale   SubCutaneous three times a day before meals  insulin lispro (ADMELOG) corrective regimen sliding scale   SubCutaneous at bedtime  levothyroxine 50 MICROGram(s) Oral daily  losartan 100 milliGRAM(s) Oral daily  metroNIDAZOLE  IVPB 500 milliGRAM(s) IV Intermittent every 8 hours  montelukast 10 milliGRAM(s) Oral daily  piperacillin/tazobactam IVPB.. 3.375 Gram(s) IV Intermittent every 8 hours    MEDICATIONS  (PRN):  acetaminophen   Tablet .. 650 milliGRAM(s) Oral every 6 hours PRN Mild Pain (1 - 3)        Vital Signs Last 24 Hrs  T(C): 37.3 (2021 08:26), Max: 39.6 (2021 22:27)  T(F): 99.1 (2021 08:26), Max: 103.2 (2021 22:27)  HR: 77 (2021 08:26) (77 - 99)  BP: 146/81 (2021 08:26) (111/57 - 167/81)  BP(mean): --  RR: 18 (2021 08:26) (18 - 20)  SpO2: 95% (2021 08:26) (93% - 98%)    PHYSICAL EXAM:Pleasant patient in no acute distress.      Constitutional:Comfortable.Awake and alert  No cachexia     Eyes:PERRL EOMI.NO discharge or conjunctival injection    ENMT:No sinus tenderness.No thrush.No pharyngeal exudate or erythema.Fair dental hygiene    Neck:Supple,No LN,no JVD      Respiratory:Good air entry bilaterally,CTA    Cardiovascular:S1 S2 wnl, No murmurs,rub or gallops    Gastrointestinal:Soft BS(+) no tenderness no masses ,No rebound or guarding    Genitourinary:No CVA tendereness     Rectal:    Extremities:No cyanosis,clubbing or edema.    Vascular:peripheral pulses felt    Neurological:AAO X 3,No grossly focal deficits    Skin:No rash     Lymph Nodes:No palpable LNs    Musculoskeletal:No joint swelling or LOM    Psychiatric:Affect normal.                                13.5   5.67  )-----------( 121      ( 2021 07:08 )             40.9     LIVER FUNCTIONS - ( 2021 07:08 )  Alb: 4.2 g/dL / Pro: 7.6 g/dL / ALK PHOS: 50 U/L / ALT: 19 U/L / AST: 25 U/L / GGT: x             07-12    131<L>  |  92<L>  |  13  ----------------------------<  246<H>  3.1<L>   |  25  |  0.78    Ca    9.2      2021 07:08  Phos  2.9     07-12  Mg     1.7     07-12    TPro  7.6  /  Alb  4.2  /  TBili  0.9  /  DBili  x   /  AST  25  /  ALT  19  /  AlkPhos  50  07-12          Urinalysis Basic - ( 2021 12:49 )    Color: Yellow / Appearance: Clear / S.019 / pH: x  Gluc: x / Ketone: Negative  / Bili: Negative / Urobili: Negative   Blood: x / Protein: 30 mg/dL / Nitrite: Negative   Leuk Esterase: Negative / RBC: 8 /hpf / WBC 1 /HPF   Sq Epi: x / Non Sq Epi: 1 /hpf / Bacteria: Negative        RECENT CULTURES:      MICROBIOLOGY:          Radiology:             Patient is a 55y old  Female who presents with a chief complaint of Fevers (2021 11:39)      HPI:  54 y/o postmenopausal female with PMHx of T2DM (A1c 7.6% ), morbid obesity s/p vertical sleeve gastrectomy in 2018, HTN, HLD, morbid obesity, AQUILES not on CPAP, depression, mild asthma, 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 Patient states that after the D&C on Friday she was recovering appropriately, tolerating PO, ambulating, mild abdominal cramping, no vaginal discharge or vaginal bleeding. Yesterday evening, she noted a fever of 103. She had some relief with Tylenol and cold compresses. This morning she was febrile again and presented to the ED. She reports associated nausea, urinary frequency and urgency. No vomiting or diarrhea. Also reporting diffuse fatigue, frontal pressure-like headache and neck ache, improved with Tylenol. Denies any CP, SOB, cough, focal numbness, tingling, weakness, n/v/d, sick contact.   Patient is UTD on all cancer screenings, colonoscopy 4 years ago wnl, mammograms and Pap Smears also benign.     On arrival to the ED, patient febrile to 103.2, , -167/57-81, RR 16-22 O2 93-98% on RA. CBC with no leukocytosis, platelets 143, ESR/CRP 48/38, lactate 1.8, UA clean, RVP negative, CT A/P w/ colitis of cecum and proximal ascending colon, prominent left adnexa, splenomegaly and extensive lymphadenopathy, indeterminate partially imaged fat density right pericardial structure. Transvaginal ultrasound reveals echogenic material within the endometrium. CXR unrevealing.    (2021 00:12)    Pt recalls having chick fillet for lunch on Saturday . Pt denies antibiotics prior to the procedure    PAST MEDICAL & SURGICAL HISTORY:  HTN (hypertension)    Diabetes    Depression    Hypothyroid    Asthma  well controlled    Hypokalemia    Morbidly obese    AQUILES (obstructive sleep apnea)    Hyperlipidemia    S/P tonsillectomy    S/P laparoscopic sleeve gastrectomy  2018    History of ectopic pregnancy        Social history:   Marital Status: single   Occupation: works for [employer] sales  Lives with: self    Substance Use : denes  Tobacco Usage:  (   ) never smoked   ( x  ) former smoker- briefly when she was in her early 20s   (   ) current smoker  (     ) pack year  (        ) last tobacco use date  Alcohol Usage: denies  Pets: 2 dogs          FAMILY HISTORY:  mother  lung cancer  father  - MVA  brother- s/p liver transplant         REVIEW OF SYSTEMS  General:	fevers    Skin:No rash  	  Ophthalmologic:Denies any visual complaints,discharge redness or photophobia  	  ENMT mild sore throat     Respiratory and Thorax:No cough,sputum or chest pain.Denies shortness of breath  	  Cardiovascular:	No chest pain,palpitaions or dizziness    Gastrointestinal:	diarrhea, some cramps  no blood in stool    Genitourinary:	No dysuria,frequency. No flank pain no vaginal discharge    Musculoskeletal:	No joint swelling or pain.No weakness    Neurological:No confusion,diziness.No extremity weakness.No bladder or bowel incontinence	    Psychiatric:No delusions or hallucinations	    Hematology/Lymphatics:	No LN swelling.No gum bleeding     Endocrine:	No recent weight gain or loss.No abnormal heat/cold intolerance    Allergic/Immunologic:	No hives or rash     Allergies    No Known Allergies    Intolerances    amoxicillin (Nausea)  Avelox (Other)      Antimicrobials:       MEDICATIONS  (prior antimicrobials ):    cefTRIAXone   IVPB   100 mL/Hr IV Intermittent (21 @ 13:35)    metroNIDAZOLE  IVPB   100 mL/Hr IV Intermittent (21 @ 09:42)   100 mL/Hr IV Intermittent (21 @ 00:16)    piperacillin/tazobactam IVPB.   200 mL/Hr IV Intermittent (21 @ 22:24)    piperacillin/tazobactam IVPB..   25 mL/Hr IV Intermittent (21 @ 05:42)             metroNIDAZOLE  IVPB 500 milliGRAM(s) IV Intermittent every 8 hours  piperacillin/tazobactam IVPB.. 3.375 Gram(s) IV Intermittent every 8 hours      MEDICATIONS  (STANDING):  amLODIPine   Tablet 10 milliGRAM(s) Oral daily  aspirin  chewable 81 milliGRAM(s) Oral daily  atorvastatin 20 milliGRAM(s) Oral at bedtime  dextrose 40% Gel 15 Gram(s) Oral once  dextrose 5%. 1000 milliLiter(s) (50 mL/Hr) IV Continuous <Continuous>  dextrose 5%. 1000 milliLiter(s) (100 mL/Hr) IV Continuous <Continuous>  dextrose 50% Injectable 25 Gram(s) IV Push once  dextrose 50% Injectable 12.5 Gram(s) IV Push once  dextrose 50% Injectable 25 Gram(s) IV Push once  enoxaparin Injectable 40 milliGRAM(s) SubCutaneous daily  FLUoxetine 10 milliGRAM(s) Oral daily  glucagon  Injectable 1 milliGRAM(s) IntraMuscular once  glycopyrrolate 9 MICROgram(s)/formoterol 4.8 MICROgram(s) Inhaler 2 Puff(s) Inhalation two times a day  hydrochlorothiazide 12.5 milliGRAM(s) Oral daily  insulin lispro (ADMELOG) corrective regimen sliding scale   SubCutaneous three times a day before meals  insulin lispro (ADMELOG) corrective regimen sliding scale   SubCutaneous at bedtime  levothyroxine 50 MICROGram(s) Oral daily  losartan 100 milliGRAM(s) Oral daily  metroNIDAZOLE  IVPB 500 milliGRAM(s) IV Intermittent every 8 hours  montelukast 10 milliGRAM(s) Oral daily  piperacillin/tazobactam IVPB.. 3.375 Gram(s) IV Intermittent every 8 hours    MEDICATIONS  (PRN):  acetaminophen   Tablet .. 650 milliGRAM(s) Oral every 6 hours PRN Mild Pain (1 - 3)        Vital Signs Last 24 Hrs  T(C): 37.3 (2021 08:26), Max: 39.6 (2021 22:27)  T(F): 99.1 (2021 08:26), Max: 103.2 (2021 22:27)  HR: 77 (2021 08:26) (77 - 99)  BP: 146/81 (2021 08:26) (111/57 - 167/81)  BP(mean): --  RR: 18 (2021 08:26) (18 - 20)  SpO2: 95% (2021 08:26) (93% - 98%)    PHYSICAL EXAM:Pleasant patient in no acute distress. face flushed       Constitutional:Comfortable.Awake and alert      Eyes:PERRL EOMI.NO discharge or conjunctival injection    ENMT:No sinus tenderness.No thrush.No pharyngeal exudate or erythema.Fair dental hygiene    Neck:Supple,No LN,no JVD      Respiratory:Good air entry bilaterally,CTA    Cardiovascular:S1 S2     Gastrointestinal:Soft BS(+) no tenderness     Extremities:No cyanosis,clubbing or edema.    Vascular:peripheral pulses felt    Neurological:AAO X 3,No grossly focal deficits    Skin:No rash     Lymph Nodes:No palpable LNs    Musculoskeletal:No joint swelling or LOM    Psychiatric:Affect normal.                     13.5   5.67  )-----------( 121      ( 2021 07:08 )             40.9     LIVER FUNCTIONS - ( 2021 07:08 )  Alb: 4.2 g/dL / Pro: 7.6 g/dL / ALK PHOS: 50 U/L / ALT: 19 U/L / AST: 25 U/L / GGT: x             07-12    131<L>  |  92<L>  |  13  ----------------------------<  246<H>  3.1<L>   |  25  |  0.78    Ca    9.2      2021 07:08  Phos  2.9     07-12  Mg     1.7     07-12    TPro  7.6  /  Alb  4.2  /  TBili  0.9  /  DBili  x   /  AST  25  /  ALT  19  /  AlkPhos  50  07-12          Urinalysis Basic - ( 2021 12:49 )    Color: Yellow / Appearance: Clear / S.019 / pH: x  Gluc: x / Ketone: Negative  / Bili: Negative / Urobili: Negative   Blood: x / Protein: 30 mg/dL / Nitrite: Negative   Leuk Esterase: Negative / RBC: 8 /hpf / WBC 1 /HPF   Sq Epi: x / Non Sq Epi: 1 /hpf / Bacteria: Negative        RECENT CULTURES:      MICROBIOLOGY:    HIV-1/2 Antigen/Antibody Screen by [facility] (21 @ 08:26)    HIV-1/2 Combo Result: Nonreact: The HIV Ag/Ab Combo test performed screens for HIV-1 p24 antigen,  antibodies to HIV-1 (group M and group O), and antibodies to HIV-2. All  specimens repeatedly reactive will reflex to an HIV 1/2 antibody  confirmation and differentiation test. This assay detects p24 antigen  which may be present prior to the development of HIV antibodies,  therefore a reactive result with a negative HIV 1/2 AB Confirmation  should be followed up with HIV-1 RNA, HIV-2 RNA and repeat testing in 4-8  weeks. A nonreactive result does not preclude previous exposure to or  infection with HIV-1 or HIV-2.      COVID-19 Enmanuel Domain Antibody (21 @ 08:26)    COVID-19 Enmanuel Domain Antibody Result: 175.00: Roche ECLIA Total AB (MARIA LUISA)  NOTE: This result index represents a total antibody measurement, which  includes IgG, IgA and IgM.  Measures Receptor Binding Domain of the Enmanuel Protein  Negative <= 0.79 U/mL  Positive  >= 0.80 U/mL U/mL    COVID-19 Enmanuel Domain AB Interp: Positive: This test has been authorized for emergency use by the FDA. [employer] has validated this test to be accurate.  Results from antibody testing should not be used to inform infection  status.  A positive result is consistent with vaccination, prior infection, or  rarely be due to past or present infection with non-SARS-CoV-2  coronavirus strains, such as  coronavirus HKU1,  NL63, OC43, A3 or 229E.  A negative result does not rule out SARS-CoV-2 infection, particularly in  those who have been in recent contact with the virus.    Respiratory Viral Panel with COVID-19 by ANEL (21 @ 19:21)    [facility]: [facility]    SARS-CoV-2: NotDete: This Respiratory Panel uses polymerase chain reaction (PCR) to detect for  adenovirus; coronavirus (HKU1, NL63, 229E, OC43); human metapneumovirus  (hMPV); human enterovirus/rhinovirus (Entero/RV); influenza A; influenza  A/H1; influenza A/H3; influenza A/H1-2009; influenza B; parainfluenza  viruses 1, 2, 3, 4; respiratory syncytial virus; Mycoplasma pneumoniae;  Chlamydophila pneumoniae; and SARS-CoV-2.      Culture - Blood (21 @ 13:13)    Specimen Source: .Blood Blood-Peripheral    Culture Results:   No growth to date.    Culture - Blood (21 @ 13:13)    Specimen Source: .Blood Blood-Peripheral    Culture Results:   No growth to date.      Radiology:  < from: CT Chest No Cont (21 @ 12:45) >  EXAM:  CT CHEST                            PROCEDURE DATE:  2021            INTERPRETATION:  Clinical information: Pericardial abnormality.    CT scan of the chest was obtained without administration of intravenous contrast.    Multiple lymphnodes are present in the supraclavicular region bilaterally.    Multiple lymph nodes are present in the anterior mediastinum, left internal mammary chain, pretracheal space, AP window and in the right cardiophrenic angle. One of the largest lymph node is noted in the anterior mediastinum and measures 2 x 2 centimeters. Heart is normal in size. Calcification of the coronary arteries is noted. Very small pericardial effusion is noted.    No endobronchial lesions are noted. Lungs are clear. No pleural effusions are noted.    For interpretation of the abdominal contents, please see the report of the CT scan of the abdomen performed on 2021.    Degenerative changes of the spine are noted. 2021    IMPRESSION: Multiple lymph nodes are present in the mediastinum and supraclavicular region bilaterally as described above. Exact etiology is unclear. One of the differential diagnostic consideration includes lymphoma.    Very small pericardial effusion.    --- End of Report ---        ROEL VARGAS MD; Attending Radiologist  This document has been electronically signed. 2021  1:31PM    < end of copied text >      < from: US Pelvis Complete (21 @ 18:59) >  EXAM:  US TRANSVAGINAL                          EXAM:  US PELVIC COMPLETE                            PROCEDURE DATE:  2021            INTERPRETATION:  CLINICAL INFORMATION: Status post dilation and curettage for uterine polyp on 2021. Left adnexa not well evaluated on CT, performed for better visualization.    LMP: Approximately 5 years ago.    COMPARISON: CT abdomen pelvis 2021    TECHNIQUE:  Endovaginal and transabdominal pelvic sonogram. Color and Spectral Doppler was performed.    FINDINGS:    Uterus: 10.1 cm x 6.9 cm x 6.4 cm. Right leiomyoma, measuring 4.0 x 4.6 x 3.9 cm. Fundal leiomyoma measuring 5.2 x 4.9 x 4.9 cm. There is a lower uterine segment fibroid measuring 4.0 x 4.6 x 3.9 cm.    Endometrium: 0.3 cm. Containsfluid and echogenic material, likely representing clot.    Right ovary: 2.4 cm x 1.7 cm x 1.3 cm. Within normal limits. Normal arterial and venous waveforms.  Left ovary: 1.8 cm x 2.5 cm x 3.0 cm. Within normal limits. Normal arterial and venous waveforms.    Fluid: None.    Other: Multiple collateral vessels in the pelvis.    IMPRESSION:  Echogenic material within the endometrium, likely representing clot in the context of recent D&C.    --- End of Report ---      DUNIA DE LEON MD; Resident Radiology  This document has been electronically signed.  NATHAN FERREIRA MD; Attending Radiologist  This document has been electronically signed. 2021  7:36PM    < end of copied text >    < from: CT Abdomen and Pelvis w/ IV Cont (21 @ 14:48) >    EXAM:  CT ABDOMEN AND PELVIS IC                            PROCEDURE DATE:  2021            INTERPRETATION:  CLINICAL INFORMATION: Postoperative fever status post dilation and curettage 2 days ago for endometrial polyp. Abdominal pain.    COMPARISON: None.    CONTRAST/COMPLICATIONS:  IV Contrast: Omnipaque 350  90 mL administered   10 mL discarded  Oral Contrast: None  Complications: None    PROCEDURE:  CT of the Abdomen and Pelvis was performed.  Sagittal and coronal reformats were performed.    FINDINGS:  LOWER CHEST: Trace pericardial effusion. Aortic valve and mitral annular calcifications. A partially imaged fat density right pericardial structure measuring 5.1 x 3.3 cm (2.5) inclusive of solid nodules/possible lymph nodes, of uncertain etiology. Mildly enlarged right cardiophrenic lymph nodes with a reference measuring 1.5 x 1.4 cm (2:13).    LIVER: Within normal limits.  BILE DUCTS: Normal caliber.  GALLBLADDER: Within normal limits.  SPLEEN: Splenomegaly measuring 17.7 cm.  PANCREAS: Within normal limits.  ADRENALS: Within normal limits.  KIDNEYS/URETERS: A right interpole cyst and a nonobstructing 4 mm right interpole stone. No hydronephrosis.    BLADDER: Within normal limits.  REPRODUCTIVE ORGANS: Fibroid uterus. Left adnexa is prominent and inseparable from multiple collateral vessels. No right adnexal mass.    BOWEL: Focal thickening of the cecum and proximal ascending colon with mild pericolic inflammatory change. No bowel obstruction. Appendix is normal. Status post sleeve gastrectomy.  PERITONEUM: No ascites. Multiple subcentimeter in short axis mesenteric nodes, nonspecific.  VESSELS: Atherosclerotic changes. Patent portal veins. Prominent collateral vessels extending from the left adnexa to the portal venous system. Mildly dilated left gonadal vein to 0.8 cm.  RETROPERITONEUM/LYMPH NODES: Numerous mildly enlarged lymph nodes including cardiophrenic, retrocrural, periportal, retroperitoneal, and mesenteric nodes, of uncertain etiology. A reference lymph node just superior to the left renal vein measures 3 x 1.6 cm (2:41).  ABDOMINAL WALL: Within normal limits.  BONES: Degenerative changes.    IMPRESSION:    Mild wall thickening of the cecum and proximal ascending colon with pericolic inflammatory change, suggesting colitis. Differential includes infectious, inflammatory and ischemic etiology. Given focal nature, if not recently performed, recommend colonoscopy after resolution of symptomatology to exclude underlying lesion.    Left adnexa isprominent and inseparable from multiple collateral vessels. Consider more definitive characterization with pelvic ultrasound.    Splenomegaly and extensive lymphadenopathy of uncertain etiology. Differential includes inflammatory and neoplastic etiologies. Correlate clinically.    A partially imaged fat density right pericardial structure is indeterminate. Consider CT chest for further characterization.    --- End of Report ---        ANDREW ANN MD; Resident Radiology  This document has been electronically signed.  BETSY FORDE MD; Attending Radiologist  This document has been electronically signed. 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 Patient states that after the D&C on Friday she was recovering appropriately, tolerating PO, ambulating, mild abdominal cramping, no vaginal discharge or vaginal bleeding. Yesterday evening, she noted a fever of 103. She had some relief with Tylenol and cold compresses. This morning she was febrile again and presented to the ED. She reports associated nausea, urinary frequency and urgency. No vomiting or diarrhea. Also reporting diffuse fatigue, frontal pressure-like headache and neck ache, improved with Tylenol. Denies any CP, SOB, cough, focal numbness, tingling, weakness, n/v/d, sick contact.   Patient is UTD on all cancer screenings, colonoscopy 4 years ago wnl, mammograms and Pap Smears also benign.     On arrival to the ED, patient febrile to 103.2, , -167/57-81, RR 16-22 O2 93-98% on RA. CBC with no leukocytosis, platelets 143, ESR/CRP 48/38, lactate 1.8, UA clean, RVP negative, CT A/P w/ colitis of cecum and proximal ascending colon, prominent left adnexa, splenomegaly and extensive lymphadenopathy, indeterminate partially imaged fat density right pericardial structure. Transvaginal ultrasound reveals echogenic material within the endometrium. CXR unrevealing.    (2021 00:12)    Pt recalls having chick fillet for lunch on Saturday . Pt denies antibiotics prior to the procedure    PAST MEDICAL & SURGICAL HISTORY:  HTN (hypertension)    Diabetes    Depression    Hypothyroid    Asthma  well controlled    Hypokalemia    Morbidly obese    AQUILES (obstructive sleep apnea)    Hyperlipidemia    S/P tonsillectomy    S/P laparoscopic sleeve gastrectomy  2018    History of ectopic pregnancy    Had Covid vaccine         Social history:   Marital Status: single   Occupation: works for [employer]  Lives with: self    Substance Use : denes  Tobacco Usage:  (   ) never smoked   ( x  ) former smoker- briefly when she was in her early 20s   (   ) current smoker  (     ) pack year  (        ) last tobacco use date  Alcohol Usage: denies  Pets: 2 dogs          FAMILY HISTORY:  mother  lung cancer  father  - MVA  brother- s/p liver transplant         REVIEW OF SYSTEMS  General:	fevers    Skin:No rash  	  Ophthalmologic:Denies any visual complaints,discharge redness or photophobia  	  ENMT mild sore throat     Respiratory and Thorax:No cough,sputum or chest pain.Denies shortness of breath  	  Cardiovascular:	No chest pain,palpitaions or dizziness    Gastrointestinal:	diarrhea, some cramps  no blood in stool    Genitourinary:	No dysuria,frequency. No flank pain no vaginal discharge    Musculoskeletal:	No joint swelling or pain.No weakness    Neurological:No confusion,diziness.No extremity weakness.No bladder or bowel incontinence	    Psychiatric:No delusions or hallucinations	    Hematology/Lymphatics:	No LN swelling.No gum bleeding     Endocrine:	No recent weight gain or loss.No abnormal heat/cold intolerance    Allergic/Immunologic:	No hives or rash     Allergies    No Known Allergies    Intolerances    amoxicillin (Nausea)  Avelox (Other)      Antimicrobials:       MEDICATIONS  (prior antimicrobials ):    cefTRIAXone   IVPB   100 mL/Hr IV Intermittent (21 @ 13:35)    metroNIDAZOLE  IVPB   100 mL/Hr IV Intermittent (21 @ 09:42)   100 mL/Hr IV Intermittent (21 @ 00:16)    piperacillin/tazobactam IVPB.   200 mL/Hr IV Intermittent (21 @ 22:24)    piperacillin/tazobactam IVPB..   25 mL/Hr IV Intermittent (21 @ 05:42)             metroNIDAZOLE  IVPB 500 milliGRAM(s) IV Intermittent every 8 hours  piperacillin/tazobactam IVPB.. 3.375 Gram(s) IV Intermittent every 8 hours      MEDICATIONS  (STANDING):  amLODIPine   Tablet 10 milliGRAM(s) Oral daily  aspirin  chewable 81 milliGRAM(s) Oral daily  atorvastatin 20 milliGRAM(s) Oral at bedtime  dextrose 40% Gel 15 Gram(s) Oral once  dextrose 5%. 1000 milliLiter(s) (50 mL/Hr) IV Continuous <Continuous>  dextrose 5%. 1000 milliLiter(s) (100 mL/Hr) IV Continuous <Continuous>  dextrose 50% Injectable 25 Gram(s) IV Push once  dextrose 50% Injectable 12.5 Gram(s) IV Push once  dextrose 50% Injectable 25 Gram(s) IV Push once  enoxaparin Injectable 40 milliGRAM(s) SubCutaneous daily  FLUoxetine 10 milliGRAM(s) Oral daily  glucagon  Injectable 1 milliGRAM(s) IntraMuscular once  glycopyrrolate 9 MICROgram(s)/formoterol 4.8 MICROgram(s) Inhaler 2 Puff(s) Inhalation two times a day  hydrochlorothiazide 12.5 milliGRAM(s) Oral daily  insulin lispro (ADMELOG) corrective regimen sliding scale   SubCutaneous three times a day before meals  insulin lispro (ADMELOG) corrective regimen sliding scale   SubCutaneous at bedtime  levothyroxine 50 MICROGram(s) Oral daily  losartan 100 milliGRAM(s) Oral daily  metroNIDAZOLE  IVPB 500 milliGRAM(s) IV Intermittent every 8 hours  montelukast 10 milliGRAM(s) Oral daily  piperacillin/tazobactam IVPB.. 3.375 Gram(s) IV Intermittent every 8 hours    MEDICATIONS  (PRN):  acetaminophen   Tablet .. 650 milliGRAM(s) Oral every 6 hours PRN Mild Pain (1 - 3)        Vital Signs Last 24 Hrs  T(C): 37.3 (2021 08:26), Max: 39.6 (2021 22:27)  T(F): 99.1 (2021 08:26), Max: 103.2 (2021 22:27)  HR: 77 (2021 08:26) (77 - 99)  BP: 146/81 (2021 08:26) (111/57 - 167/81)  BP(mean): --  RR: 18 (2021 08:26) (18 - 20)  SpO2: 95% (2021 08:26) (93% - 98%)    PHYSICAL EXAM:Pleasant patient in no acute distress. face flushed       Constitutional:Comfortable.Awake and alert      Eyes:PERRL EOMI.NO discharge or conjunctival injection    ENMT:No sinus tenderness.No thrush.No pharyngeal exudate or erythema.Fair dental hygiene    Neck:Supple,No LN,no JVD      Respiratory:Good air entry bilaterally,CTA    Cardiovascular:S1 S2     Gastrointestinal:Soft BS(+) no tenderness     Extremities:No cyanosis,clubbing or edema.    Vascular:peripheral pulses felt    Neurological:AAO X 3,No grossly focal deficits    Skin:No rash     Lymph Nodes:No palpable LNs    Musculoskeletal:No joint swelling or LOM    Psychiatric:Affect normal.                     13.5   5.67  )-----------( 121      ( 2021 07:08 )             40.9     LIVER FUNCTIONS - ( 2021 07:08 )  Alb: 4.2 g/dL / Pro: 7.6 g/dL / ALK PHOS: 50 U/L / ALT: 19 U/L / AST: 25 U/L / GGT: x             07-12    131<L>  |  92<L>  |  13  ----------------------------<  246<H>  3.1<L>   |  25  |  0.78    Ca    9.2      2021 07:08  Phos  2.9     07-12  Mg     1.7     07-12    TPro  7.6  /  Alb  4.2  /  TBili  0.9  /  DBili  x   /  AST  25  /  ALT  19  /  AlkPhos  50  07-12          Urinalysis Basic - ( 2021 12:49 )    Color: Yellow / Appearance: Clear / S.019 / pH: x  Gluc: x / Ketone: Negative  / Bili: Negative / Urobili: Negative   Blood: x / Protein: 30 mg/dL / Nitrite: Negative   Leuk Esterase: Negative / RBC: 8 /hpf / WBC 1 /HPF   Sq Epi: x / Non Sq Epi: 1 /hpf / Bacteria: Negative        RECENT CULTURES:      MICROBIOLOGY:    HIV-1/2 Antigen/Antibody Screen by [facility] (21 @ 08:26)    HIV-1/2 Combo Result: Nonreact: The HIV Ag/Ab Combo test performed screens for HIV-1 p24 antigen,  antibodies to HIV-1 (group M and group O), and antibodies to HIV-2. All  specimens repeatedly reactive will reflex to an HIV 1/2 antibody  confirmation and differentiation test. This assay detects p24 antigen  which may be present prior to the development of HIV antibodies,  therefore a reactive result with a negative HIV 1/2 AB Confirmation  should be followed up with HIV-1 RNA, HIV-2 RNA and repeat testing in 4-8  weeks. A nonreactive result does not preclude previous exposure to or  infection with HIV-1 or HIV-2.      COVID-19 Enmanuel Domain Antibody (21 @ 08:26)    COVID-19 Enmanuel Domain Antibody Result: 175.00: Roche ECLIA Total AB (MARIA LUISA)  NOTE: This result index represents a total antibody measurement, which  includes IgG, IgA and IgM.  Measures Receptor Binding Domain of the Enmanuel Protein  Negative <= 0.79 U/mL  Positive  >= 0.80 U/mL U/mL    COVID-19 Enmanuel Domain AB Interp: Positive: This test has been authorized for emergency use by the FDA. [facility] [employer] has validated this test to be accurate.  Results from antibody testing should not be used to inform infection  status.  A positive result is consistent with vaccination, prior infection, or  rarely be due to past or present infection with non-SARS-CoV-2  coronavirus strains, such as  coronavirus HKU1,  NL63, OC43, A3 or 229E.  A negative result does not rule out SARS-CoV-2 infection, particularly in  those who have been in recent contact with the virus.    Respiratory Viral Panel with COVID-19 by ANEL (21 @ 19:21)    Rapid RVP Result: NotDete    SARS-CoV-2: NotDete: This Respiratory Panel uses polymerase chain reaction (PCR) to detect for  adenovirus; coronavirus (HKU1, NL63, 229E, OC43); human metapneumovirus  (hMPV); human enterovirus/rhinovirus (Entero/RV); influenza A; influenza  A/H1; influenza A/H3; influenza A/H1-2009; influenza B; parainfluenza  viruses 1, 2, 3, 4; respiratory syncytial virus; Mycoplasma pneumoniae;  Chlamydophila pneumoniae; and SARS-CoV-2.      Culture - Blood (21 @ 13:13)    Specimen Source: .Blood Blood-Peripheral    Culture Results:   No growth to date.    Culture - Blood (21 @ 13:13)    Specimen Source: .Blood Blood-Peripheral    Culture Results:   No growth to date.      Radiology:  < from: CT Chest No Cont (21 @ 12:45) >  EXAM:  CT CHEST                            PROCEDURE DATE:  2021            INTERPRETATION:  Clinical information: Pericardial abnormality.    CT scan of the chest was obtained without administration of intravenous contrast.    Multiple lymphnodes are present in the supraclavicular region bilaterally.    Multiple lymph nodes are present in the anterior mediastinum, left internal mammary chain, pretracheal space, AP window and in the right cardiophrenic angle. One of the largest lymph node is noted in the anterior mediastinum and measures 2 x 2 centimeters. Heart is normal in size. Calcification of the coronary arteries is noted. Very small pericardial effusion is noted.    No endobronchial lesions are noted. Lungs are clear. No pleural effusions are noted.    For interpretation of the abdominal contents, please see the report of the CT scan of the abdomen performed on 2021.    Degenerative changes of the spine are noted. 2021    IMPRESSION: Multiple lymph nodes are present in the mediastinum and supraclavicular region bilaterally as described above. Exact etiology is unclear. One of the differential diagnostic consideration includes lymphoma.    Very small pericardial effusion.    --- End of Report ---        ROEL VARGAS MD; Attending Radiologist  This document has been electronically signed. 2021  1:31PM    < end of copied text >      < from: US Pelvis Complete (21 @ 18:59) >  EXAM:  US TRANSVAGINAL                          EXAM:  US PELVIC COMPLETE                            PROCEDURE DATE:  2021            INTERPRETATION:  CLINICAL INFORMATION: Status post dilation and curettage for uterine polyp on 2021. Left adnexa not well evaluated on CT, performed for better visualization.    LMP: Approximately 5 years ago.    COMPARISON: CT abdomen pelvis 2021    TECHNIQUE:  Endovaginal and transabdominal pelvic sonogram. Color and Spectral Doppler was performed.    FINDINGS:    Uterus: 10.1 cm x 6.9 cm x 6.4 cm. Right leiomyoma, measuring 4.0 x 4.6 x 3.9 cm. Fundal leiomyoma measuring 5.2 x 4.9 x 4.9 cm. There is a lower uterine segment fibroid measuring 4.0 x 4.6 x 3.9 cm.    Endometrium: 0.3 cm. Containsfluid and echogenic material, likely representing clot.    Right ovary: 2.4 cm x 1.7 cm x 1.3 cm. Within normal limits. Normal arterial and venous waveforms.  Left ovary: 1.8 cm x 2.5 cm x 3.0 cm. Within normal limits. Normal arterial and venous waveforms.    Fluid: None.    Other: Multiple collateral vessels in the pelvis.    IMPRESSION:  Echogenic material within the endometrium, likely representing clot in the context of recent D&C.    --- End of Report ---      DUNIA DE LEON MD; Resident Radiology  This document has been electronically signed.  NATHAN FERREIRA MD; Attending Radiologist  This document has been electronically signed. 2021  7:36PM    < end of copied text >    < from: CT Abdomen and Pelvis w/ IV Cont (21 @ 14:48) >    EXAM:  CT ABDOMEN AND PELVIS IC                            PROCEDURE DATE:  2021            INTERPRETATION:  CLINICAL INFORMATION: Postoperative fever status post dilation and curettage 2 days ago for endometrial polyp. Abdominal pain.    COMPARISON: None.    CONTRAST/COMPLICATIONS:  IV Contrast: Omnipaque 350  90 mL administered   10 mL discarded  Oral Contrast: None  Complications: None    PROCEDURE:  CT of the Abdomen and Pelvis was performed.  Sagittal and coronal reformats were performed.    FINDINGS:  LOWER CHEST: Trace pericardial effusion. Aortic valve and mitral annular calcifications. A partially imaged fat density right pericardial structure measuring 5.1 x 3.3 cm (2.5) inclusive of solid nodules/possible lymph nodes, of uncertain etiology. Mildly enlarged right cardiophrenic lymph nodes with a reference measuring 1.5 x 1.4 cm (2:13).    LIVER: Within normal limits.  BILE DUCTS: Normal caliber.  GALLBLADDER: Within normal limits.  SPLEEN: Splenomegaly measuring 17.7 cm.  PANCREAS: Within normal limits.  ADRENALS: Within normal limits.  KIDNEYS/URETERS: A right interpole cyst and a nonobstructing 4 mm right interpole stone. No hydronephrosis.    BLADDER: Within normal limits.  REPRODUCTIVE ORGANS: Fibroid uterus. Left adnexa is prominent and inseparable from multiple collateral vessels. No right adnexal mass.    BOWEL: Focal thickening of the cecum and proximal ascending colon with mild pericolic inflammatory change. No bowel obstruction. Appendix is normal. Status post sleeve gastrectomy.  PERITONEUM: No ascites. Multiple subcentimeter in short axis mesenteric nodes, nonspecific.  VESSELS: Atherosclerotic changes. Patent portal veins. Prominent collateral vessels extending from the left adnexa to the portal venous system. Mildly dilated left gonadal vein to 0.8 cm.  RETROPERITONEUM/LYMPH NODES: Numerous mildly enlarged lymph nodes including cardiophrenic, retrocrural, periportal, retroperitoneal, and mesenteric nodes, of uncertain etiology. A reference lymph node just superior to the left renal vein measures 3 x 1.6 cm (2:41).  ABDOMINAL WALL: Within normal limits.  BONES: Degenerative changes.    IMPRESSION:    Mild wall thickening of the cecum and proximal ascending colon with pericolic inflammatory change, suggesting colitis. Differential includes infectious, inflammatory and ischemic etiology. Given focal nature, if not recently performed, recommend colonoscopy after resolution of symptomatology to exclude underlying lesion.    Left adnexa isprominent and inseparable from multiple collateral vessels. Consider more definitive characterization with pelvic ultrasound.    Splenomegaly and extensive lymphadenopathy of uncertain etiology. Differential includes inflammatory and neoplastic etiologies. Correlate clinically.    A partially imaged fat density right pericardial structure is indeterminate. Consider CT chest for further characterization.    --- End of Report ---        ANDREW ANN MD; Resident Radiology  This document has been electronically signed.  BETSY FORDE MD; Attending Radiologist  This document has been electronically signed. 2021  4:15PM    < end of copied text >

## 2021-07-12 NOTE — PROGRESS NOTE ADULT - ATTENDING COMMENTS
Pt seen chart rev. As per above note. Agree with management and plan.  Ct scan shows evidence of colitis and lymphadenopathy.  Pt is three days S/P D and C Hysteroscopy. Ct Does not show any evidence of perforation.  Id to see pt . Pt is being covered for possible endometritis with IV antibiotics.  Follow labs and vitals closely.  GI and ID recs.  Consider Surg onc eval secondary to lymphadeopathy.

## 2021-07-12 NOTE — CONSULT NOTE ADULT - SUBJECTIVE AND OBJECTIVE BOX
Interventional Radiology    Evaluate for Procedure: Lymph node biopsy, mediastinal or supraclavicular    HPI: 55y Female p/w fevers, resolving sepsis from questionable colitis/endometritis. Found to have lymphadenopathy on CT chest, abdomen, and pelvis. IR consulted for lymph node biopsy.     Allergies:   Medications (Abx/Cardiac/Anticoagulation/Blood Products)  amLODIPine   Tablet: 10 milliGRAM(s) Oral (07-12 @ 05:41)  aspirin  chewable: 81 milliGRAM(s) Oral (07-12 @ 12:23)  cefTRIAXone   IVPB: 100 mL/Hr IV Intermittent (07-11 @ 13:35)  enoxaparin Injectable: 40 milliGRAM(s) SubCutaneous (07-12 @ 12:25)  hydrochlorothiazide: 12.5 milliGRAM(s) Oral (07-12 @ 05:41)  losartan: 100 milliGRAM(s) Oral (07-12 @ 05:41)  metroNIDAZOLE  IVPB: 100 mL/Hr IV Intermittent (07-12 @ 09:42)  piperacillin/tazobactam IVPB.: 200 mL/Hr IV Intermittent (07-11 @ 22:24)  piperacillin/tazobactam IVPB..: 25 mL/Hr IV Intermittent (07-12 @ 13:49)    Data:    T(C): 37.8  HR: 93  BP: 138/70  RR: 18  SpO2: 96%    -WBC 5.67 / HgB 13.5 / Hct 40.9 / Plt 121  -Na 131 / Cl 92 / BUN 13 / Glucose 246  -K 3.1 / CO2 25 / Cr 0.78  -ALT 19 / Alk Phos 50 / T.Bili 0.9      Radiology:   CT Chest 7/12/21   IMPRESSION: Multiple lymph nodes are present in the mediastinum and supraclavicular region bilaterally as described above. Exact etiology is unclear. One of the differential diagnostic consideration includes lymphoma.    Very small pericardial effusion.    CT Abdomen/Pelvis 7/11/2021     IMPRESSION:    Mild wall thickening of the cecum and proximal ascending colon with pericolic inflammatory change, suggesting colitis. Differential includes infectious, inflammatory and ischemic etiology. Given focal nature, if not recently performed, recommend colonoscopy after resolution of symptomatology to exclude underlying lesion.    Left adnexa isprominent and inseparable from multiple collateral vessels. Consider more definitive characterization with pelvic ultrasound.    Splenomegaly and extensive lymphadenopathy of uncertain etiology. Differential includes inflammatory and neoplastic etiologies. Correlate clinically.    A partially imaged fat density right pericardial structure is indeterminate. Consider CT chest for further characterization.          Assessment/Plan:     -- IR will plan to perform   -- NPO at midnight on   -- hold a.m. anticoagulation on  -- please complete IR pre-procedure note  -- please place IR procedure request order under       Interventional Radiology    Evaluate for Procedure: Lymph node biopsy, mediastinal or supraclavicular    HPI: 55y Female p/w fevers, resolving sepsis from questionable colitis/endometritis. Found to have lymphadenopathy on CT chest, abdomen, and pelvis. IR consulted for lymph node biopsy.     Allergies:   Medications (Abx/Cardiac/Anticoagulation/Blood Products)  amLODIPine   Tablet: 10 milliGRAM(s) Oral (07-12 @ 05:41)  aspirin  chewable: 81 milliGRAM(s) Oral (07-12 @ 12:23)  cefTRIAXone   IVPB: 100 mL/Hr IV Intermittent (07-11 @ 13:35)  enoxaparin Injectable: 40 milliGRAM(s) SubCutaneous (07-12 @ 12:25)  hydrochlorothiazide: 12.5 milliGRAM(s) Oral (07-12 @ 05:41)  losartan: 100 milliGRAM(s) Oral (07-12 @ 05:41)  metroNIDAZOLE  IVPB: 100 mL/Hr IV Intermittent (07-12 @ 09:42)  piperacillin/tazobactam IVPB.: 200 mL/Hr IV Intermittent (07-11 @ 22:24)  piperacillin/tazobactam IVPB..: 25 mL/Hr IV Intermittent (07-12 @ 13:49)    Data:    T(C): 37.8  HR: 93  BP: 138/70  RR: 18  SpO2: 96%    -WBC 5.67 / HgB 13.5 / Hct 40.9 / Plt 121  -Na 131 / Cl 92 / BUN 13 / Glucose 246  -K 3.1 / CO2 25 / Cr 0.78  -ALT 19 / Alk Phos 50 / T.Bili 0.9      Radiology:   CT Chest 7/12/21   IMPRESSION: Multiple lymph nodes are present in the mediastinum and supraclavicular region bilaterally as described above. Exact etiology is unclear. One of the differential diagnostic consideration includes lymphoma.    Very small pericardial effusion.    CT Abdomen/Pelvis 7/11/2021     IMPRESSION:    Mild wall thickening of the cecum and proximal ascending colon with pericolic inflammatory change, suggesting colitis. Differential includes infectious, inflammatory and ischemic etiology. Given focal nature, if not recently performed, recommend colonoscopy after resolution of symptomatology to exclude underlying lesion.    Left adnexa isprominent and inseparable from multiple collateral vessels. Consider more definitive characterization with pelvic ultrasound.    Splenomegaly and extensive lymphadenopathy of uncertain etiology. Differential includes inflammatory and neoplastic etiologies. Correlate clinically.    A partially imaged fat density right pericardial structure is indeterminate. Consider CT chest for further characterization.      Assessment/Plan:     -- Please obtain focused ultrasound of left supraclavicular region to assess for visualization of lymph node for potential ultrasound guided biopsy.   -- Will need to be off anticoagulation for 5 days prior to potential biopsy.   -- Imaging reviewed.   -- Will continue to follow.   -- Discussed with Dr. Lucas.

## 2021-07-12 NOTE — CONSULT NOTE ADULT - SUBJECTIVE AND OBJECTIVE BOX
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 Patient states that after the D&C on Friday she was recovering appropriately, tolerating PO, ambulating, mild abdominal cramping, no vaginal discharge or vaginal bleeding. Saturday evening, she noted a fever of 103. She had some relief with Tylenol and cold compresses. Yesterday morning she was febrile again and presented to the ED. She reports urinary frequency and urgency. Also reporting diffuse fatigue, frontal pressure-like headache and neck ache, improved with Tylenol. Denies any CP, SOB, cough, focal numbness, tingling, weakness, sick contact.    GI asked to consult for Colitis showed on CT  patient reports she had crampy lower abdominal pain when coming to the ER but reports no abdominal pain a this time  states she had no diarrhea at home but she has had 3-4 episodes of watery diarrhea today  denies nausea of vomiting   patient states she lives alone and has not been in contact with anyone who has similar symptoms   Patient is tolerating diet, however she is not eating as much to avoid the diarrhea   patient has colonoscopy 4 years ago, results WNL    Allergies:  amoxicillin (Nausea)  Avelox (Other)  No Known Allergies      Medications:  acetaminophen   Tablet .. 650 milliGRAM(s) Oral every 6 hours PRN  amLODIPine   Tablet 10 milliGRAM(s) Oral daily  aspirin  chewable 81 milliGRAM(s) Oral daily  atorvastatin 20 milliGRAM(s) Oral at bedtime  dextrose 40% Gel 15 Gram(s) Oral once  dextrose 5%. 1000 milliLiter(s) IV Continuous <Continuous>  dextrose 5%. 1000 milliLiter(s) IV Continuous <Continuous>  dextrose 50% Injectable 25 Gram(s) IV Push once  dextrose 50% Injectable 12.5 Gram(s) IV Push once  dextrose 50% Injectable 25 Gram(s) IV Push once  enoxaparin Injectable 40 milliGRAM(s) SubCutaneous daily  FLUoxetine 10 milliGRAM(s) Oral daily  glucagon  Injectable 1 milliGRAM(s) IntraMuscular once  glycopyrrolate 9 MICROgram(s)/formoterol 4.8 MICROgram(s) Inhaler 2 Puff(s) Inhalation two times a day  hydrochlorothiazide 12.5 milliGRAM(s) Oral daily  insulin lispro (ADMELOG) corrective regimen sliding scale   SubCutaneous three times a day before meals  insulin lispro (ADMELOG) corrective regimen sliding scale   SubCutaneous at bedtime  levothyroxine 50 MICROGram(s) Oral daily  losartan 100 milliGRAM(s) Oral daily  metroNIDAZOLE  IVPB 500 milliGRAM(s) IV Intermittent every 8 hours  montelukast 10 milliGRAM(s) Oral daily  piperacillin/tazobactam IVPB.. 3.375 Gram(s) IV Intermittent every 8 hours      PMHX/PSHX:  HTN (hypertension)    Diabetes    Depression    Hypothyroid    Asthma    Hypokalemia    Morbidly obese    AQUILES (obstructive sleep apnea)    Hyperlipidemia    S/P tonsillectomy    S/P laparoscopic sleeve gastrectomy    History of ectopic pregnancy        Family history:  No pertinent family history in first degree relatives        Social History:     ROS:     General:  No wt loss, fevers, chills, night sweats, fatigue,   Eyes:  Good vision, no reported pain  ENT:  No sore throat, pain, runny nose, dysphagia  CV:  No pain, palpitations, hypo/hypertension  Resp:  No dyspnea, cough, tachypnea, wheezing  GI:  No pain, No nausea, No vomiting, + diarrhea, No constipation, No weight loss, No fever, No pruritis, No rectal bleeding, No tarry stools, No dysphagia,  :  No pain, bleeding, incontinence, nocturia  Muscle:  No pain, weakness  Neuro:  No weakness, tingling, memory problems  Psych:  No fatigue, insomnia, mood problems, depression  Endocrine:  No polyuria, polydipsia, cold/heat intolerance  Heme:  No petechiae, ecchymosis, easy bruisability  Skin:  No rash, tattoos, scars, edema      PHYSICAL EXAM:   Vital Signs:  Vital Signs Last 24 Hrs  T(C): 37.3 (2021 08:26), Max: 39.6 (2021 22:27)  T(F): 99.1 (2021 08:26), Max: 103.2 (2021 22:27)  HR: 77 (2021 08:26) (77 - 99)  BP: 146/81 (2021 08:26) (111/57 - 167/81)  BP(mean): --  RR: 18 (2021 08:26) (18 - 20)  SpO2: 95% (2021 08:26) (93% - 98%)  Daily     Daily     GENERAL:  Appears stated age,   HEENT:  NC/AT,    CHEST:  Full & symmetric excursion,   HEART:  Regular rhythm  ABDOMEN:  Soft, non-tender, non-distended,   EXTEREMITIES:  no cyanosis,clubbing or edema  SKIN:  No rash  NEURO:  Alert,    LABS:                        13.5   5.67  )-----------( 121      ( 2021 07:08 )             40.9     07-12    131<L>  |  92<L>  |  13  ----------------------------<  246<H>  3.1<L>   |  25  |  0.78    Ca    9.2      2021 07:08  Phos  2.9     07-12  Mg     1.7     07-12    TPro  7.6  /  Alb  4.2  /  TBili  0.9  /  DBili  x   /  AST  25  /  ALT  19  /  AlkPhos  50  07-12    LIVER FUNCTIONS - ( 2021 07:08 )  Alb: 4.2 g/dL / Pro: 7.6 g/dL / ALK PHOS: 50 U/L / ALT: 19 U/L / AST: 25 U/L / GGT: x             Urinalysis Basic - ( 2021 12:49 )    Color: Yellow / Appearance: Clear / S.019 / pH: x  Gluc: x / Ketone: Negative  / Bili: Negative / Urobili: Negative   Blood: x / Protein: 30 mg/dL / Nitrite: Negative   Leuk Esterase: Negative / RBC: 8 /hpf / WBC 1 /HPF   Sq Epi: x / Non Sq Epi: 1 /hpf / Bacteria: Negative          Imaging:      < from: CT Abdomen and Pelvis w/ IV Cont (21 @ 14:48) >    EXAM:  CT ABDOMEN AND PELVIS IC                            PROCEDURE DATE:  2021            INTERPRETATION:  CLINICAL INFORMATION: Postoperative fever status post dilation and curettage 2 days ago for endometrial polyp. Abdominal pain.    COMPARISON: None.    CONTRAST/COMPLICATIONS:  IV Contrast: Omnipaque 350  90 mL administered   10 mL discarded  Oral Contrast: None  Complications: None    PROCEDURE:  CT of the Abdomen and Pelvis was performed.  Sagittal and coronal reformats were performed.    FINDINGS:  LOWER CHEST: Trace pericardial effusion. Aortic valve and mitral annular calcifications. A partially imaged fat density right pericardial structure measuring 5.1 x 3.3 cm (2.5) inclusive of solid nodules/possible lymph nodes, of uncertain etiology. Mildly enlarged right cardiophrenic lymph nodes with a reference measuring 1.5 x 1.4 cm (2:13).    LIVER: Within normal limits.  BILE DUCTS: Normal caliber.  GALLBLADDER: Within normal limits.  SPLEEN: Splenomegaly measuring 17.7 cm.  PANCREAS: Within normal limits.  ADRENALS: Within normal limits.  KIDNEYS/URETERS: A right interpole cyst and a nonobstructing 4 mm right interpole stone. No hydronephrosis.    BLADDER: Within normal limits.  REPRODUCTIVE ORGANS: Fibroid uterus. Left adnexa is prominent and inseparable from multiple collateral vessels. No right adnexal mass.    BOWEL: Focal thickening of the cecum and proximal ascending colon with mild pericolic inflammatory change. No bowel obstruction. Appendix is normal. Status post sleeve gastrectomy.  PERITONEUM: No ascites. Multiple subcentimeter in short axis mesenteric nodes, nonspecific.  VESSELS: Atherosclerotic changes. Patent portal veins. Prominent collateral vessels extending from the left adnexa to the portal venous system. Mildly dilated left gonadal vein to 0.8 cm.  RETROPERITONEUM/LYMPH NODES: Numerous mildly enlarged lymph nodes including cardiophrenic, retrocrural, periportal, retroperitoneal, and mesenteric nodes, of uncertain etiology. A reference lymph node just superior to the left renal vein measures 3 x 1.6 cm (2:41).  ABDOMINAL WALL: Within normal limits.  BONES: Degenerative changes.    IMPRESSION:    Mild wall thickening of the cecum and proximal ascending colon with pericolic inflammatory change, suggesting colitis. Differential includes infectious, inflammatory and ischemic etiology. Given focal nature, if not recently performed, recommend colonoscopy after resolution of symptomatology to exclude underlying lesion.    Left adnexa isprominent and inseparable from multiple collateral vessels. Consider more definitive characterization with pelvic ultrasound.    Splenomegaly and extensive lymphadenopathy of uncertain etiology. Differential includes inflammatory and neoplastic etiologies. Correlate clinically.    A partially imaged fat density right pericardial structure is indeterminate. Consider CT chest for further characterization.    --- End of Report ---              ANDREW ANN MD; Resident Radiology  This document has been electronically signed.  BETSY FORDE MD; Attending Radiologist  This document has been electronically signed. 2021  4:15PM    < end of copied text >

## 2021-07-12 NOTE — CONSULT NOTE ADULT - PROBLEM SELECTOR RECOMMENDATION 9
pt with s/p recent GYN surgery  CT results noted  continue IV abx  pending results of GI PCR and stool culture   continue diet as tolerated   will follow

## 2021-07-12 NOTE — CONSULT NOTE ADULT - SUBJECTIVE AND OBJECTIVE BOX
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 Mild abdominal cramping, no vaginal discharge or vaginal bleeding. She had associated nausea, urinary frequency and urgency. Patient is UTD on all cancer screenings, colonoscopy 4 years ago wnl, mammograms and Pap Smears also benign. CT A/P w/ colitis of cecum and proximal ascending colon, prominent left adnexa, splenomegaly and extensive lymphadenopathy, indeterminate partially imaged fat density right pericardial structure. Transvaginal ultrasound reveals echogenic material within the endometrium. CXR unrevealing.       PAST MEDICAL & SURGICAL HISTORY:  HTN (hypertension)    Diabetes    Depression    Hypothyroid    Asthma  well controlled    Hypokalemia    Morbidly obese    AQUILES (obstructive sleep apnea)    Hyperlipidemia    S/P tonsillectomy    S/P laparoscopic sleeve gastrectomy  2018    History of ectopic pregnancy      Meds:  acetaminophen   Tablet .. 650 milliGRAM(s) Oral every 6 hours PRN Mild Pain (1 - 3)  amLODIPine   Tablet 10 milliGRAM(s) Oral daily  aspirin  chewable 81 milliGRAM(s) Oral daily  atorvastatin 20 milliGRAM(s) Oral at bedtime  dextrose 40% Gel 15 Gram(s) Oral once  dextrose 5%. 1000 milliLiter(s) IV Continuous <Continuous>  dextrose 5%. 1000 milliLiter(s) IV Continuous <Continuous>  dextrose 50% Injectable 25 Gram(s) IV Push once  dextrose 50% Injectable 12.5 Gram(s) IV Push once  dextrose 50% Injectable 25 Gram(s) IV Push once  enoxaparin Injectable 40 milliGRAM(s) SubCutaneous daily  FLUoxetine 10 milliGRAM(s) Oral daily  glucagon  Injectable 1 milliGRAM(s) IntraMuscular once  glycopyrrolate 9 MICROgram(s)/formoterol 4.8 MICROgram(s) Inhaler 2 Puff(s) Inhalation two times a day  hydrochlorothiazide 12.5 milliGRAM(s) Oral daily  insulin lispro (ADMELOG) corrective regimen sliding scale   SubCutaneous three times a day before meals  insulin lispro (ADMELOG) corrective regimen sliding scale   SubCutaneous at bedtime  levothyroxine 50 MICROGram(s) Oral daily  losartan 100 milliGRAM(s) Oral daily  metroNIDAZOLE  IVPB 500 milliGRAM(s) IV Intermittent every 8 hours  montelukast 10 milliGRAM(s) Oral daily  piperacillin/tazobactam IVPB.. 3.375 Gram(s) IV Intermittent every 8 hours    Labs:  CBC Full  -  ( 12 Jul 2021 07:08 )  WBC Count : 5.67 K/uL  Hemoglobin : 13.5 g/dL  Hematocrit : 40.9 %  Platelet Count - Automated : 121 K/uL  Mean Cell Volume : 88.7 fl  Mean Cell Hemoglobin : 29.3 pg  Mean Cell Hemoglobin Concentration : 33.0 gm/dL  Auto Neutrophil # : 5.03 K/uL  Auto Lymphocyte # : 0.20 K/uL  Auto Monocyte # : 0.44 K/uL  Auto Eosinophil # : 0.00 K/uL  Auto Basophil # : 0.00 K/uL  Auto Neutrophil % : 67.0 %  Auto Lymphocyte % : 3.5 %  Auto Monocyte % : 7.8 %  Auto Eosinophil % : 0.0 %  Auto Basophil % : 0.0 %    07-12    131<L>  |  92<L>  |  13  ----------------------------<  246<H>  3.1<L>   |  25  |  0.78    Ca    9.2      12 Jul 2021 07:08  Phos  2.9     07-12  Mg     1.7     07-12    TPro  7.6  /  Alb  4.2  /  TBili  0.9  /  DBili  x   /  AST  25  /  ALT  19  /  AlkPhos  50  07-12      Radiology:     < from: CT Abdomen and Pelvis w/ IV Cont (07.11.21 @ 14:48) >   A partially imaged fat density right pericardial structure measuring 5.1 x 3.3 cm (2.5) inclusive of solid nodules/possible lymph nodes, of uncertain etiology. Mildly enlarged right cardiophrenic lymph nodes with a reference measuring 1.5 x 1.4 cm (2:13).  RETROPERITONEUM/LYMPH NODES: Numerous mildly enlarged lymph nodes including cardiophrenic, retrocrural, periportal, retroperitoneal, and mesenteric nodes, of uncertain etiology. A reference lymph node just superior to the left renal vein measures 3 x 1.6 cm (2:41).  ABDOMINAL WALL: Within normal limits.  BONES: Degenerative changes.    IMPRESSION:    Mild wall thickening of the cecum and proximal ascending colon with pericolic inflammatory change, suggesting colitis. Differential includes infectious, inflammatory and ischemic etiology. Given focal nature, if not recently performed, recommend colonoscopy after resolution of symptomatology to exclude underlying lesion.    Left adnexa isprominent and inseparable from multiple collateral vessels. Consider more definitive characterization with pelvic ultrasound.      < end of copied text >          ROS:  No pain, no fever  No lumps in neck or pain  No Sob or chest pain  No palpitations   No abdominal pain. No change in bowel habit. No blood in stools  No weakness in extremities  No leg swelling      Vital Signs Last 24 Hrs  T(C): 37.3 (12 Jul 2021 08:26), Max: 39.6 (11 Jul 2021 22:27)  T(F): 99.1 (12 Jul 2021 08:26), Max: 103.2 (11 Jul 2021 22:27)  HR: 77 (12 Jul 2021 08:26) (77 - 99)  BP: 146/81 (12 Jul 2021 08:26) (111/57 - 167/81)  BP(mean): --  RR: 18 (12 Jul 2021 08:26) (16 - 22)  SpO2: 95% (12 Jul 2021 08:26) (93% - 98%)    Physical Exam:  Patient comfortable  AXOX3  Neck supple, no LN's  Chest: bilateral breath sounds, no wheeze or rales  CVS: regular heart rate without murmur  Abdomen: soft, BS+, no massess or organomegaly  CNS: no gross deficit  Ext: no edema       HPI:  54 y/o postmenopausal female with PMHx of T2DM (A1c 7.6% 2021), morbid obesity s/p vertical sleeve gastrectomy in 2018, HTN, HLD, morbid obesity, AQUILES not on CPAP, depression, mild asthma, 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 Mild abdominal cramping, no vaginal discharge or vaginal bleeding. She had associated nausea, urinary frequency and urgency. Patient is UTD on all cancer screenings, colonoscopy 4 years ago wnl, mammograms and Pap Smears also benign. CT A/P w/ colitis of cecum and proximal ascending colon, prominent left adnexa, splenomegaly and extensive lymphadenopathy, indeterminate partially imaged fat density right pericardial structure. Transvaginal ultrasound reveals echogenic material within the endometrium. CXR unrevealing.   Multiple lymphnodes are present in the supraclavicular region bilaterally.  Multiple lymph nodes are present in the anterior mediastinum, left internal mammary chain, pretracheal space, AP window and in the right cardiophrenic angle. One of the largest lymph node is noted in the anterior mediastinum and measures 2 x 2 centimeters. Heart is normal in size. Calcification of the coronary arteries is noted. Very small pericardial effusion is noted.          PAST MEDICAL & SURGICAL HISTORY:  HTN (hypertension)    Diabetes    Depression    Hypothyroid    Asthma  well controlled    Hypokalemia    Morbidly obese    AQUILES (obstructive sleep apnea)    Hyperlipidemia    S/P tonsillectomy    S/P laparoscopic sleeve gastrectomy  2018    History of ectopic pregnancy      Meds:  acetaminophen   Tablet .. 650 milliGRAM(s) Oral every 6 hours PRN Mild Pain (1 - 3)  amLODIPine   Tablet 10 milliGRAM(s) Oral daily  aspirin  chewable 81 milliGRAM(s) Oral daily  atorvastatin 20 milliGRAM(s) Oral at bedtime  dextrose 40% Gel 15 Gram(s) Oral once  dextrose 5%. 1000 milliLiter(s) IV Continuous <Continuous>  dextrose 5%. 1000 milliLiter(s) IV Continuous <Continuous>  dextrose 50% Injectable 25 Gram(s) IV Push once  dextrose 50% Injectable 12.5 Gram(s) IV Push once  dextrose 50% Injectable 25 Gram(s) IV Push once  enoxaparin Injectable 40 milliGRAM(s) SubCutaneous daily  FLUoxetine 10 milliGRAM(s) Oral daily  glucagon  Injectable 1 milliGRAM(s) IntraMuscular once  glycopyrrolate 9 MICROgram(s)/formoterol 4.8 MICROgram(s) Inhaler 2 Puff(s) Inhalation two times a day  hydrochlorothiazide 12.5 milliGRAM(s) Oral daily  insulin lispro (ADMELOG) corrective regimen sliding scale   SubCutaneous three times a day before meals  insulin lispro (ADMELOG) corrective regimen sliding scale   SubCutaneous at bedtime  levothyroxine 50 MICROGram(s) Oral daily  losartan 100 milliGRAM(s) Oral daily  metroNIDAZOLE  IVPB 500 milliGRAM(s) IV Intermittent every 8 hours  montelukast 10 milliGRAM(s) Oral daily  piperacillin/tazobactam IVPB.. 3.375 Gram(s) IV Intermittent every 8 hours    Labs:  CBC Full  -  ( 12 Jul 2021 07:08 )  WBC Count : 5.67 K/uL  Hemoglobin : 13.5 g/dL  Hematocrit : 40.9 %  Platelet Count - Automated : 121 K/uL  Mean Cell Volume : 88.7 fl  Mean Cell Hemoglobin : 29.3 pg  Mean Cell Hemoglobin Concentration : 33.0 gm/dL  Auto Neutrophil # : 5.03 K/uL  Auto Lymphocyte # : 0.20 K/uL  Auto Monocyte # : 0.44 K/uL  Auto Eosinophil # : 0.00 K/uL  Auto Basophil # : 0.00 K/uL  Auto Neutrophil % : 67.0 %  Auto Lymphocyte % : 3.5 %  Auto Monocyte % : 7.8 %  Auto Eosinophil % : 0.0 %  Auto Basophil % : 0.0 %    07-12    131<L>  |  92<L>  |  13  ----------------------------<  246<H>  3.1<L>   |  25  |  0.78    Ca    9.2      12 Jul 2021 07:08  Phos  2.9     07-12  Mg     1.7     07-12    TPro  7.6  /  Alb  4.2  /  TBili  0.9  /  DBili  x   /  AST  25  /  ALT  19  /  AlkPhos  50  07-12      Radiology:     < from: CT Abdomen and Pelvis w/ IV Cont (07.11.21 @ 14:48) >   A partially imaged fat density right pericardial structure measuring 5.1 x 3.3 cm (2.5) inclusive of solid nodules/possible lymph nodes, of uncertain etiology. Mildly enlarged right cardiophrenic lymph nodes with a reference measuring 1.5 x 1.4 cm (2:13).  RETROPERITONEUM/LYMPH NODES: Numerous mildly enlarged lymph nodes including cardiophrenic, retrocrural, periportal, retroperitoneal, and mesenteric nodes, of uncertain etiology. A reference lymph node just superior to the left renal vein measures 3 x 1.6 cm (2:41).  ABDOMINAL WALL: Within normal limits.  BONES: Degenerative changes.    IMPRESSION:    Mild wall thickening of the cecum and proximal ascending colon with pericolic inflammatory change, suggesting colitis. Differential includes infectious, inflammatory and ischemic etiology. Given focal nature, if not recently performed, recommend colonoscopy after resolution of symptomatology to exclude underlying lesion.    Left adnexa isprominent and inseparable from multiple collateral vessels. Consider more definitive characterization with pelvic ultrasound.      < end of copied text >          ROS:  No pain, fever decreasing  No lumps in neck or pain  No Sob or chest pain  No palpitations   No abdominal pain. Diarrhea + No blood in stools  No weakness in extremities  No leg swelling      Vital Signs Last 24 Hrs  T(C): 37.3 (12 Jul 2021 08:26), Max: 39.6 (11 Jul 2021 22:27)  T(F): 99.1 (12 Jul 2021 08:26), Max: 103.2 (11 Jul 2021 22:27)  HR: 77 (12 Jul 2021 08:26) (77 - 99)  BP: 146/81 (12 Jul 2021 08:26) (111/57 - 167/81)  BP(mean): --  RR: 18 (12 Jul 2021 08:26) (16 - 22)  SpO2: 95% (12 Jul 2021 08:26) (93% - 98%)    Physical Exam:  Patient comfortable  AXOX3. Morbidly obese  Neck supple, no LN's  Chest: bilateral breath sounds, no wheeze or rales  CVS: regular heart rate without murmur  Abdomen: soft, BS+, no massess or organomegaly  CNS: no gross deficit  Ext: no edema

## 2021-07-12 NOTE — H&P ADULT - NSHPLABSRESULTS_GEN_ALL_CORE
14.0   6.70  )-----------( 143      ( 11 Jul 2021 10:43 )             40.3     07-11    132<L>  |  92<L>  |  14  ----------------------------<  295<H>  3.4<L>   |  24  |  0.72    Ca    9.1      11 Jul 2021 10:43    TPro  7.8  /  Alb  4.2  /  TBili  1.0  /  DBili  x   /  AST  28  /  ALT  23  /  AlkPhos  51  07-11    Sedimentation Rate, Erythrocyte: 48 mm/hr (07.11.21 @ 15:26) C-Reactive Protein, Serum: 38 mg/L (07.11.21 @ 10:43) Rapid RVP Result: NotDetec (07.11.21 @ 19:21) Urinalysis (07.11.21 @ 12:49)   pH Urine: 6.5   Glucose Qualitative, Urine: 500 mg/dL   Blood, Urine: Small   Color: Yellow   Urine Appearance: Clear   Bilirubin: Negative   Ketone - Urine: Negative   Specific Gravity: 1.019   Protein, Urine: 30 mg/dL   Urobilinogen: Negative   Nitrite: Negative   Leukocyte Esterase Concentration: Negative     Blood Gas Venous - Lactate: 1.8 mmoL/L (07.11.21 @ 10:44)     < from: CT Abdomen and Pelvis w/ IV Cont (07.11.21 @ 14:48) >    MPRESSION:    Mild wall thickening of the cecum and proximal ascending colon with pericolic inflammatory change, suggesting colitis. Differential includes infectious, inflammatory and ischemic etiology. Given focal nature, if not recently performed, recommend colonoscopy after resolution of symptomatology to exclude underlying lesion.    Left adnexa isprominent and inseparable from multiple collateral vessels. Consider more definitive characterization with pelvic ultrasound.    Splenomegaly and extensive lymphadenopathy of uncertain etiology. Differential includes inflammatory and neoplastic etiologies. Correlate clinically.    A partially imaged fat density right pericardial structure is indeterminate. Consider CT chest for further characterization.    --- End of Report ---    < end of copied text >    < from: US Transvaginal (07.11.21 @ 18:58) >    IMPRESSION:  Echogenic material within the endometrium, likely representing clot in the context of recent D&C.    --- End of Report ---    < end of copied text > Labs, imaging and EKG tracing personally reviewed    14.0   6.70  )-----------( 143      ( 11 Jul 2021 10:43 )             40.3     07-11    132<L>  |  92<L>  |  14  ----------------------------<  295<H>  3.4<L>   |  24  |  0.72    Ca    9.1      11 Jul 2021 10:43    TPro  7.8  /  Alb  4.2  /  TBili  1.0  /  DBili  x   /  AST  28  /  ALT  23  /  AlkPhos  51  07-11    Sedimentation Rate, Erythrocyte: 48 mm/hr (07.11.21 @ 15:26) C-Reactive Protein, Serum: 38 mg/L (07.11.21 @ 10:43) Rapid RVP Result: NotDetec (07.11.21 @ 19:21) Urinalysis (07.11.21 @ 12:49)   pH Urine: 6.5   Glucose Qualitative, Urine: 500 mg/dL   Blood, Urine: Small   Color: Yellow   Urine Appearance: Clear   Bilirubin: Negative   Ketone - Urine: Negative   Specific Gravity: 1.019   Protein, Urine: 30 mg/dL   Urobilinogen: Negative   Nitrite: Negative   Leukocyte Esterase Concentration: Negative     Blood Gas Venous - Lactate: 1.8 mmoL/L (07.11.21 @ 10:44)     < from: CT Abdomen and Pelvis w/ IV Cont (07.11.21 @ 14:48) >    MPRESSION:    Mild wall thickening of the cecum and proximal ascending colon with pericolic inflammatory change, suggesting colitis. Differential includes infectious, inflammatory and ischemic etiology. Given focal nature, if not recently performed, recommend colonoscopy after resolution of symptomatology to exclude underlying lesion.    Left adnexa isprominent and inseparable from multiple collateral vessels. Consider more definitive characterization with pelvic ultrasound.    Splenomegaly and extensive lymphadenopathy of uncertain etiology. Differential includes inflammatory and neoplastic etiologies. Correlate clinically.    A partially imaged fat density right pericardial structure is indeterminate. Consider CT chest for further characterization.    --- End of Report ---    < end of copied text >    < from: US Transvaginal (07.11.21 @ 18:58) >    IMPRESSION:  Echogenic material within the endometrium, likely representing clot in the context of recent D&C.    --- End of Report ---    < end of copied text >

## 2021-07-13 LAB
ANION GAP SERPL CALC-SCNC: 10 MMOL/L — SIGNIFICANT CHANGE UP (ref 5–17)
BUN SERPL-MCNC: 18 MG/DL — SIGNIFICANT CHANGE UP (ref 7–23)
C TRACH RRNA SPEC QL NAA+PROBE: SIGNIFICANT CHANGE UP
CALCIUM SERPL-MCNC: 8.8 MG/DL — SIGNIFICANT CHANGE UP (ref 8.4–10.5)
CHLORIDE SERPL-SCNC: 98 MMOL/L — SIGNIFICANT CHANGE UP (ref 96–108)
CO2 SERPL-SCNC: 26 MMOL/L — SIGNIFICANT CHANGE UP (ref 22–31)
CREAT SERPL-MCNC: 0.85 MG/DL — SIGNIFICANT CHANGE UP (ref 0.5–1.3)
DRVVT SCREEN TO CONFIRM RATIO: SIGNIFICANT CHANGE UP
GLUCOSE BLDC GLUCOMTR-MCNC: 177 MG/DL — HIGH (ref 70–99)
GLUCOSE BLDC GLUCOMTR-MCNC: 218 MG/DL — HIGH (ref 70–99)
GLUCOSE BLDC GLUCOMTR-MCNC: 235 MG/DL — HIGH (ref 70–99)
GLUCOSE BLDC GLUCOMTR-MCNC: 324 MG/DL — HIGH (ref 70–99)
GLUCOSE SERPL-MCNC: 304 MG/DL — HIGH (ref 70–99)
HCV AB S/CO SERPL IA: 0.14 S/CO — SIGNIFICANT CHANGE UP (ref 0–0.99)
HCV AB SERPL-IMP: SIGNIFICANT CHANGE UP
LA NT DPL PPP QL: 41.8 SEC — SIGNIFICANT CHANGE UP
LDH SERPL L TO P-CCNC: 301 U/L — HIGH (ref 50–242)
MAGNESIUM SERPL-MCNC: 1.9 MG/DL — SIGNIFICANT CHANGE UP (ref 1.6–2.6)
N GONORRHOEA RRNA SPEC QL NAA+PROBE: SIGNIFICANT CHANGE UP
NORMALIZED SCT PPP-RTO: 1.1 RATIO — SIGNIFICANT CHANGE UP (ref 0–1.16)
NORMALIZED SCT PPP-RTO: SIGNIFICANT CHANGE UP
PHOSPHATE SERPL-MCNC: 2.6 MG/DL — SIGNIFICANT CHANGE UP (ref 2.5–4.5)
POTASSIUM SERPL-MCNC: 3.2 MMOL/L — LOW (ref 3.5–5.3)
POTASSIUM SERPL-SCNC: 3.2 MMOL/L — LOW (ref 3.5–5.3)
SODIUM SERPL-SCNC: 134 MMOL/L — LOW (ref 135–145)
SPECIMEN SOURCE: SIGNIFICANT CHANGE UP
SURGICAL PATHOLOGY STUDY: SIGNIFICANT CHANGE UP

## 2021-07-13 PROCEDURE — 76536 US EXAM OF HEAD AND NECK: CPT | Mod: 26

## 2021-07-13 PROCEDURE — 93306 TTE W/DOPPLER COMPLETE: CPT | Mod: 26

## 2021-07-13 PROCEDURE — 99232 SBSQ HOSP IP/OBS MODERATE 35: CPT

## 2021-07-13 RX ORDER — POTASSIUM CHLORIDE 20 MEQ
40 PACKET (EA) ORAL EVERY 4 HOURS
Refills: 0 | Status: COMPLETED | OUTPATIENT
Start: 2021-07-13 | End: 2021-07-13

## 2021-07-13 RX ORDER — SODIUM,POTASSIUM PHOSPHATES 278-250MG
1 POWDER IN PACKET (EA) ORAL
Refills: 0 | Status: COMPLETED | OUTPATIENT
Start: 2021-07-13 | End: 2021-07-14

## 2021-07-13 RX ORDER — AZITHROMYCIN 500 MG/1
500 TABLET, FILM COATED ORAL DAILY
Refills: 0 | Status: DISCONTINUED | OUTPATIENT
Start: 2021-07-13 | End: 2021-07-14

## 2021-07-13 RX ADMIN — PIPERACILLIN AND TAZOBACTAM 25 GRAM(S): 4; .5 INJECTION, POWDER, LYOPHILIZED, FOR SOLUTION INTRAVENOUS at 05:37

## 2021-07-13 RX ADMIN — Medication 10 MILLIGRAM(S): at 13:59

## 2021-07-13 RX ADMIN — Medication 12.5 MILLIGRAM(S): at 05:37

## 2021-07-13 RX ADMIN — MONTELUKAST 10 MILLIGRAM(S): 4 TABLET, CHEWABLE ORAL at 12:49

## 2021-07-13 RX ADMIN — Medication 50 MICROGRAM(S): at 05:38

## 2021-07-13 RX ADMIN — Medication 2: at 09:06

## 2021-07-13 RX ADMIN — ATORVASTATIN CALCIUM 20 MILLIGRAM(S): 80 TABLET, FILM COATED ORAL at 21:41

## 2021-07-13 RX ADMIN — Medication 40 MILLIEQUIVALENT(S): at 17:36

## 2021-07-13 RX ADMIN — Medication 2: at 12:48

## 2021-07-13 RX ADMIN — Medication 1 PACKET(S): at 13:41

## 2021-07-13 RX ADMIN — Medication 1: at 17:36

## 2021-07-13 RX ADMIN — Medication 100 MILLIGRAM(S): at 09:07

## 2021-07-13 RX ADMIN — AMLODIPINE BESYLATE 10 MILLIGRAM(S): 2.5 TABLET ORAL at 05:37

## 2021-07-13 RX ADMIN — Medication 40 MILLIEQUIVALENT(S): at 12:49

## 2021-07-13 RX ADMIN — Medication 1 PACKET(S): at 17:36

## 2021-07-13 RX ADMIN — Medication 100 MILLIGRAM(S): at 01:10

## 2021-07-13 RX ADMIN — LOSARTAN POTASSIUM 100 MILLIGRAM(S): 100 TABLET, FILM COATED ORAL at 05:38

## 2021-07-13 RX ADMIN — AZITHROMYCIN 500 MILLIGRAM(S): 500 TABLET, FILM COATED ORAL at 17:36

## 2021-07-13 RX ADMIN — Medication 2: at 21:41

## 2021-07-13 NOTE — PROGRESS NOTE ADULT - ASSESSMENT
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 Patient states that after the D&C on Friday she was recovering appropriately, tolerating PO, ambulating, mild abdominal cramping, no vaginal discharge or vaginal bleeding. Yesterday evening, she noted a fever of 103. She had some relief with Tylenol and cold compresses. This morning she was febrile again and presented to the ED. She reports associated nausea, urinary frequency and urgency. No vomiting or diarrhea. Also reporting diffuse fatigue, frontal pressure-like headache and neck ache, improved with Tylenol. Denies any CP, SOB, cough, focal numbness, tingling, weakness, n/v/d, sick contact.   Patient is UTD on all cancer screenings, colonoscopy 4 years ago wnl, mammograms and Pap Smears also benign.     On arrival to the ED, patient febrile to 103.2, , -167/57-81, RR 16-22 O2 93-98% on RA. CBC with no leukocytosis, platelets 143, ESR/CRP 48/38, lactate 1.8, UA clean, RVP negative, CT A/P w/ colitis of cecum and proximal ascending colon, prominent left adnexa, splenomegaly and extensive lymphadenopathy, indeterminate partially imaged fat density right pericardial structure. Transvaginal ultrasound reveals echogenic material within the endometrium. CXR unrevealing.    (12 Jul 2021 00:12)    Pt recalls having chick fillet for lunch on Saturday . Pt denies antibiotics prior to the procedure. Pt still with loose stool but temps are less on antibiotics    A/P  1) Fever  pt found to have campylobacter on GI-PCR  abs changed to azithromycin  final sensitivities  will be obtained after /if cultures grow   BCs negative so far    2) Lymphadenopathy/ splenomegaly  check LDH  check DWAYNE  check EBV serology, check toxo serology  HIV is negative  RVP/SARS CoV2 - negative   ?PET scan  check ACE  check quanteferon  IR deciding how to approach LN biopsy as patient had been on aspirin     3? s/p GYN procedure  monitor for bleeding  GYN is following    4) pericardial effusion  check cardiac echo    Yoselyn Frazier M.D. ,   Pager [phone]     after 5PM/ weekends [phone]      Assessment and plan discussed with the primary team .

## 2021-07-13 NOTE — CONSULT NOTE ADULT - CONSULT REQUESTED DATE/TIME
12-Jul-2021 10:51
14-Jul-2021 21:03
12-Jul-2021 13:40
12-Jul-2021 16:17
12-Jul-2021 11:40
11-Jul-2021 12:25
12-Jul-2021

## 2021-07-13 NOTE — PROGRESS NOTE ADULT - ASSESSMENT
56 y/o postmenopausal female        with PMHx of T2DM (A1c 7.6% 2021), morbid obesity s/p vertical sleeve gastrectomy in 2018, HTN, HLD,       morbid obesity, AQUILES not on CPAP, depression, mild asthma         D&C and polyp removal  on 7/9, for endometrial hyperplasia , no perforation pe r note       * presenting with fevers at home from  7/10         with  bandemia,  , mild lactate elevation.,  with   resolving sepsis, from  ?  endometritis/ colitis         no abd pain. mild  non bloody diarrhea        house  ID  dr lynn, seen by gyn/ spoke  with dr charles, gyn    *  CT   with  colitis/  prominent left adnexa/  splenomegaly/ and,  l adenopathy/ pericardial density            need  to  r/o malignancy           oncology dr hughes,   gi dr kendrick/  card  called   *   HTN/  HLD         on asa, lipitor/ norvasc/ cozaar/  hxtz   *  bmi is  42    *   Ffevers,  on iv  zosyn,     not ill  appearing              blood  and urine   c/s, is negative            fevers  may be  from her presumed  lymphoma    *     CT chest,    mediastinal  and  supraclavicular l adenopathy /  Ct a/p,  RP, retrocrural, etc  l adenopathy             need  to  r/o lymphoma          IR  eval  for   l node bx/  per IR, pt needs  to be off  asa  for 5  days       awiat  echo     rad< from: CT Chest No Cont (07.12.21 @ 12:45) >  IMPRESSION: Multiple lymph nodes are present in the mediastinum and supraclavicular region bilaterally as described above. Exact etiology is unclear. One of the differential diagnostic consideration includes lymphoma.  Very small pericardial effusion  < end of copied text >      rad< from: US Transvaginal (07.11.21 @ 18:58) >  Other: Multiple collateral vessels in the pelvis.  IMPRESSION:  Echogenic material within the endometrium, likely representing clot in the context of recent D&C.  < end of copied text >     ra< from: CT Abdomen and Pelvis w/ IV Cont (07.11.21 @ 14:48) >  MPRESSION:  Mild wall thickening of the cecum and proximal ascending colon with pericolic inflammatory change, suggesting colitis. Differential includes infectious, inflammatory and ischemic etiology. Given focal nature, if not recently performed, recommend colonoscopy after resolution of symptomatology to exclude underlying lesion.  Left adnexa isprominent and inseparable from multiple collateral vessels. Consider more definitive characterization with pelvic ultrasound.  Splenomegaly and extensive lymphadenopathy of uncertain etiology. Differential includes inflammatory and neoplastic etiologies. Correlate clinically.  A partially imaged fat density right pericardial structure is indeterminate. Consider CT chest for further characterization.  --- End of Report --  < end of copied text >   54 y/o postmenopausal female        with PMHx of T2DM (A1c 7.6% 2021), morbid obesity s/p vertical sleeve gastrectomy in 2018, HTN, HLD,       morbid obesity, AQUILES not on CPAP, depression, mild asthma         D&C and polyp removal  on 7/9, for endometrial hyperplasia , no perforation pe r note       * presenting with fevers at home from  7/10         with  bandemia,  , mild lactate elevation.,  with   resolving sepsis, from  ?  endometritis/ colitis         no abd pain. mild  non bloody diarrhea        house  ID  dr lynn, seen by gyn/ spoke  with dr charles, gyn    *  CT   with  colitis/  prominent left adnexa/  splenomegaly/ and,  l adenopathy/ pericardial density            need  to  r/o malignancy           oncology dr hughes,   gi dr kendrick/  card  called   *   HTN/  HLD         on asa, lipitor/ norvasc/ cozaar/  hxtz   *  bmi is  42    *   Ffevers,  on iv  zosyn,     not ill  appearing              blood  and urine   c/s, is negative            fevers  may be  from her presumed  lymphoma    *     CT chest,    mediastinal  and  supraclavicular l adenopathy /  Ct a/p,  RP, retrocrural, etc  l adenopathy             need  to  r/o lymphoma          IR  eval  for   l node bx/  per IR, pt needs  to be off  asa  for 5  days       awiat  echo       surg  onc  called     rad< from: CT Chest No Cont (07.12.21 @ 12:45) >  IMPRESSION: Multiple lymph nodes are present in the mediastinum and supraclavicular region bilaterally as described above. Exact etiology is unclear. One of the differential diagnostic consideration includes lymphoma.  Very small pericardial effusion  < end of copied text >      rad< from: US Transvaginal (07.11.21 @ 18:58) >  Other: Multiple collateral vessels in the pelvis.  IMPRESSION:  Echogenic material within the endometrium, likely representing clot in the context of recent D&C.  < end of copied text >     ra< from: CT Abdomen and Pelvis w/ IV Cont (07.11.21 @ 14:48) >  MPRESSION:  Mild wall thickening of the cecum and proximal ascending colon with pericolic inflammatory change, suggesting colitis. Differential includes infectious, inflammatory and ischemic etiology. Given focal nature, if not recently performed, recommend colonoscopy after resolution of symptomatology to exclude underlying lesion.  Left adnexa isprominent and inseparable from multiple collateral vessels. Consider more definitive characterization with pelvic ultrasound.  Splenomegaly and extensive lymphadenopathy of uncertain etiology. Differential includes inflammatory and neoplastic etiologies. Correlate clinically.  A partially imaged fat density right pericardial structure is indeterminate. Consider CT chest for further characterization.  --- End of Report --  < end of copied text >

## 2021-07-13 NOTE — PROGRESS NOTE ADULT - PROBLEM SELECTOR PLAN 1
pt with s/p recent GYN surgery  CT showed coolitis  PCR results show campylobacter   continue IV abx  continue diet as tolerated   will follow. pt with s/p recent GYN surgery  CT showed colitis  PCR results show campylobacter   continue diet as tolerated   patient stable for discharge  recommend dcing on PO zithromax for today and tomorrow   d/w patient pt with s/p recent GYN surgery  CT showed colitis  PCR results show campylobacter   continue diet as tolerated   encourage patient to stay hydrated    patient stable for discharge  recommend dcing on PO zithromax for today and tomorrow   d/w patient

## 2021-07-13 NOTE — CONSULT NOTE ADULT - SUBJECTIVE AND OBJECTIVE BOX
General Surgery Consult Note  Attending: Dr. 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 Patient found to have colitis for which she was treated medically with antibiotics. During her hospital stay, CT A/P revealed colitis of cecum and proximal ascending colon, prominent left adnexa, splenomegaly and extensive lymphadenopathy, indeterminate partially imaged fat density right pericardial structure. Transvaginal ultrasound reveals echogenic material within the endometrium.    In light of incidental finding of extensive lymphadenopathy, IR consulted for LN biopsy. Given LN proximity to major vessel, IR recommended holding ASA for 5 days then they will pursue bx. Surgery then consulted for evaluation for LN biopsy.    PAST MEDICAL & SURGICAL HISTORY:  HTN (hypertension)  Diabetes  Depression  Hypothyroid  Asthma  well controlled  Hypokalemia  Morbidly obese  AQUILES (obstructive sleep apnea)  Hyperlipidemia  S/P tonsillectomy  S/P laparoscopic sleeve gastrectomy 2018  History of ectopic pregnancy    ALLERGIES:  No Known Allergies    Intolerance  amoxicillin (Nausea)  Avelox (Other)    FAMILY HISTORY:  No pertinent family history in first degree relatives    PHYSICAL EXAM:  General: NAD, resting comfortably  HEENT: NC/AT, EOMI, normal hearing, no oral lesions, no LAD, neck supple, no palpable lymphadenopathy  Pulmonary: normal resp effort, CTA-B  Cardiovascular: NSR, no murmurs  Abdominal: soft, ND/NT, no organomegaly  Extremities: WWP, normal strength, no clubbing/cyanosis/edema  Pulses: palpable distal pulses    VITAL SIGNS:  Vital Signs Last 24 Hrs  T(C): 37.5 (13 Jul 2021 20:08), Max: 37.5 (13 Jul 2021 12:03)  T(F): 99.5 (13 Jul 2021 20:08), Max: 99.5 (13 Jul 2021 12:03)  HR: 76 (13 Jul 2021 20:08) (67 - 76)  BP: 137/80 (13 Jul 2021 20:08) (97/61 - 137/80)  BP(mean): --  RR: 18 (13 Jul 2021 20:08) (18 - 18)  SpO2: 97% (13 Jul 2021 20:08) (95% - 98%)    I&O's Summary    12 Jul 2021 07:01  -  13 Jul 2021 07:00  --------------------------------------------------------  IN: 540 mL / OUT: 0 mL / NET: 540 mL    13 Jul 2021 07:01  -  14 Jul 2021 04:45  --------------------------------------------------------  IN: 240 mL / OUT: 0 mL / NET: 240 mL    LABS:                        13.5   5.67  )-----------( 121      ( 12 Jul 2021 07:08 )             40.9     07-13    134<L>  |  98  |  18  ----------------------------<  304<H>  3.2<L>   |  26  |  0.85    Ca    8.8      13 Jul 2021 11:13  Phos  2.6     07-13  Mg     1.9     07-13    TPro  7.6  /  Alb  4.2  /  TBili  0.9  /  DBili  x   /  AST  25  /  ALT  19  /  AlkPhos  50  07-12    CAPILLARY BLOOD GLUCOSE  POCT Blood Glucose.: 324 mg/dL (13 Jul 2021 21:32)  POCT Blood Glucose.: 177 mg/dL (13 Jul 2021 17:10)  POCT Blood Glucose.: 218 mg/dL (13 Jul 2021 12:20)  POCT Blood Glucose.: 235 mg/dL (13 Jul 2021 08:44)    LIVER FUNCTIONS - ( 12 Jul 2021 07:08 )  Alb: 4.2 g/dL / Pro: 7.6 g/dL / ALK PHOS: 50 U/L / ALT: 19 U/L / AST: 25 U/L / GGT: x           CULTURES:  Culture Results:   Testing in progress (07-12 @ 16:22)  Culture Results:   No enteric pathogens to date: Final culture pending (07-12 @ 16:19)  Culture Results:   Campylobacter species  DETECTED by PCR  *******Please Note:*******  GI panel PCR evaluates for:  Campylobacter, Plesiomonas shigelloides, Salmonella,  Vibrio, Yersinia enterocolitica, Enteroaggregative  Escherichia coli (EAEC), Enteropathogenic E.coli (EPEC),  Enterotoxigenic E. coli (ETEC) lt/st, Shiga-like  toxin-producing E. coli (STEC) stx1/stx2,  Shigella/ Enteroinvasive E. coli (EIEC), Cryptosporidium,  Cyclospora cayetanensis, Entamoeba histolytica,  Giardia lamblia, Adenovirus F 40/41, Astrovirus,  Norovirus GI/GII, Rotavirus A, Sapovirus (07-12 @ 16:19)      RADIOLOGY & ADDITIONAL STUDIES:    CT Abdomen and Pelvis w/ IV Cont (07.11.21 @ 14:48)    FINDINGS:  LOWER CHEST: Trace pericardial effusion. Aortic valve and mitral annular calcifications. A partially imaged fat density right pericardial structure measuring 5.1 x 3.3 cm (2.5) inclusive of solid nodules/possible lymph nodes, of uncertain etiology. Mildly enlarged right cardiophrenic lymph nodes with a reference measuring 1.5 x 1.4 cm (2:13).    LIVER: Within normal limits.  BILE DUCTS: Normal caliber.  GALLBLADDER: Within normal limits.  SPLEEN: Splenomegaly measuring 17.7 cm.  PANCREAS: Within normal limits.  ADRENALS: Within normal limits.  KIDNEYS/URETERS: A right interpole cyst and a nonobstructing 4 mm right interpole stone. No hydronephrosis.    BLADDER: Within normal limits.  REPRODUCTIVE ORGANS: Fibroid uterus. Left adnexa is prominent and inseparable from multiple collateral vessels. No right adnexal mass.    BOWEL: Focal thickening of the cecum and proximal ascending colon with mild pericolic inflammatory change. No bowel obstruction. Appendix is normal. Status post sleeve gastrectomy.  PERITONEUM: No ascites. Multiple subcentimeter in short axis mesenteric nodes, nonspecific.  VESSELS: Atherosclerotic changes. Patent portal veins. Prominent collateral vessels extending from the left adnexa to the portal venous system. Mildly dilated left gonadal vein to 0.8 cm.  RETROPERITONEUM/LYMPH NODES: Numerous mildly enlarged lymph nodes including cardiophrenic, retrocrural, periportal, retroperitoneal, and mesenteric nodes, of uncertain etiology. A reference lymph node just superior to the left renal vein measures 3 x 1.6 cm (2:41).  ABDOMINAL WALL: Within normal limits.  BONES: Degenerative changes.    IMPRESSION:  Mild wall thickening of the cecum and proximal ascending colon with pericolic inflammatory change, suggesting colitis. Differential includes infectious, inflammatory and ischemic etiology. Given focal nature, if not recently performed, recommend colonoscopy after resolution of symptomatology to exclude underlying lesion.  Left adnexa is prominent and inseparable from multiple collateral vessels. Consider more definitive characterization with pelvic ultrasound.  Splenomegaly and extensive lymphadenopathy of uncertain etiology. Differential includes inflammatory and neoplastic etiologies. Correlate clinically.  A partially imaged fat density right pericardial structure is indeterminate. Consider CT chest for further characterization.    < from: CT Chest No Cont (07.12.21 @ 12:45) >  Multiple lymphnodes are present in the supraclavicular region bilaterally.    Multiple lymph nodes are present in the anterior mediastinum, left internal mammary chain, pretracheal space, AP window and in the right cardiophrenic angle. One of the largest lymph node is noted in the anterior mediastinum and measures 2 x 2 centimeters. Heart is normal in size. Calcification of the coronary arteries is noted. Very small pericardial effusion is noted.    No endobronchial lesions are noted. Lungs are clear. No pleural effusions are noted.    For interpretation of the abdominal contents, please see the report of the CT scan of the abdomen performed on 7/11/2021.    Degenerative changes of the spine are noted. 7/12/2021    IMPRESSION: Multiple lymph nodes are present in the mediastinum and supraclavicular region bilaterally as described above. Exact etiology is unclear. One of the differential diagnostic consideration includes lymphoma.  Very small pericardial effusion.

## 2021-07-13 NOTE — CHART NOTE - NSCHARTNOTEFT_GEN_A_CORE
IR Update: approved for left subclavian lymph node biopsy, however need aspirin off 5 days due to tenuous position adjacent to subclavian artery and vein. Can perform either as inpatient or outpatient (IR booking office is [phone]). Awaiting decision by primary team.

## 2021-07-13 NOTE — PROGRESS NOTE ADULT - ASSESSMENT
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  CT A/P w/ colitis of cecum and proximal ascending colon, prominent left adnexa, splenomegaly and extensive lymphadenopathy, indeterminate partially imaged fat density right pericardial structure. Transvaginal ultrasound reveals echogenic material within the endometrium.  Multiple lymphnodes are present in the supraclavicular region bilaterally.  Multiple lymph nodes are present in the anterior mediastinum, left internal mammary chain, pretracheal space, AP window and in the right cardiophrenic angle. One of the largest lymph node is noted in the anterior mediastinum and measures 2 x 2 centimeters. Heart is normal in size. Calcification of the coronary arteries is noted. Very small pericardial effusion is noted.  Dx of lymphomatous process in dd of extensive lymphadenpathy. given the lack of systemic symptoms except before admission, unlikely to be high grade, LDH, uric acid not high.   She  needs biopsy, IR or surgery as feasible, IR note noted. 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  CT A/P w/ colitis of cecum and proximal ascending colon, prominent left adnexa, splenomegaly and extensive lymphadenopathy, indeterminate partially imaged fat density right pericardial structure. Transvaginal ultrasound reveals echogenic material within the endometrium.  Multiple lymphnodes are present in the supraclavicular region bilaterally.  Multiple lymph nodes are present in the anterior mediastinum, left internal mammary chain, pretracheal space, AP window and in the right cardiophrenic angle. One of the largest lymph node is noted in the anterior mediastinum and measures 2 x 2 centimeters. Heart is normal in size. Calcification of the coronary arteries is noted. Very small pericardial effusion is noted.  Dx of lymphomatous process in dd of extensive lymphadenpathy. given the lack of systemic symptoms except before admission, unlikely to be high grade, LDH, uric acid not high.   She  needs biopsy, IR or surgery as feasible, IR note noted. If lymphoma will be needing enough tissues for immunostains etc  No h/o VTE, LA negative, reviewed labs from earlier, she only had ISIDORO IgM positive  GI note and stool result of Campylobacter by PCR noted

## 2021-07-13 NOTE — CONSULT NOTE ADULT - CONSULT REASON
Colitis
Lymphadenopathy
Lymph Node biopsy, mediastinal or supraclavicular
fever
fever POD#2 from D&C/Hysteroscopy
Incidental extensive lymphadenopathy on CT imaging
chest pain/ pericardial effusion

## 2021-07-13 NOTE — CONSULT NOTE ADULT - ASSESSMENT
55F presenting with colitis being managed medically with IV abx who is incidentally found to have extensive RP lymphadenopathy. Surgery consulted for possible LN biopsy.    PLAN:  - Patient's case to be reviewed and evaluated by surgical oncology attending for feasibility of surgical LN biopsy.    Discussed with attending Surgical Oncologist Dr. Serafin Hurley.    YESY Fisher, PGY-2   [facility]   Red Team Surgery   [id number]

## 2021-07-13 NOTE — PROGRESS NOTE ADULT - SUBJECTIVE AND OBJECTIVE BOX
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 Mild abdominal cramping, no vaginal discharge or vaginal bleeding. She had associated nausea, urinary frequency and urgency. Patient is UTD on all cancer screenings, colonoscopy 4 years ago wnl, mammograms and Pap Smears also benign. CT A/P w/ colitis of cecum and proximal ascending colon, prominent left adnexa, splenomegaly and extensive lymphadenopathy, indeterminate partially imaged fat density right pericardial structure. Transvaginal ultrasound reveals echogenic material within the endometrium. CXR unrevealing.   Multiple lymphnodes are present in the supraclavicular region bilaterally.  Multiple lymph nodes are present in the anterior mediastinum, left internal mammary chain, pretracheal space, AP window and in the right cardiophrenic angle. One of the largest lymph node is noted in the anterior mediastinum and measures 2 x 2 centimeters. Heart is normal in size. Calcification of the coronary arteries is noted. Very small pericardial effusion is noted.  PAST MEDICAL & SURGICAL HISTORY:  HTN (hypertension)    Diabetes    Depression    Hypothyroid    Asthma  well controlled    Hypokalemia    Morbidly obese    AQUILES (obstructive sleep apnea)    Hyperlipidemia    S/P tonsillectomy    S/P laparoscopic sleeve gastrectomy  2018    History of ectopic pregnancy      Medications:  acetaminophen   Tablet .. 650 milliGRAM(s) Oral every 6 hours PRN Mild Pain (1 - 3)  amLODIPine   Tablet 10 milliGRAM(s) Oral daily  atorvastatin 20 milliGRAM(s) Oral at bedtime  azithromycin   Tablet 500 milliGRAM(s) Oral daily  dextrose 40% Gel 15 Gram(s) Oral once  dextrose 5%. 1000 milliLiter(s) IV Continuous <Continuous>  dextrose 5%. 1000 milliLiter(s) IV Continuous <Continuous>  dextrose 50% Injectable 25 Gram(s) IV Push once  dextrose 50% Injectable 12.5 Gram(s) IV Push once  dextrose 50% Injectable 25 Gram(s) IV Push once  FLUoxetine 10 milliGRAM(s) Oral daily  glucagon  Injectable 1 milliGRAM(s) IntraMuscular once  glycopyrrolate 9 MICROgram(s)/formoterol 4.8 MICROgram(s) Inhaler 2 Puff(s) Inhalation two times a day  hydrochlorothiazide 12.5 milliGRAM(s) Oral daily  insulin lispro (ADMELOG) corrective regimen sliding scale   SubCutaneous three times a day before meals  insulin lispro (ADMELOG) corrective regimen sliding scale   SubCutaneous at bedtime  levothyroxine 50 MICROGram(s) Oral daily  losartan 100 milliGRAM(s) Oral daily  montelukast 10 milliGRAM(s) Oral daily  potassium chloride    Tablet ER 40 milliEquivalent(s) Oral every 4 hours  potassium phosphate / sodium phosphate Powder (PHOS-NaK) 1 Packet(s) Oral three times a day with meals    Labs:  CBC Full  -  ( 12 Jul 2021 07:08 )  WBC Count : 5.67 K/uL  Hemoglobin : 13.5 g/dL  Hematocrit : 40.9 %  Platelet Count - Automated : 121 K/uL  Mean Cell Volume : 88.7 fl  Mean Cell Hemoglobin : 29.3 pg  Mean Cell Hemoglobin Concentration : 33.0 gm/dL  Auto Neutrophil # : 5.03 K/uL  Auto Lymphocyte # : 0.20 K/uL  Auto Monocyte # : 0.44 K/uL  Auto Eosinophil # : 0.00 K/uL  Auto Basophil # : 0.00 K/uL  Auto Neutrophil % : 67.0 %  Auto Lymphocyte % : 3.5 %  Auto Monocyte % : 7.8 %  Auto Eosinophil % : 0.0 %  Auto Basophil % : 0.0 %    07-13    134<L>  |  98  |  18  ----------------------------<  304<H>  3.2<L>   |  26  |  0.85    Ca    8.8      13 Jul 2021 11:13  Phos  2.6     07-13  Mg     1.9     07-13    TPro  7.6  /  Alb  4.2  /  TBili  0.9  /  DBili  x   /  AST  25  /  ALT  19  /  AlkPhos  50  07-12      Radiology:             ROS:  Patient comfortable without distress  On meds for diarrhea  Vital Signs Last 24 Hrs  T(C): 37.5 (13 Jul 2021 12:03), Max: 37.8 (12 Jul 2021 14:00)  T(F): 99.5 (13 Jul 2021 12:03), Max: 100.1 (12 Jul 2021 14:00)  HR: 75 (13 Jul 2021 13:04) (67 - 93)  BP: 122/77 (13 Jul 2021 13:04) (97/61 - 138/70)  BP(mean): --  RR: 18 (13 Jul 2021 12:03) (18 - 18)  SpO2: 95% (13 Jul 2021 12:03) (94% - 98%)    Physical exam:  Patient alert and oriented  No distress Obese  CVS: S1, S2 regular or murmur  Chest: bilateral breath sound without rales  Abdomen: soft, not tender, no organomegaly or masses  No focal neuro deficit  No edema      Assessment and Plan:

## 2021-07-13 NOTE — PROGRESS NOTE ADULT - SUBJECTIVE AND OBJECTIVE BOX
febrile    REVIEW OF SYSTEMS:  GEN: no fever,    no chills  RESP: no SOB,   no cough  CVS: no chest pain,   no palpitations  GI: no abdominal pain,   no nausea,   no vomiting,   no constipation,   no diarrhea  : no dysuria,   no frequency  NEURO: no headache,   no dizziness  PSYCH: no depression,   not anxious  Derm : no rash    MEDICATIONS  (STANDING):  amLODIPine   Tablet 10 milliGRAM(s) Oral daily  atorvastatin 20 milliGRAM(s) Oral at bedtime  dextrose 40% Gel 15 Gram(s) Oral once  dextrose 5%. 1000 milliLiter(s) (50 mL/Hr) IV Continuous <Continuous>  dextrose 5%. 1000 milliLiter(s) (100 mL/Hr) IV Continuous <Continuous>  dextrose 50% Injectable 25 Gram(s) IV Push once  dextrose 50% Injectable 12.5 Gram(s) IV Push once  dextrose 50% Injectable 25 Gram(s) IV Push once  FLUoxetine 10 milliGRAM(s) Oral daily  glucagon  Injectable 1 milliGRAM(s) IntraMuscular once  glycopyrrolate 9 MICROgram(s)/formoterol 4.8 MICROgram(s) Inhaler 2 Puff(s) Inhalation two times a day  hydrochlorothiazide 12.5 milliGRAM(s) Oral daily  insulin lispro (ADMELOG) corrective regimen sliding scale   SubCutaneous three times a day before meals  insulin lispro (ADMELOG) corrective regimen sliding scale   SubCutaneous at bedtime  levothyroxine 50 MICROGram(s) Oral daily  losartan 100 milliGRAM(s) Oral daily  metroNIDAZOLE  IVPB 500 milliGRAM(s) IV Intermittent every 8 hours  montelukast 10 milliGRAM(s) Oral daily  piperacillin/tazobactam IVPB.. 3.375 Gram(s) IV Intermittent every 8 hours    MEDICATIONS  (PRN):  acetaminophen   Tablet .. 650 milliGRAM(s) Oral every 6 hours PRN Mild Pain (1 - 3)      Vital Signs Last 24 Hrs  T(C): 37.2 (2021 04:53), Max: 37.8 (2021 14:00)  T(F): 98.9 (2021 04:53), Max: 100.1 (2021 14:00)  HR: 67 (2021 04:53) (67 - 93)  BP: 119/73 (2021 04:53) (119/73 - 138/70)  BP(mean): --  RR: 18 (2021 04:53) (18 - 18)  SpO2: 98% (2021 04:53) (94% - 98%)  CAPILLARY BLOOD GLUCOSE      POCT Blood Glucose.: 222 mg/dL (2021 21:40)  POCT Blood Glucose.: 258 mg/dL (2021 17:03)  POCT Blood Glucose.: 211 mg/dL (2021 13:38)  POCT Blood Glucose.: 261 mg/dL (2021 09:39)    I&O's Summary    2021 07:01  -  2021 07:00  --------------------------------------------------------  IN: 240 mL / OUT: 0 mL / NET: 240 mL        PHYSICAL EXAM:  HEAD:  Atraumatic, Normocephalic  NECK: Supple, No   JVD  CHEST/LUNG:   no     rales,     no,    rhonchi  HEART: Regular rate and rhythm;         murmur  ABDOMEN: Soft, Nontender, ;   EXTREMITIES:   no     edema  NEUROLOGY:  alert    LABS:                        13.5   5.67  )-----------( 121      ( 2021 07:08 )             40.9     07-12    131<L>  |  92<L>  |  13  ----------------------------<  246<H>  3.1<L>   |  25  |  0.78    Ca    9.2      2021 07:08  Phos  2.9     07-12  Mg     1.7     07-12    TPro  7.6  /  Alb  4.2  /  TBili  0.9  /  DBili  x   /  AST  25  /  ALT  19  /  AlkPhos  50  07-12          Urinalysis Basic - ( 2021 12:49 )    Color: Yellow / Appearance: Clear / S.019 / pH: x  Gluc: x / Ketone: Negative  / Bili: Negative / Urobili: Negative   Blood: x / Protein: 30 mg/dL / Nitrite: Negative   Leuk Esterase: Negative / RBC: 8 /hpf / WBC 1 /HPF   Sq Epi: x / Non Sq Epi: 1 /hpf / Bacteria: Negative           @ 13:35  3.5  21            GI PCR Panel, Stool (collected 21 @ 16:19)  Source: .Stool Feces  Final Report (21 @ 22:59):    Campylobacter species    DETECTED by PCR    *******Please Note:*******    GI panel PCR evaluates for:    Campylobacter, Plesiomonas shigelloides, Salmonella,    Vibrio, Yersinia enterocolitica, Enteroaggregative    Escherichia coli (EAEC), Enteropathogenic E.coli (EPEC),    Enterotoxigenic E. coli (ETEC) lt/st, Shiga-like    toxin-producing E. coli (STEC) stx1/stx2,    Shigella/ Enteroinvasive E. coli (EIEC), Cryptosporidium,    Cyclospora cayetanensis, Entamoeba histolytica,    Giardia lamblia, Adenovirus F 40/41, Astrovirus,    Norovirus GI/GII, Rotavirus A, Sapovirus        Consultant(s) Notes Reviewed:      Care Discussed with Consultants/Other Providers:

## 2021-07-13 NOTE — PROGRESS NOTE ADULT - SUBJECTIVE AND OBJECTIVE BOX
CARDIOLOGY     PROGRESS  NOTE   ________________________________________________    CHIEF COMPLAINT:Patient is a 55y old  Female who presents with a chief complaint of Fevers (12 Jul 2021 16:17)  no complain.  	  REVIEW OF SYSTEMS:  CONSTITUTIONAL: No fever, weight loss, or fatigue  EYES: No eye pain, visual disturbances, or discharge  ENT:  No difficulty hearing, tinnitus, vertigo; No sinus or throat pain  NECK: No pain or stiffness  RESPIRATORY: No cough, wheezing, chills or hemoptysis; No Shortness of Breath  CARDIOVASCULAR: No chest pain, palpitations, passing out, dizziness, or leg swelling  GASTROINTESTINAL: No abdominal or epigastric pain. No nausea, vomiting, or hematemesis; No diarrhea or constipation. No melena or hematochezia.  GENITOURINARY: No dysuria, frequency, hematuria, or incontinence  NEUROLOGICAL: No headaches, memory loss, loss of strength, numbness, or tremors  SKIN: No itching, burning, rashes, or lesions   LYMPH Nodes: No enlarged glands  ENDOCRINE: No heat or cold intolerance; No hair loss  MUSCULOSKELETAL: No joint pain or swelling; No muscle, back, or extremity pain  PSYCHIATRIC: No depression, anxiety, mood swings, or difficulty sleeping  HEME/LYMPH: No easy bruising, or bleeding gums  ALLERGY AND IMMUNOLOGIC: No hives or eczema	    [ ] All others negative	  [ ] Unable to obtain    PHYSICAL EXAM:  T(C): 37.2 (07-13-21 @ 04:53), Max: 37.8 (07-12-21 @ 14:00)  HR: 67 (07-13-21 @ 04:53) (67 - 93)  BP: 119/73 (07-13-21 @ 04:53) (119/73 - 146/81)  RR: 18 (07-13-21 @ 04:53) (18 - 18)  SpO2: 98% (07-13-21 @ 04:53) (94% - 98%)  Wt(kg): --  I&O's Summary    12 Jul 2021 07:01  -  13 Jul 2021 07:00  --------------------------------------------------------  IN: 240 mL / OUT: 0 mL / NET: 240 mL        Appearance: Normal	  HEENT:   Normal oral mucosa, PERRL, EOMI	  Lymphatic: No lymphadenopathy  Cardiovascular: Normal S1 S2, No JVD, + murmurs, No edema  Respiratory: Lungs clear to auscultation	  Psychiatry: A & O x 3, Mood & affect appropriate  Gastrointestinal:  Soft, Non-tender, + BS	  Skin: No rashes, No ecchymoses, No cyanosis	  Neurologic: Non-focal  Extremities: Normal range of motion, No clubbing, cyanosis or edema  Vascular: Peripheral pulses palpable 2+ bilaterally    MEDICATIONS  (STANDING):  amLODIPine   Tablet 10 milliGRAM(s) Oral daily  atorvastatin 20 milliGRAM(s) Oral at bedtime  dextrose 40% Gel 15 Gram(s) Oral once  dextrose 5%. 1000 milliLiter(s) (50 mL/Hr) IV Continuous <Continuous>  dextrose 5%. 1000 milliLiter(s) (100 mL/Hr) IV Continuous <Continuous>  dextrose 50% Injectable 25 Gram(s) IV Push once  dextrose 50% Injectable 12.5 Gram(s) IV Push once  dextrose 50% Injectable 25 Gram(s) IV Push once  FLUoxetine 10 milliGRAM(s) Oral daily  glucagon  Injectable 1 milliGRAM(s) IntraMuscular once  glycopyrrolate 9 MICROgram(s)/formoterol 4.8 MICROgram(s) Inhaler 2 Puff(s) Inhalation two times a day  hydrochlorothiazide 12.5 milliGRAM(s) Oral daily  insulin lispro (ADMELOG) corrective regimen sliding scale   SubCutaneous three times a day before meals  insulin lispro (ADMELOG) corrective regimen sliding scale   SubCutaneous at bedtime  levothyroxine 50 MICROGram(s) Oral daily  losartan 100 milliGRAM(s) Oral daily  metroNIDAZOLE  IVPB 500 milliGRAM(s) IV Intermittent every 8 hours  montelukast 10 milliGRAM(s) Oral daily  piperacillin/tazobactam IVPB.. 3.375 Gram(s) IV Intermittent every 8 hours      TELEMETRY: 	    ECG:  	  RADIOLOGY:  OTHER: 	  	  LABS:	 	    CARDIAC MARKERS:                                13.5   5.67  )-----------( 121      ( 12 Jul 2021 07:08 )             40.9     07-12    131<L>  |  92<L>  |  13  ----------------------------<  246<H>  3.1<L>   |  25  |  0.78    Ca    9.2      12 Jul 2021 07:08  Phos  2.9     07-12  Mg     1.7     07-12    TPro  7.6  /  Alb  4.2  /  TBili  0.9  /  DBili  x   /  AST  25  /  ALT  19  /  AlkPhos  50  07-12    proBNP:   Lipid Profile:   HgA1c:   TSH:     < from: CT Chest No Cont (07.12.21 @ 12:45) >  IMPRESSION: Multiple lymph nodes are present in the mediastinum and supraclavicular region bilaterally as described above. Exact etiology is unclear. One of the differential diagnostic consideration includes lymphoma.    Very small pericardial effusion.    < from: 12 Lead ECG (07.11.21 @ 11:09) >  Diagnosis Line NORMAL SINUS RHYTHM  POSSIBLE LEFT ATRIAL ENLARGEMENT  LEFT ANTERIOR FASCICULAR BLOCK  PROLONGED QT  ABNORMAL ECG  NO PREVIOUS ECGS AVAILABLE    1) Fever  Pt appears to have colitis on CT and clinically with diarrhea  check stool - GI- PCR , check C diff  follow lactate  GI is seeing   unclear how the colitis is related to the procedure  check bc x 2 sets  continue abs for now    2) Lymphadenopathy/ splenomegally  check LDH  check DWAYNE  check EBV serology, check toxo serology  HIV is negative  RVP/SARS CoV2 - negative   ?PET scan  check ACE  check quanteferon  will likely need LN biopsy    3? 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 Patient states that after the D&C on Friday she was recovering appropriately, tolerating PO, ambulating, mild abdominal cramping, no vaginal discharge or vaginal bleeding. Yesterday evening, she noted a fever of 103. She had some relief with Tylenol and cold compresses. This morning she was febrile again and presented to the ED. She reports associated nausea, urinary frequency and urgency. No vomiting or diarrhea. Also reporting diffuse fatigue, frontal pressure-like headache and neck ache, improved with Tylenol. Denies any CP, SOB, cough, focal numbness, tingling, weakness, n/v/d, sick contact.   Patient is UTD on all cancer screenings, colonoscopy 4 years ago wnl, mammograms and Pap Smears also benign.   On arrival to the ED, patient febrile to 103.2, , -167/57-81, RR 16-22 O2 93-98% on RA. CBC with no leukocytosis, platelets 143, ESR/CRP 48/38, lactate 1.8, UA clean, RVP negative, CT A/P w/ colitis of cecum and proximal ascending colon, prominent left adnexa, splenomegaly and extensive lymphadenopathy, indeterminate partially imaged fat density right pericardial structure. Transvaginal ultrasound reveals echogenic material within the endometrium. CXR unrevealing.   pt with s/p recent GYN surgery with intermittent chest pain last few months , last stress test years ago  ecg  will adjust bp meds, over all well controll, continue current med  check lipid  echo/ ct chest to evaluate pericardial abnormality/ ct noted ?lymphoma  pt needs ischemia work up with sig cardiac risk factor not now  DVT prophylaxis  continue iv bax  GYN follow up  replete K  ID noted  LN biopsy

## 2021-07-13 NOTE — PROGRESS NOTE ADULT - SUBJECTIVE AND OBJECTIVE BOX
[facility]  [place] [address]  [phone]      INTERVAL HPI/OVERNIGHT EVENTS:  patient seen and evaluated  denies pain, N/V  reports 3-4 episodes of watery diarrhea over night  tolerating diet     MEDICATIONS  (STANDING):  amLODIPine   Tablet 10 milliGRAM(s) Oral daily  atorvastatin 20 milliGRAM(s) Oral at bedtime  dextrose 40% Gel 15 Gram(s) Oral once  dextrose 5%. 1000 milliLiter(s) (50 mL/Hr) IV Continuous <Continuous>  dextrose 5%. 1000 milliLiter(s) (100 mL/Hr) IV Continuous <Continuous>  dextrose 50% Injectable 25 Gram(s) IV Push once  dextrose 50% Injectable 12.5 Gram(s) IV Push once  dextrose 50% Injectable 25 Gram(s) IV Push once  FLUoxetine 10 milliGRAM(s) Oral daily  glucagon  Injectable 1 milliGRAM(s) IntraMuscular once  glycopyrrolate 9 MICROgram(s)/formoterol 4.8 MICROgram(s) Inhaler 2 Puff(s) Inhalation two times a day  hydrochlorothiazide 12.5 milliGRAM(s) Oral daily  insulin lispro (ADMELOG) corrective regimen sliding scale   SubCutaneous three times a day before meals  insulin lispro (ADMELOG) corrective regimen sliding scale   SubCutaneous at bedtime  levothyroxine 50 MICROGram(s) Oral daily  losartan 100 milliGRAM(s) Oral daily  metroNIDAZOLE  IVPB 500 milliGRAM(s) IV Intermittent every 8 hours  montelukast 10 milliGRAM(s) Oral daily  piperacillin/tazobactam IVPB.. 3.375 Gram(s) IV Intermittent every 8 hours    MEDICATIONS  (PRN):  acetaminophen   Tablet .. 650 milliGRAM(s) Oral every 6 hours PRN Mild Pain (1 - 3)      Allergies    No Known Allergies    Intolerances    amoxicillin (Nausea)  Avelox (Other)      ROS:   General:  No wt loss, fevers, chills, night sweats, fatigue,   Eyes:  Good vision, no reported pain  ENT:  No sore throat, pain, runny nose, dysphagia  CV:  No pain, palpitations, hypo/hypertension  Resp:  No dyspnea, cough, tachypnea, wheezing  GI:  No pain, No nausea, No vomiting, No diarrhea, No constipation, No weight loss, No fever, No pruritis, No rectal bleeding, No tarry stools, No dysphagia,  :  No pain, bleeding, incontinence, nocturia  Muscle:  No pain, weakness  Neuro:  No weakness, tingling, memory problems  Psych:  No fatigue, insomnia, mood problems, depression  Endocrine:  No polyuria, polydipsia, cold/heat intolerance  Heme:  No petechiae, ecchymosis, easy bruisability  Skin:  No rash, tattoos, scars, edema      PHYSICAL EXAM:   Vital Signs:  Vital Signs Last 24 Hrs  T(C): 37.2 (2021 04:53), Max: 37.8 (2021 14:00)  T(F): 98.9 (2021 04:53), Max: 100.1 (2021 14:00)  HR: 67 (2021 04:53) (67 - 93)  BP: 119/73 (2021 04:53) (119/73 - 138/70)  BP(mean): --  RR: 18 (2021 04:53) (18 - 18)  SpO2: 98% (2021 04:53) (94% - 98%)  Daily     Daily     GENERAL:  Appears stated age,   HEENT:  NC/AT,    CHEST:  Full & symmetric excursion,   HEART:  Regular rhythm,  ABDOMEN:  Soft, non-tender, non-distended,  EXTEREMITIES:  no cyanosis  SKIN:  No rash  NEURO:  Alert,       LABS:                        13.5   5.67  )-----------( 121      ( 2021 07:08 )             40.9     07-12    131<L>  |  92<L>  |  13  ----------------------------<  246<H>  3.1<L>   |  25  |  0.78    Ca    9.2      2021 07:08  Phos  2.9     07-12  Mg     1.7     07-12    TPro  7.6  /  Alb  4.2  /  TBili  0.9  /  DBili  x   /  AST  25  /  ALT  19  /  AlkPhos  50  07-12      Urinalysis Basic - ( 2021 12:49 )    Color: Yellow / Appearance: Clear / S.019 / pH: x  Gluc: x / Ketone: Negative  / Bili: Negative / Urobili: Negative   Blood: x / Protein: 30 mg/dL / Nitrite: Negative   Leuk Esterase: Negative / RBC: 8 /hpf / WBC 1 /HPF   Sq Epi: x / Non Sq Epi: 1 /hpf / Bacteria: Negative        RADIOLOGY & ADDITIONAL TESTS:    Culture Results:   Campylobacter species  DETECTED by PCR  *******Please Note:*******  GI panel PCR evaluates for:  Campylobacter, Plesiomonas shigelloides, Salmonella,  Vibrio, Yersinia enterocolitica, Enteroaggregative  Escherichia coli (EAEC), Enteropathogenic E.coli (EPEC),  Enterotoxigenic E. coli (ETEC) lt/st, Shiga-like  toxin-producing E. coli (STEC) stx1/stx2,  Shigella/ Enteroinvasive E. coli (EIEC), Cryptosporidium,  Cyclospora cayetanensis, Entamoeba histolytica,  Giardia lamblia, Adenovirus F 40/41, Astrovirus,  Norovirus GI/GII, Rotavirus A, Sapovirus (21 @ 16:19)

## 2021-07-14 ENCOUNTER — TRANSCRIPTION ENCOUNTER (OUTPATIENT)
Age: 56
End: 2021-07-14

## 2021-07-14 VITALS
RESPIRATION RATE: 18 BRPM | OXYGEN SATURATION: 96 % | DIASTOLIC BLOOD PRESSURE: 74 MMHG | SYSTOLIC BLOOD PRESSURE: 126 MMHG | TEMPERATURE: 99 F | HEART RATE: 74 BPM

## 2021-07-14 LAB
ANION GAP SERPL CALC-SCNC: 11 MMOL/L — SIGNIFICANT CHANGE UP (ref 5–17)
BUN SERPL-MCNC: 16 MG/DL — SIGNIFICANT CHANGE UP (ref 7–23)
CALCIUM SERPL-MCNC: 8.8 MG/DL — SIGNIFICANT CHANGE UP (ref 8.4–10.5)
CHLORIDE SERPL-SCNC: 102 MMOL/L — SIGNIFICANT CHANGE UP (ref 96–108)
CO2 SERPL-SCNC: 24 MMOL/L — SIGNIFICANT CHANGE UP (ref 22–31)
CREAT SERPL-MCNC: 0.65 MG/DL — SIGNIFICANT CHANGE UP (ref 0.5–1.3)
CULTURE RESULTS: SIGNIFICANT CHANGE UP
CULTURE RESULTS: SIGNIFICANT CHANGE UP
EBV PATRN SPEC IB-IMP: SIGNIFICANT CHANGE UP
GLUCOSE BLDC GLUCOMTR-MCNC: 259 MG/DL — HIGH (ref 70–99)
GLUCOSE BLDC GLUCOMTR-MCNC: 282 MG/DL — HIGH (ref 70–99)
GLUCOSE SERPL-MCNC: 253 MG/DL — HIGH (ref 70–99)
POTASSIUM SERPL-MCNC: 3.9 MMOL/L — SIGNIFICANT CHANGE UP (ref 3.5–5.3)
POTASSIUM SERPL-SCNC: 3.9 MMOL/L — SIGNIFICANT CHANGE UP (ref 3.5–5.3)
SODIUM SERPL-SCNC: 137 MMOL/L — SIGNIFICANT CHANGE UP (ref 135–145)
SPECIMEN SOURCE: SIGNIFICANT CHANGE UP
SPECIMEN SOURCE: SIGNIFICANT CHANGE UP
T GONDII IGG SER QL: <3 IU/ML — SIGNIFICANT CHANGE UP
T GONDII IGG SER QL: NEGATIVE — SIGNIFICANT CHANGE UP
T GONDII IGM SER QL: <3 AU/ML — SIGNIFICANT CHANGE UP
T GONDII IGM SER QL: NEGATIVE — SIGNIFICANT CHANGE UP

## 2021-07-14 PROCEDURE — 82565 ASSAY OF CREATININE: CPT

## 2021-07-14 PROCEDURE — 83735 ASSAY OF MAGNESIUM: CPT

## 2021-07-14 PROCEDURE — 82330 ASSAY OF CALCIUM: CPT

## 2021-07-14 PROCEDURE — 71250 CT THORAX DX C-: CPT

## 2021-07-14 PROCEDURE — 80053 COMPREHEN METABOLIC PANEL: CPT

## 2021-07-14 PROCEDURE — 81001 URINALYSIS AUTO W/SCOPE: CPT

## 2021-07-14 PROCEDURE — 82435 ASSAY OF BLOOD CHLORIDE: CPT

## 2021-07-14 PROCEDURE — 86665 EPSTEIN-BARR CAPSID VCA: CPT

## 2021-07-14 PROCEDURE — 86663 EPSTEIN-BARR ANTIBODY: CPT

## 2021-07-14 PROCEDURE — 85652 RBC SED RATE AUTOMATED: CPT

## 2021-07-14 PROCEDURE — 86140 C-REACTIVE PROTEIN: CPT

## 2021-07-14 PROCEDURE — 84550 ASSAY OF BLOOD/URIC ACID: CPT

## 2021-07-14 PROCEDURE — 85014 HEMATOCRIT: CPT

## 2021-07-14 PROCEDURE — 82947 ASSAY GLUCOSE BLOOD QUANT: CPT

## 2021-07-14 PROCEDURE — 82164 ANGIOTENSIN I ENZYME TEST: CPT

## 2021-07-14 PROCEDURE — 86778 TOXOPLASMA ANTIBODY IGM: CPT

## 2021-07-14 PROCEDURE — 86803 HEPATITIS C AB TEST: CPT

## 2021-07-14 PROCEDURE — 71045 X-RAY EXAM CHEST 1 VIEW: CPT

## 2021-07-14 PROCEDURE — 82962 GLUCOSE BLOOD TEST: CPT

## 2021-07-14 PROCEDURE — 85018 HEMOGLOBIN: CPT

## 2021-07-14 PROCEDURE — 84295 ASSAY OF SERUM SODIUM: CPT

## 2021-07-14 PROCEDURE — 87389 HIV-1 AG W/HIV-1&-2 AB AG IA: CPT

## 2021-07-14 PROCEDURE — 86480 TB TEST CELL IMMUN MEASURE: CPT

## 2021-07-14 PROCEDURE — 0225U NFCT DS DNA&RNA 21 SARSCOV2: CPT

## 2021-07-14 PROCEDURE — 82803 BLOOD GASES ANY COMBINATION: CPT

## 2021-07-14 PROCEDURE — 86038 ANTINUCLEAR ANTIBODIES: CPT

## 2021-07-14 PROCEDURE — 87086 URINE CULTURE/COLONY COUNT: CPT

## 2021-07-14 PROCEDURE — 87046 STOOL CULTR AEROBIC BACT EA: CPT

## 2021-07-14 PROCEDURE — 76536 US EXAM OF HEAD AND NECK: CPT

## 2021-07-14 PROCEDURE — 83036 HEMOGLOBIN GLYCOSYLATED A1C: CPT

## 2021-07-14 PROCEDURE — 93306 TTE W/DOPPLER COMPLETE: CPT

## 2021-07-14 PROCEDURE — 74177 CT ABD & PELVIS W/CONTRAST: CPT

## 2021-07-14 PROCEDURE — 87177 OVA AND PARASITES SMEARS: CPT

## 2021-07-14 PROCEDURE — 86777 TOXOPLASMA ANTIBODY: CPT

## 2021-07-14 PROCEDURE — 87507 IADNA-DNA/RNA PROBE TQ 12-25: CPT

## 2021-07-14 PROCEDURE — 86769 SARS-COV-2 COVID-19 ANTIBODY: CPT

## 2021-07-14 PROCEDURE — 76830 TRANSVAGINAL US NON-OB: CPT

## 2021-07-14 PROCEDURE — U0005: CPT

## 2021-07-14 PROCEDURE — 86664 EPSTEIN-BARR NUCLEAR ANTIGEN: CPT

## 2021-07-14 PROCEDURE — 87040 BLOOD CULTURE FOR BACTERIA: CPT

## 2021-07-14 PROCEDURE — 87491 CHLMYD TRACH DNA AMP PROBE: CPT

## 2021-07-14 PROCEDURE — 94640 AIRWAY INHALATION TREATMENT: CPT

## 2021-07-14 PROCEDURE — 84132 ASSAY OF SERUM POTASSIUM: CPT

## 2021-07-14 PROCEDURE — 87591 N.GONORRHOEAE DNA AMP PROB: CPT

## 2021-07-14 PROCEDURE — 87045 FECES CULTURE AEROBIC BACT: CPT

## 2021-07-14 PROCEDURE — 76856 US EXAM PELVIC COMPLETE: CPT

## 2021-07-14 PROCEDURE — 87077 CULTURE AEROBIC IDENTIFY: CPT

## 2021-07-14 PROCEDURE — U0003: CPT

## 2021-07-14 PROCEDURE — 84100 ASSAY OF PHOSPHORUS: CPT

## 2021-07-14 PROCEDURE — 99285 EMERGENCY DEPT VISIT HI MDM: CPT

## 2021-07-14 PROCEDURE — 80048 BASIC METABOLIC PNL TOTAL CA: CPT

## 2021-07-14 PROCEDURE — 85025 COMPLETE CBC W/AUTO DIFF WBC: CPT

## 2021-07-14 PROCEDURE — 83605 ASSAY OF LACTIC ACID: CPT

## 2021-07-14 PROCEDURE — 83615 LACTATE (LD) (LDH) ENZYME: CPT

## 2021-07-14 RX ORDER — LOSARTAN/HYDROCHLOROTHIAZIDE 100MG-25MG
1 TABLET ORAL
Qty: 0 | Refills: 0 | DISCHARGE

## 2021-07-14 RX ORDER — AZITHROMYCIN 500 MG/1
1 TABLET, FILM COATED ORAL
Qty: 5 | Refills: 0
Start: 2021-07-14 | End: 2021-07-18

## 2021-07-14 RX ORDER — ASPIRIN/CALCIUM CARB/MAGNESIUM 324 MG
1 TABLET ORAL
Qty: 0 | Refills: 0 | DISCHARGE

## 2021-07-14 RX ORDER — ACETAMINOPHEN 500 MG
2 TABLET ORAL
Qty: 0 | Refills: 0 | DISCHARGE

## 2021-07-14 RX ORDER — IBUPROFEN 200 MG
3 TABLET ORAL
Qty: 0 | Refills: 0 | DISCHARGE

## 2021-07-14 RX ADMIN — AZITHROMYCIN 500 MILLIGRAM(S): 500 TABLET, FILM COATED ORAL at 12:26

## 2021-07-14 RX ADMIN — MONTELUKAST 10 MILLIGRAM(S): 4 TABLET, CHEWABLE ORAL at 12:26

## 2021-07-14 RX ADMIN — AMLODIPINE BESYLATE 10 MILLIGRAM(S): 2.5 TABLET ORAL at 05:50

## 2021-07-14 RX ADMIN — Medication 3: at 09:04

## 2021-07-14 RX ADMIN — Medication 50 MICROGRAM(S): at 05:51

## 2021-07-14 RX ADMIN — Medication 3: at 13:00

## 2021-07-14 RX ADMIN — Medication 1 PACKET(S): at 09:04

## 2021-07-14 RX ADMIN — Medication 12.5 MILLIGRAM(S): at 05:51

## 2021-07-14 RX ADMIN — Medication 10 MILLIGRAM(S): at 12:26

## 2021-07-14 RX ADMIN — LOSARTAN POTASSIUM 100 MILLIGRAM(S): 100 TABLET, FILM COATED ORAL at 05:50

## 2021-07-14 NOTE — PROGRESS NOTE ADULT - SUBJECTIVE AND OBJECTIVE BOX
[facility]  [place] [address]  [phone]      INTERVAL HPI/OVERNIGHT EVENTS:  patient seen and evaluated  denies pain, N/V  reports diarrhea improving   tolerating diet     MEDICATIONS  (STANDING):  amLODIPine   Tablet 10 milliGRAM(s) Oral daily  atorvastatin 20 milliGRAM(s) Oral at bedtime  dextrose 40% Gel 15 Gram(s) Oral once  dextrose 5%. 1000 milliLiter(s) (50 mL/Hr) IV Continuous <Continuous>  dextrose 5%. 1000 milliLiter(s) (100 mL/Hr) IV Continuous <Continuous>  dextrose 50% Injectable 25 Gram(s) IV Push once  dextrose 50% Injectable 12.5 Gram(s) IV Push once  dextrose 50% Injectable 25 Gram(s) IV Push once  FLUoxetine 10 milliGRAM(s) Oral daily  glucagon  Injectable 1 milliGRAM(s) IntraMuscular once  glycopyrrolate 9 MICROgram(s)/formoterol 4.8 MICROgram(s) Inhaler 2 Puff(s) Inhalation two times a day  hydrochlorothiazide 12.5 milliGRAM(s) Oral daily  insulin lispro (ADMELOG) corrective regimen sliding scale   SubCutaneous three times a day before meals  insulin lispro (ADMELOG) corrective regimen sliding scale   SubCutaneous at bedtime  levothyroxine 50 MICROGram(s) Oral daily  losartan 100 milliGRAM(s) Oral daily  metroNIDAZOLE  IVPB 500 milliGRAM(s) IV Intermittent every 8 hours  montelukast 10 milliGRAM(s) Oral daily  piperacillin/tazobactam IVPB.. 3.375 Gram(s) IV Intermittent every 8 hours    MEDICATIONS  (PRN):  acetaminophen   Tablet .. 650 milliGRAM(s) Oral every 6 hours PRN Mild Pain (1 - 3)      Allergies    No Known Allergies    Intolerances    amoxicillin (Nausea)  Avelox (Other)      ROS:   General:  No wt loss, fevers, chills, night sweats, fatigue,   Eyes:  Good vision, no reported pain  ENT:  No sore throat, pain, runny nose, dysphagia  CV:  No pain, palpitations, hypo/hypertension  Resp:  No dyspnea, cough, tachypnea, wheezing  GI:  No pain, No nausea, No vomiting, No diarrhea, No constipation, No weight loss, No fever, No pruritis, No rectal bleeding, No tarry stools, No dysphagia,  :  No pain, bleeding, incontinence, nocturia  Muscle:  No pain, weakness  Neuro:  No weakness, tingling, memory problems  Psych:  No fatigue, insomnia, mood problems, depression  Endocrine:  No polyuria, polydipsia, cold/heat intolerance  Heme:  No petechiae, ecchymosis, easy bruisability  Skin:  No rash, tattoos, scars, edema      PHYSICAL EXAM:   Vital Signs:  Vital Signs Last 24 Hrs  T(C): 37.2 (2021 04:53), Max: 37.8 (2021 14:00)  T(F): 98.9 (2021 04:53), Max: 100.1 (2021 14:00)  HR: 67 (2021 04:53) (67 - 93)  BP: 119/73 (2021 04:53) (119/73 - 138/70)  BP(mean): --  RR: 18 (2021 04:53) (18 - 18)  SpO2: 98% (2021 04:53) (94% - 98%)  Daily     Daily     GENERAL:  Appears stated age,   HEENT:  NC/AT,    CHEST:  Full & symmetric excursion,   HEART:  Regular rhythm,  ABDOMEN:  Soft, non-tender, non-distended,  EXTEREMITIES:  no cyanosis  SKIN:  No rash  NEURO:  Alert,       LABS:                        13.5   5.67  )-----------( 121      ( 2021 07:08 )             40.9     07-12    131<L>  |  92<L>  |  13  ----------------------------<  246<H>  3.1<L>   |  25  |  0.78    Ca    9.2      2021 07:08  Phos  2.9     07-12  Mg     1.7     07-12    TPro  7.6  /  Alb  4.2  /  TBili  0.9  /  DBili  x   /  AST  25  /  ALT  19  /  AlkPhos  50  07-12      Urinalysis Basic - ( 2021 12:49 )    Color: Yellow / Appearance: Clear / S.019 / pH: x  Gluc: x / Ketone: Negative  / Bili: Negative / Urobili: Negative   Blood: x / Protein: 30 mg/dL / Nitrite: Negative   Leuk Esterase: Negative / RBC: 8 /hpf / WBC 1 /HPF   Sq Epi: x / Non Sq Epi: 1 /hpf / Bacteria: Negative        RADIOLOGY & ADDITIONAL TESTS:    Culture Results:   Campylobacter species  DETECTED by PCR  *******Please Note:*******  GI panel PCR evaluates for:  Campylobacter, Plesiomonas shigelloides, Salmonella,  Vibrio, Yersinia enterocolitica, Enteroaggregative  Escherichia coli (EAEC), Enteropathogenic E.coli (EPEC),  Enterotoxigenic E. coli (ETEC) lt/st, Shiga-like  toxin-producing E. coli (STEC) stx1/stx2,  Shigella/ Enteroinvasive E. coli (EIEC), Cryptosporidium,  Cyclospora cayetanensis, Entamoeba histolytica,  Giardia lamblia, Adenovirus F 40/41, Astrovirus,  Norovirus GI/GII, Rotavirus A, Sapovirus (21 @ 16:19)

## 2021-07-14 NOTE — PROGRESS NOTE ADULT - SUBJECTIVE AND OBJECTIVE BOX
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 Mild abdominal cramping, no vaginal discharge or vaginal bleeding. She had associated nausea, urinary frequency and urgency. Patient is UTD on all cancer screenings, colonoscopy 4 years ago wnl, mammograms and Pap Smears also benign. CT A/P w/ colitis of cecum and proximal ascending colon, prominent left adnexa, splenomegaly and extensive lymphadenopathy, indeterminate partially imaged fat density right pericardial structure. Transvaginal ultrasound reveals echogenic material within the endometrium. CXR unrevealing.   Multiple lymphnodes are present in the supraclavicular region bilaterally.  Multiple lymph nodes are present in the anterior mediastinum, left internal mammary chain, pretracheal space, AP window and in the right cardiophrenic angle. One of the largest lymph node is noted in the anterior mediastinum and measures 2 x 2 centimeters. Heart is normal in size. Calcification of the coronary arteries is noted. Very small pericardial effusion is noted.  PAST MEDICAL & SURGICAL HISTORY:  HTN (hypertension)    Diabetes    Depression    Hypothyroid    Asthma  well controlled    Hypokalemia    Morbidly obese    AQUILES (obstructive sleep apnea)    Hyperlipidemia    S/P tonsillectomy    S/P laparoscopic sleeve gastrectomy  2018    History of ectopic pregnancy      Medications:  acetaminophen   Tablet .. 650 milliGRAM(s) Oral every 6 hours PRN Mild Pain (1 - 3)  amLODIPine   Tablet 10 milliGRAM(s) Oral daily  atorvastatin 20 milliGRAM(s) Oral at bedtime  azithromycin   Tablet 500 milliGRAM(s) Oral daily  dextrose 40% Gel 15 Gram(s) Oral once  dextrose 5%. 1000 milliLiter(s) IV Continuous <Continuous>  dextrose 5%. 1000 milliLiter(s) IV Continuous <Continuous>  dextrose 50% Injectable 25 Gram(s) IV Push once  dextrose 50% Injectable 12.5 Gram(s) IV Push once  dextrose 50% Injectable 25 Gram(s) IV Push once  FLUoxetine 10 milliGRAM(s) Oral daily  glucagon  Injectable 1 milliGRAM(s) IntraMuscular once  glycopyrrolate 9 MICROgram(s)/formoterol 4.8 MICROgram(s) Inhaler 2 Puff(s) Inhalation two times a day  hydrochlorothiazide 12.5 milliGRAM(s) Oral daily  insulin lispro (ADMELOG) corrective regimen sliding scale   SubCutaneous three times a day before meals  insulin lispro (ADMELOG) corrective regimen sliding scale   SubCutaneous at bedtime  levothyroxine 50 MICROGram(s) Oral daily  losartan 100 milliGRAM(s) Oral daily  montelukast 10 milliGRAM(s) Oral daily    Labs:    07-14    137  |  102  |  16  ----------------------------<  253<H>  3.9   |  24  |  0.65    Ca    8.8      14 Jul 2021 06:46  Phos  2.6     07-13  Mg     1.9     07-13        Radiology:             ROS:  Patient comfortable without distress  No SOB or chest pain  No palpitation  No abdominal pain, diarrhaea or constipation  No weakness of extremities  No skin changes or swelling of legs    Vital Signs Last 24 Hrs  T(C): 36.8 (14 Jul 2021 05:45), Max: 37.5 (13 Jul 2021 12:03)  T(F): 98.2 (14 Jul 2021 05:45), Max: 99.5 (13 Jul 2021 12:03)  HR: 63 (14 Jul 2021 05:45) (63 - 76)  BP: 130/78 (14 Jul 2021 05:45) (97/61 - 137/80)  BP(mean): --  RR: 18 (14 Jul 2021 05:45) (18 - 18)  SpO2: 97% (14 Jul 2021 05:45) (95% - 97%)    Physical exam:  Patient alert and oriented  No distress, obese  CVS: S1, S2 regular or murmur  Chest: bilateral breath sound without rales  Abdomen: soft, not tender, no organomegaly or masses  No focal neuro deficit  No edema      Assessment and Plan: 54 y/o postmenopausal female with PMHx of T2DM (A1c 7.6% 2021), morbid obesity s/p vertical sleeve gastrectomy in 2018, HTN, HLD, morbid obesity, AQUILES not on CPAP, depression, mild asthma, 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 Mild abdominal cramping, no vaginal discharge or vaginal bleeding. She had associated nausea, urinary frequency and urgency. Patient is UTD on all cancer screenings, colonoscopy 4 years ago wnl, mammograms and Pap Smears also benign. CT A/P w/ colitis of cecum and proximal ascending colon, prominent left adnexa, splenomegaly and extensive lymphadenopathy, indeterminate partially imaged fat density right pericardial structure. Transvaginal ultrasound reveals echogenic material within the endometrium. CXR unrevealing.   Multiple lymphnodes are present in the supraclavicular region bilaterally.  Multiple lymph nodes are present in the anterior mediastinum, left internal mammary chain, pretracheal space, AP window and in the right cardiophrenic angle. One of the largest lymph node is noted in the anterior mediastinum and measures 2 x 2 centimeters. Heart is normal in size. Calcification of the coronary arteries is noted. Very small pericardial effusion is noted.  PAST MEDICAL & SURGICAL HISTORY:  HTN (hypertension)    Diabetes    Depression    Hypothyroid    Asthma  well controlled    Hypokalemia    Morbidly obese    AQUILES (obstructive sleep apnea)    Hyperlipidemia    S/P tonsillectomy    S/P laparoscopic sleeve gastrectomy  2018    History of ectopic pregnancy      Medications:  acetaminophen   Tablet .. 650 milliGRAM(s) Oral every 6 hours PRN Mild Pain (1 - 3)  amLODIPine   Tablet 10 milliGRAM(s) Oral daily  atorvastatin 20 milliGRAM(s) Oral at bedtime  azithromycin   Tablet 500 milliGRAM(s) Oral daily  dextrose 40% Gel 15 Gram(s) Oral once  dextrose 5%. 1000 milliLiter(s) IV Continuous <Continuous>  dextrose 5%. 1000 milliLiter(s) IV Continuous <Continuous>  dextrose 50% Injectable 25 Gram(s) IV Push once  dextrose 50% Injectable 12.5 Gram(s) IV Push once  dextrose 50% Injectable 25 Gram(s) IV Push once  FLUoxetine 10 milliGRAM(s) Oral daily  glucagon  Injectable 1 milliGRAM(s) IntraMuscular once  glycopyrrolate 9 MICROgram(s)/formoterol 4.8 MICROgram(s) Inhaler 2 Puff(s) Inhalation two times a day  hydrochlorothiazide 12.5 milliGRAM(s) Oral daily  insulin lispro (ADMELOG) corrective regimen sliding scale   SubCutaneous three times a day before meals  insulin lispro (ADMELOG) corrective regimen sliding scale   SubCutaneous at bedtime  levothyroxine 50 MICROGram(s) Oral daily  losartan 100 milliGRAM(s) Oral daily  montelukast 10 milliGRAM(s) Oral daily    Labs:    07-14    137  |  102  |  16  ----------------------------<  253<H>  3.9   |  24  |  0.65    Ca    8.8      14 Jul 2021 06:46  Phos  2.6     07-13  Mg     1.9     07-13        Radiology:             ROS:  Patient comfortable without distress  No SOB or chest pain  No palpitation  No abdominal pain, diarrhaea +  No weakness of extremities  No skin changes or swelling of legs    Vital Signs Last 24 Hrs  T(C): 36.8 (14 Jul 2021 05:45), Max: 37.5 (13 Jul 2021 12:03)  T(F): 98.2 (14 Jul 2021 05:45), Max: 99.5 (13 Jul 2021 12:03)  HR: 63 (14 Jul 2021 05:45) (63 - 76)  BP: 130/78 (14 Jul 2021 05:45) (97/61 - 137/80)  BP(mean): --  RR: 18 (14 Jul 2021 05:45) (18 - 18)  SpO2: 97% (14 Jul 2021 05:45) (95% - 97%)    Physical exam:  Patient alert and oriented  No distress, obese  CVS: S1, S2 regular or murmur  Chest: bilateral breath sound without rales  Abdomen: soft, not tender, no organomegaly or masses  No focal neuro deficit  No edema      Assessment and Plan:

## 2021-07-14 NOTE — PROGRESS NOTE ADULT - SUBJECTIVE AND OBJECTIVE BOX
CARDIOLOGY     PROGRESS  NOTE   ________________________________________________    CHIEF COMPLAINT:Patient is a 55y old  Female who presents with a chief complaint of Fevers (13 Jul 2021 21:03)  doing better.  	  REVIEW OF SYSTEMS:  CONSTITUTIONAL: No fever, weight loss, or fatigue  EYES: No eye pain, visual disturbances, or discharge  ENT:  No difficulty hearing, tinnitus, vertigo; No sinus or throat pain  NECK: No pain or stiffness  RESPIRATORY: No cough, wheezing, chills or hemoptysis; No Shortness of Breath  CARDIOVASCULAR: No chest pain, palpitations, passing out, dizziness, or leg swelling  GASTROINTESTINAL: No abdominal or epigastric pain. No nausea, vomiting, or hematemesis; No diarrhea or constipation. No melena or hematochezia.  GENITOURINARY: No dysuria, frequency, hematuria, or incontinence  NEUROLOGICAL: No headaches, memory loss, loss of strength, numbness, or tremors  SKIN: No itching, burning, rashes, or lesions   LYMPH Nodes: No enlarged glands  ENDOCRINE: No heat or cold intolerance; No hair loss  MUSCULOSKELETAL: No joint pain or swelling; No muscle, back, or extremity pain  PSYCHIATRIC: No depression, anxiety, mood swings, or difficulty sleeping  HEME/LYMPH: No easy bruising, or bleeding gums  ALLERGY AND IMMUNOLOGIC: No hives or eczema	    [ ] All others negative	  [ ] Unable to obtain    PHYSICAL EXAM:  T(C): 36.8 (07-14-21 @ 05:45), Max: 37.5 (07-13-21 @ 12:03)  HR: 63 (07-14-21 @ 05:45) (63 - 76)  BP: 130/78 (07-14-21 @ 05:45) (97/61 - 137/80)  RR: 18 (07-14-21 @ 05:45) (18 - 18)  SpO2: 97% (07-14-21 @ 05:45) (95% - 97%)  Wt(kg): --  I&O's Summary    13 Jul 2021 07:01  -  14 Jul 2021 07:00  --------------------------------------------------------  IN: 240 mL / OUT: 0 mL / NET: 240 mL        Appearance: Normal	  HEENT:   Normal oral mucosa, PERRL, EOMI	  Lymphatic: No lymphadenopathy  Cardiovascular: Normal S1 S2, No JVD, + murmurs, No edema  Respiratory: rhonchi  Psychiatry: A & O x 3, Mood & affect appropriate  Gastrointestinal:  Soft, Non-tender, + BS	  Skin: No rashes, No ecchymoses, No cyanosis	  Neurologic: Non-focal  Extremities: Normal range of motion, No clubbing, cyanosis or edema  Vascular: Peripheral pulses palpable 2+ bilaterally    MEDICATIONS  (STANDING):  amLODIPine   Tablet 10 milliGRAM(s) Oral daily  atorvastatin 20 milliGRAM(s) Oral at bedtime  azithromycin   Tablet 500 milliGRAM(s) Oral daily  dextrose 40% Gel 15 Gram(s) Oral once  dextrose 5%. 1000 milliLiter(s) (50 mL/Hr) IV Continuous <Continuous>  dextrose 5%. 1000 milliLiter(s) (100 mL/Hr) IV Continuous <Continuous>  dextrose 50% Injectable 25 Gram(s) IV Push once  dextrose 50% Injectable 12.5 Gram(s) IV Push once  dextrose 50% Injectable 25 Gram(s) IV Push once  FLUoxetine 10 milliGRAM(s) Oral daily  glucagon  Injectable 1 milliGRAM(s) IntraMuscular once  glycopyrrolate 9 MICROgram(s)/formoterol 4.8 MICROgram(s) Inhaler 2 Puff(s) Inhalation two times a day  hydrochlorothiazide 12.5 milliGRAM(s) Oral daily  insulin lispro (ADMELOG) corrective regimen sliding scale   SubCutaneous three times a day before meals  insulin lispro (ADMELOG) corrective regimen sliding scale   SubCutaneous at bedtime  levothyroxine 50 MICROGram(s) Oral daily  losartan 100 milliGRAM(s) Oral daily  montelukast 10 milliGRAM(s) Oral daily  potassium phosphate / sodium phosphate Powder (PHOS-NaK) 1 Packet(s) Oral three times a day with meals      TELEMETRY: 	    ECG:  	  RADIOLOGY:  OTHER: 	  	  LABS:	 	    CARDIAC MARKERS:            07-14    137  |  102  |  16  ----------------------------<  253<H>  3.9   |  24  |  0.65    Ca    8.8      14 Jul 2021 06:46  Phos  2.6     07-13  Mg     1.9     07-13      proBNP:   Lipid Profile:   HgA1c:   TSH:   < from: Transthoracic Echocardiogram (07.13.21 @ 15:17) >  Mitral Valve: Mitral annular calcification.  Aortic Valve/Aorta: Calcified aortic valve with normal  opening.  Normal aortic root size.  Left Atrium: Mild left atrial enlargement.  Left Ventricle: Normal left ventricular internal  dimensions. Mild-moderate concentric hypertrophy.  Normal left ventricular systolic function. No segmental  wall motion abnormalities.  Right Heart: Normal right atrium. Normal right ventricular  size and function.  Normal tricuspid valve. Normal pulmonic valve.  Pericardium/Pleura: Trace pericardial effusion.  Hemodynamic: Estimated right atrial pressure is normal.  No evidence of pulmonary hypertension.  No PFO seen with color Doppler.  ------------------------------------------------------------------------  Conclusions:  Normal left ventricular systolic function. No segmental  wall motion abnormalities.  Trace pericardial effusion.    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 Patient states that after the D&C on Friday she was recovering appropriately, tolerating PO, ambulating, mild abdominal cramping, no vaginal discharge or vaginal bleeding. Yesterday evening, she noted a fever of 103. She had some relief with Tylenol and cold compresses. This morning she was febrile again and presented to the ED. She reports associated nausea, urinary frequency and urgency. No vomiting or diarrhea. Also reporting diffuse fatigue, frontal pressure-like headache and neck ache, improved with Tylenol. Denies any CP, SOB, cough, focal numbness, tingling, weakness, n/v/d, sick contact.   Patient is UTD on all cancer screenings, colonoscopy 4 years ago wnl, mammograms and Pap Smears also benign.   On arrival to the ED, patient febrile to 103.2, , -167/57-81, RR 16-22 O2 93-98% on RA. CBC with no leukocytosis, platelets 143, ESR/CRP 48/38, lactate 1.8, UA clean, RVP negative, CT A/P w/ colitis of cecum and proximal ascending colon, prominent left adnexa, splenomegaly and extensive lymphadenopathy, indeterminate partially imaged fat density right pericardial structure. Transvaginal ultrasound reveals echogenic material within the endometrium. CXR unrevealing.   pt with s/p recent GYN surgery with intermittent chest pain last few months , last stress test years ago  ecg  will adjust bp meds, over all well controll, continue current med  check lipid  echo/ ct chest to evaluate pericardial abnormality/ ct noted ?lymphoma  pt needs ischemia work up with sig cardiac risk factor not now  DVT prophylaxis  GYN follow up  replete K  ID noted  LN biopsy  echo results noted, normal Ef, small pericardial effusion, will observe for now  bp is  well controlled, continue losartan and beta blocker

## 2021-07-14 NOTE — PROGRESS NOTE ADULT - NSICDXPILOT_GEN_ALL_CORE
[place]

## 2021-07-14 NOTE — DISCHARGE NOTE PROVIDER - NSDCFUADDAPPT_GEN_ALL_CORE_FT
You have an appointment on 7/19 at 0800 to arrive at 0700  Please have a family member or friend accompany you  Please do not eat after midnight on 7/13

## 2021-07-14 NOTE — DISCHARGE NOTE PROVIDER - NSDCCPCAREPLAN_GEN_ALL_CORE_FT
PRINCIPAL DISCHARGE DIAGNOSIS  Diagnosis: Lymphadenopathy  Assessment and Plan of Treatment:       SECONDARY DISCHARGE DIAGNOSES  Diagnosis: Colitis  Assessment and Plan of Treatment: Colitis  HOME CARE INSTRUCTIONS  Get plenty of rest.  Drink enough water and fluids to keep your urine clear or pale yellow.  Eat a well-balanced diet.  Call your caregiver for follow-up as recommended.  SEEK IMMEDIATE MEDICAL CARE IF:  You develop chills.  You have extreme weakness, fainting, or dehydration.  You have repeated vomiting.  You develop severe belly (abdominal) pain or are passing bloody or tarry stools      Diagnosis: Diabetes mellitus  Assessment and Plan of Treatment: Diabetes mellitus  Make sure you get your HgA1c checked every three months.  If you take oral diabetes medications, check your blood glucose two times a day.  If you take insulin, check your blood glucose before meals and at bedtime.  It's important not to skip any meals.  Keep a log of your blood glucose results and always take it with you to your doctor appointments.  Keep a list of your current medications including injectables and over the counter medications and bring this medication list with you to all your doctor appointments.  If you have not seen your opthalmologist this year call for appointment.  Check your feet daily for redness, sores, or openings. Do not self treat. If no improvement in two days call your primary care physician for an appointment.  Low blood sugar (hypoglycemia) is a blood sugar below 70mg/dl. Check your blood sugar if you feel signs/symptoms of hypoglycemia. If your blood sugar is below 70 take 15 grams of carbohydrates (ex 4 oz of apple juice, 3-4 glucosr tablets, or 4-6 oz of regular soda) wait 15 minutes and repeat blood sugar to make sure it comes up above 70.  If your blood sugar is above 70 and you are due for a meal, have a meal.  If you are not due for a meal have a snack.  This snack helps keeps your blood sugar at a safe range.      Diagnosis: HTN (hypertension)  Assessment and Plan of Treatment: HTN (hypertension)  Low salt diet  Activity as tolerated.  Take all medication as prescribed.  Follow up with your medical doctor for routine blood pressure monitoring at your next visit.  Notify your doctor if you have any of the following symptoms:   Dizziness, Lightheadedness, Blurry vision, Headache, Chest pain, Shortness of breath       PRINCIPAL DISCHARGE DIAGNOSIS  Diagnosis: Lymphadenopathy  Assessment and Plan of Treatment: You have an appointment on 7/19 at 0800 to arrive at 0700   Please have a family member or friend accompany you   Please do not eat after midnight on 7/13        SECONDARY DISCHARGE DIAGNOSES  Diagnosis: Colitis  Assessment and Plan of Treatment: Colitis  HOME CARE INSTRUCTIONS  Get plenty of rest.  Drink enough water and fluids to keep your urine clear or pale yellow.  Eat a well-balanced diet.  Call your caregiver for follow-up as recommended.  SEEK IMMEDIATE MEDICAL CARE IF:  You develop chills.  You have extreme weakness, fainting, or dehydration.  You have repeated vomiting.  You develop severe belly (abdominal) pain or are passing bloody or tarry stools      Diagnosis: Diabetes mellitus  Assessment and Plan of Treatment: Diabetes mellitus  Make sure you get your HgA1c checked every three months.  If you take oral diabetes medications, check your blood glucose two times a day.  If you take insulin, check your blood glucose before meals and at bedtime.  It's important not to skip any meals.  Keep a log of your blood glucose results and always take it with you to your doctor appointments.  Keep a list of your current medications including injectables and over the counter medications and bring this medication list with you to all your doctor appointments.  If you have not seen your opthalmologist this year call for appointment.  Check your feet daily for redness, sores, or openings. Do not self treat. If no improvement in two days call your primary care physician for an appointment.  Low blood sugar (hypoglycemia) is a blood sugar below 70mg/dl. Check your blood sugar if you feel signs/symptoms of hypoglycemia. If your blood sugar is below 70 take 15 grams of carbohydrates (ex 4 oz of apple juice, 3-4 glucosr tablets, or 4-6 oz of regular soda) wait 15 minutes and repeat blood sugar to make sure it comes up above 70.  If your blood sugar is above 70 and you are due for a meal, have a meal.  If you are not due for a meal have a snack.  This snack helps keeps your blood sugar at a safe range.      Diagnosis: HTN (hypertension)  Assessment and Plan of Treatment: HTN (hypertension)  Low salt diet  Activity as tolerated.  Take all medication as prescribed.  Follow up with your medical doctor for routine blood pressure monitoring at your next visit.  Notify your doctor if you have any of the following symptoms:   Dizziness, Lightheadedness, Blurry vision, Headache, Chest pain, Shortness of breath

## 2021-07-14 NOTE — DISCHARGE NOTE PROVIDER - NSDCMRMEDTOKEN_GEN_ALL_CORE_FT
acetaminophen 500 mg oral tablet: 2 tab(s) orally every 6 hours  amLODIPine 10 mg oral tablet: 1 tab(s) orally once a day  Anoro Ellipta 62.5 mcg-25 mcg/inh inhalation powder: 1 puff(s) inhaled once a day  aspirin 81 mg oral tablet: 1 tab(s) orally once a day  Calcium 500+D:   Crestor 10 mg oral tablet: 1 tab(s) orally once a day  FLUoxetine 10 mg oral capsule: 1 cap(s) orally once a day  glimepiride 2 mg oral tablet: 1 tab(s) orally 2 times a day  ibuprofen 200 mg oral tablet: 3 tab(s) orally every 6 hours  Klor-Con 10 mEq oral tablet, extended release: 1 tab(s) orally once a day (at bedtime)  losartan-hydrochlorothiazide 100mg-12.5mg oral tablet: 1 tab(s) orally once a day  metFORMIN 1000 mg oral tablet: 1 tab(s) orally 2 times a day  montelukast 10 mg oral tablet: 1 tab(s) orally once a day  Multiple Vitamins oral tablet: 1 tab(s) orally once a day  Synthroid 50 mcg (0.05 mg) oral tablet: 1 tab(s) orally once a day  Vitamin C:   Vitamin D3:    acetaminophen 500 mg oral tablet: 2 tab(s) orally every 6 hours  amLODIPine 10 mg oral tablet: 1 tab(s) orally once a day  Anoro Ellipta 62.5 mcg-25 mcg/inh inhalation powder: 1 puff(s) inhaled once a day  Calcium 500+D:   Crestor 10 mg oral tablet: 1 tab(s) orally once a day  FLUoxetine 10 mg oral capsule: 1 cap(s) orally once a day  glimepiride 2 mg oral tablet: 1 tab(s) orally 2 times a day  Klor-Con 10 mEq oral tablet, extended release: 1 tab(s) orally once a day (at bedtime)  losartan-hydrochlorothiazide 100mg-12.5mg oral tablet: 1 tab(s) orally once a day  metFORMIN 1000 mg oral tablet: 1 tab(s) orally 2 times a day  montelukast 10 mg oral tablet: 1 tab(s) orally once a day  Multiple Vitamins oral tablet: 1 tab(s) orally once a day  Synthroid 50 mcg (0.05 mg) oral tablet: 1 tab(s) orally once a day  Vitamin C:   Vitamin D3:    amLODIPine 10 mg oral tablet: 1 tab(s) orally once a day  Anoro Ellipta 62.5 mcg-25 mcg/inh inhalation powder: 1 puff(s) inhaled once a day  azithromycin 500 mg oral tablet: 1 tab(s) orally once a day to complete a 7 day course   Calcium 500+D:   Crestor 10 mg oral tablet: 1 tab(s) orally once a day  FLUoxetine 10 mg oral capsule: 1 cap(s) orally once a day  glimepiride 2 mg oral tablet: 1 tab(s) orally 2 times a day  Klor-Con 10 mEq oral tablet, extended release: 1 tab(s) orally once a day (at bedtime)  metFORMIN 1000 mg oral tablet: 1 tab(s) orally 2 times a day  montelukast 10 mg oral tablet: 1 tab(s) orally once a day  Multiple Vitamins oral tablet: 1 tab(s) orally once a day  Synthroid 50 mcg (0.05 mg) oral tablet: 1 tab(s) orally once a day  Vitamin C:   Vitamin D3:

## 2021-07-14 NOTE — PROGRESS NOTE ADULT - PROVIDER SPECIALTY LIST ADULT
Heme/Onc
Cardiology
Gastroenterology
Gastroenterology
GYN
Heme/Onc
Infectious Disease
Internal Medicine
Internal Medicine
Cardiology
Internal Medicine
Surgery

## 2021-07-14 NOTE — DISCHARGE NOTE PROVIDER - CARE PROVIDER_API CALL
ISHMAEL MACK  [facility]  Phone: [phone]  Fax: [phone]  Follow Up Time:     Jessica Moreno  HEMATOLOGY  [address], [address]  [place] [address]  Phone: [phone]  Fax: [phone]  Follow Up Time:

## 2021-07-14 NOTE — PROGRESS NOTE ADULT - ASSESSMENT
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  CT A/P w/ colitis of cecum and proximal ascending colon, prominent left adnexa, splenomegaly and extensive lymphadenopathy, indeterminate partially imaged fat density right pericardial structure. Transvaginal ultrasound reveals echogenic material within the endometrium.  Multiple lymphnodes are present in the supraclavicular region bilaterally.  Multiple lymph nodes are present in the anterior mediastinum, left internal mammary chain, pretracheal space, AP window and in the right cardiophrenic angle. One of the largest lymph node is noted in the anterior mediastinum and measures 2 x 2 centimeters. Heart is normal in size. Calcification of the coronary arteries is noted. Very small pericardial effusion is noted.  Dx of lymphomatous process in dd of extensive lymphadenpathy. given the lack of systemic symptoms except before admission, unlikely to be high grade, LDH, uric acid not high.   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  CT A/P w/ colitis of cecum and proximal ascending colon, prominent left adnexa, splenomegaly and extensive lymphadenopathy, indeterminate partially imaged fat density right pericardial structure. Transvaginal ultrasound reveals echogenic material within the endometrium.  Multiple lymphnodes are present in the supraclavicular region bilaterally.  Multiple lymph nodes are present in the anterior mediastinum, left internal mammary chain, pretracheal space, AP window and in the right cardiophrenic angle. One of the largest lymph node is noted in the anterior mediastinum and measures 2 x 2 centimeters. Heart is normal in size. Calcification of the coronary arteries is noted. Very small pericardial effusion is noted.  Dx of lymphomatous process in dd of extensive lymphadenpathy. given the lack of systemic symptoms except before admission, unlikely to be high grade, LDH, uric acid not high.   Biopsy planning in process, cores will be needed for dx and immunostains, molecular studies as needed for appropriate management  No h/o VTE, LA negative, reviewed labs from earlier, she only had ISIDORO IgM positive  GI note and stool result of Campylobacter by PCR noted  Patient will need follow up in office on DC

## 2021-07-14 NOTE — PROGRESS NOTE ADULT - ASSESSMENT
56 y/o postmenopausal female        with PMHx of T2DM (A1c 7.6% 2021), morbid obesity s/p vertical sleeve gastrectomy in 2018, HTN, HLD,       morbid obesity, AQUILES not on CPAP, depression, mild asthma         D&C and polyp removal  on 7/9, for endometrial hyperplasia , no perforation pe r note       * presenting with fevers at home from  7/10         with  bandemia,  , mild lactate elevation.,  with   resolving sepsis, from  ?  endometritis/ colitis         no abd pain. mild  non bloody diarrhea        house  ID  dr lynn, seen by gyn/ spoke  with dr charles, gyn    *  CT   with  colitis/  prominent left adnexa/  splenomegaly/ and,  l adenopathy/ pericardial density            need  to  r/o malignancy           oncology dr hughes,   gi dr kendrick/  card  called   *   HTN/  HLD         on asa, lipitor/ norvasc/ cozaar/  hxtz   *  bmi is  42    *    h/o  Ffevers, was on iv  zosyn,     not ill  appearing              blood  and urine   c/s, is negative            fevers  may be  from her presumed  lymphoma    *     CT chest,    mediastinal  and  supraclavicular l adenopathy /  Ct a/p,  RP, retrocrural, etc  l adenopathy             need  to  r/o lymphoma          IR  eval  for   l node bx/  per IR, pt needs  to be off  asa  for 5  days       [facility]  echo       surg  onc  called.  [facility]iat  f/p  for bx    stool with  campylobacter, on po z  max     rad< from: CT Chest No Cont (07.12.21 @ 12:45) >  IMPRESSION: Multiple lymph nodes are present in the mediastinum and supraclavicular region bilaterally as described above. Exact etiology is unclear. One of the differential diagnostic consideration includes lymphoma.  Very small pericardial effusion  < end of copied text >      rad< from: US Transvaginal (07.11.21 @ 18:58) >  Other: Multiple collateral vessels in the pelvis.  IMPRESSION:  Echogenic material within the endometrium, likely representing clot in the context of recent D&C.  < end of copied text >     ra< from: CT Abdomen and Pelvis w/ IV Cont (07.11.21 @ 14:48) >  MPRESSION:  Mild wall thickening of the cecum and proximal ascending colon with pericolic inflammatory change, suggesting colitis. Differential includes infectious, inflammatory and ischemic etiology. Given focal nature, if not recently performed, recommend colonoscopy after resolution of symptomatology to exclude underlying lesion.  Left adnexa isprominent and inseparable from multiple collateral vessels. Consider more definitive characterization with pelvic ultrasound.  Splenomegaly and extensive lymphadenopathy of uncertain etiology. Differential includes inflammatory and neoplastic etiologies. Correlate clinically.  A partially imaged fat density right pericardial structure is indeterminate. Consider CT chest for further characterization.  --- End of Report --  < end of copied text >   56 y/o postmenopausal female        with PMHx of T2DM (A1c 7.6% 2021), morbid obesity s/p vertical sleeve gastrectomy in 2018, HTN, HLD,       morbid obesity, AQUILES not on CPAP, depression, mild asthma         D&C and polyp removal  on 7/9, for endometrial hyperplasia , no perforation pe r note       * presenting with fevers at home from  7/10         with  bandemia,  , mild lactate elevation.,  with   resolving sepsis, from  ?  endometritis/ colitis         no abd pain. mild  non bloody diarrhea        house  ID  dr lynn, seen by gyn/ spoke  with dr charles, gyn    *  CT   with  colitis/  prominent left adnexa/  splenomegaly/ and,  l adenopathy/ pericardial density            need  to  r/o malignancy           oncology dr hughes,   gi dr kendrick/  card  called   *   HTN/  HLD         on asa, lipitor/ norvasc/ cozaar/  hxtz   *  bmi is  42    *    h/o  Ffevers, was on iv  zosyn,     not ill  appearing              blood  and urine   c/s, is negative            fevers  may be  from her presumed  lymphoma   *  DM        endo  eval    *     CT chest,    mediastinal  and  supraclavicular l adenopathy /  Ct a/p,  RP, retrocrural, etc  l adenopathy             need  to  r/o lymphoma          IR  eval  for   l node bx/  per IR, pt needs  to be off  asa  for 5  days       [facility]  echo       surg  onc  called.  awiat  f/p  for bx    stool with  campylobacter, on po z  max     rad< from: CT Chest No Cont (07.12.21 @ 12:45) >  IMPRESSION: Multiple lymph nodes are present in the mediastinum and supraclavicular region bilaterally as described above. Exact etiology is unclear. One of the differential diagnostic consideration includes lymphoma.  Very small pericardial effusion  < end of copied text >      rad< from: US Transvaginal (07.11.21 @ 18:58) >  Other: Multiple collateral vessels in the pelvis.  IMPRESSION:  Echogenic material within the endometrium, likely representing clot in the context of recent D&C.  < end of copied text >     ra< from: CT Abdomen and Pelvis w/ IV Cont (07.11.21 @ 14:48) >  MPRESSION:  Mild wall thickening of the cecum and proximal ascending colon with pericolic inflammatory change, suggesting colitis. Differential includes infectious, inflammatory and ischemic etiology. Given focal nature, if not recently performed, recommend colonoscopy after resolution of symptomatology to exclude underlying lesion.  Left adnexa isprominent and inseparable from multiple collateral vessels. Consider more definitive characterization with pelvic ultrasound.  Splenomegaly and extensive lymphadenopathy of uncertain etiology. Differential includes inflammatory and neoplastic etiologies. Correlate clinically.  A partially imaged fat density right pericardial structure is indeterminate. Consider CT chest for further characterization.  --- End of Report --  < end of copied text >   56 y/o postmenopausal female        with PMHx of T2DM (A1c 7.6% 2021), morbid obesity s/p vertical sleeve gastrectomy in 2018, HTN, HLD,       morbid obesity, AQUILES not on CPAP, depression, mild asthma         D&C and polyp removal  on 7/9, for endometrial hyperplasia , no perforation pe r note       * presenting with fevers at home from  7/10         with  bandemia,  , mild lactate elevation.,  with   resolving sepsis, from  ?  endometritis/ colitis         no abd pain. mild  non bloody diarrhea        house  ID  dr lynn, seen by gyn/ spoke  with dr charles, gyn    *  CT   with  colitis/  prominent left adnexa/  splenomegaly/ and,  l adenopathy/ pericardial density            need  to  r/o malignancy           oncology dr hughes,   gi dr kendrick/  card  called   *   HTN/  HLD         on asa, lipitor/ norvasc/ cozaar/  hxtz   *  bmi is  42    *    h/o  Ffevers, was on iv  zosyn,     not ill  appearing              blood  and urine   c/s, is negative            fevers  may be  from her presumed  lymphoma   *  DM        endo  eval    *     CT chest,    mediastinal  and  supraclavicular l adenopathy /  Ct a/p,  RP, retrocrural, etc  l adenopathy             need  to  r/o lymphoma          IR  eval  for   l node bx/  per IR, pt needs  to be off  asa  for 5  days       [facility]  echo         discussed  with carmella baez  f/p   by team  for bx     once  bx  is  performed. then,  may start   d/c  planning and pt will  f/p with dr hughes  for results/  rx     stool with  campylobacter, on po z  max   discussed  with  jina blackman< from: CT Chest No Cont (07.12.21 @ 12:45) >  IMPRESSION: Multiple lymph nodes are present in the mediastinum and supraclavicular region bilaterally as described above. Exact etiology is unclear. One of the differential diagnostic consideration includes lymphoma.  Very small pericardial effusion  < end of copied text >      rad< from: US Transvaginal (07.11.21 @ 18:58) >  Other: Multiple collateral vessels in the pelvis.  IMPRESSION:  Echogenic material within the endometrium, likely representing clot in the context of recent D&C.  < end of copied text >     ra< from: CT Abdomen and Pelvis w/ IV Cont (07.11.21 @ 14:48) >  MPRESSION:  Mild wall thickening of the cecum and proximal ascending colon with pericolic inflammatory change, suggesting colitis. Differential includes infectious, inflammatory and ischemic etiology. Given focal nature, if not recently performed, recommend colonoscopy after resolution of symptomatology to exclude underlying lesion.  Left adnexa isprominent and inseparable from multiple collateral vessels. Consider more definitive characterization with pelvic ultrasound.  Splenomegaly and extensive lymphadenopathy of uncertain etiology. Differential includes inflammatory and neoplastic etiologies. Correlate clinically.  A partially imaged fat density right pericardial structure is indeterminate. Consider CT chest for further characterization.  --- End of Report --  < end of copied text >

## 2021-07-14 NOTE — PROGRESS NOTE ADULT - ASSESSMENT
55F presenting with campylobacter colitis being managed medically with IV abx who is incidentally found to have extensive RP lymphadenopathy. Surgery consulted for possible LN biopsy. IR already consulted, patient on day 2 of 5 day ASA washout. Given location, reasonable to attempt percutaneously prior to attempting open biopsy.    - f/u pathology for IR LN bx  - Follow-up with Dr. Hurley as outpatient:  [address] [place] [id number]  [phone]    - Please re-page if questions      Red Team Surgery  [id number]   55F presenting with campylobacter colitis being managed medically with IV abx who is incidentally found to have extensive RP lymphadenopathy. Surgery consulted for possible LN biopsy. IR already consulted, patient on day 2 of 5 day ASA washout. Given location, reasonable to attempt percutaneously prior to attempting open biopsy.    - f/u pathology for IR LN bx  - Follow-up with Dr. Hurley as outpatient:        [address] [place] [address]        [phone]  - Please re-page if questions      Red Team Surgery  [id number]

## 2021-07-14 NOTE — PROGRESS NOTE ADULT - SUBJECTIVE AND OBJECTIVE BOX
afebrile  REVIEW OF SYSTEMS:  GEN: no fever,    no chills  RESP: no SOB,   no cough  CVS: no chest pain,   no palpitations  GI: no abdominal pain,   no nausea,   no vomiting,   no constipation,   no diarrhea  : no dysuria,   no frequency  NEURO: no headache,   no dizziness  PSYCH: no depression,   not anxious  Derm : no rash    MEDICATIONS  (STANDING):  amLODIPine   Tablet 10 milliGRAM(s) Oral daily  atorvastatin 20 milliGRAM(s) Oral at bedtime  azithromycin   Tablet 500 milliGRAM(s) Oral daily  dextrose 40% Gel 15 Gram(s) Oral once  dextrose 5%. 1000 milliLiter(s) (50 mL/Hr) IV Continuous <Continuous>  dextrose 5%. 1000 milliLiter(s) (100 mL/Hr) IV Continuous <Continuous>  dextrose 50% Injectable 25 Gram(s) IV Push once  dextrose 50% Injectable 12.5 Gram(s) IV Push once  dextrose 50% Injectable 25 Gram(s) IV Push once  FLUoxetine 10 milliGRAM(s) Oral daily  glucagon  Injectable 1 milliGRAM(s) IntraMuscular once  glycopyrrolate 9 MICROgram(s)/formoterol 4.8 MICROgram(s) Inhaler 2 Puff(s) Inhalation two times a day  hydrochlorothiazide 12.5 milliGRAM(s) Oral daily  insulin lispro (ADMELOG) corrective regimen sliding scale   SubCutaneous three times a day before meals  insulin lispro (ADMELOG) corrective regimen sliding scale   SubCutaneous at bedtime  levothyroxine 50 MICROGram(s) Oral daily  losartan 100 milliGRAM(s) Oral daily  montelukast 10 milliGRAM(s) Oral daily  potassium phosphate / sodium phosphate Powder (PHOS-NaK) 1 Packet(s) Oral three times a day with meals    MEDICATIONS  (PRN):  acetaminophen   Tablet .. 650 milliGRAM(s) Oral every 6 hours PRN Mild Pain (1 - 3)      Vital Signs Last 24 Hrs  T(C): 36.8 (14 Jul 2021 05:45), Max: 37.5 (13 Jul 2021 12:03)  T(F): 98.2 (14 Jul 2021 05:45), Max: 99.5 (13 Jul 2021 12:03)  HR: 63 (14 Jul 2021 05:45) (63 - 76)  BP: 130/78 (14 Jul 2021 05:45) (97/61 - 137/80)  BP(mean): --  RR: 18 (14 Jul 2021 05:45) (18 - 18)  SpO2: 97% (14 Jul 2021 05:45) (95% - 97%)  CAPILLARY BLOOD GLUCOSE      POCT Blood Glucose.: 324 mg/dL (13 Jul 2021 21:32)  POCT Blood Glucose.: 177 mg/dL (13 Jul 2021 17:10)  POCT Blood Glucose.: 218 mg/dL (13 Jul 2021 12:20)    I&O's Summary    13 Jul 2021 07:01  -  14 Jul 2021 07:00  --------------------------------------------------------  IN: 240 mL / OUT: 0 mL / NET: 240 mL        PHYSICAL EXAM:  HEAD:  Atraumatic, Normocephalic  NECK: Supple, No   JVD  CHEST/LUNG:   no     rales,     no,    rhonchi  HEART: Regular rate and rhythm;         murmur  ABDOMEN: Soft, Nontender, ;   EXTREMITIES:    no    edema  NEUROLOGY:  alert    LABS:    07-14    137  |  102  |  16  ----------------------------<  253<H>  3.9   |  24  |  0.65    Ca    8.8      14 Jul 2021 06:46  Phos  2.6     07-13  Mg     1.9     07-13 07-12 @ 13:35  3.5  21              Consultant(s) Notes Reviewed:      Care Discussed with Consultants/Other Providers:

## 2021-07-14 NOTE — PROGRESS NOTE ADULT - PROBLEM SELECTOR PLAN 1
pt with s/p recent GYN surgery  CT showed colitis  PCR results show campylobacter   continue diet as tolerated   encourage patient to drink plenty of fluids   continue PO zithromax for today   dc planning as per primary   d/w patient

## 2021-07-14 NOTE — DISCHARGE NOTE NURSING/CASE MANAGEMENT/SOCIAL WORK - PATIENT PORTAL LINK FT
You can access the FollowMyHealth Patient Portal offered by [facility] by registering at the following website: [email or web address]. By joining [employer]’s FollowMyHealth portal, you will also be able to view your health information using other applications (apps) compatible with our system.

## 2021-07-14 NOTE — PROGRESS NOTE ADULT - SUBJECTIVE AND OBJECTIVE BOX
SURGERY PROGRESS NOTE    SUBJECTIVE / 24H EVENTS:  Patient seen and examined on morning rounds. No acute events overnight. Patient without any complaints currently, would like to go home.       OBJECTIVE:  VITAL SIGNS:  T(C): 36.8 (07-14-21 @ 05:45), Max: 37.5 (07-13-21 @ 16:03)  HR: 63 (07-14-21 @ 05:45) (63 - 76)  BP: 130/78 (07-14-21 @ 05:45) (126/77 - 137/80)  RR: 18 (07-14-21 @ 05:45) (18 - 18)  SpO2: 97% (07-14-21 @ 05:45) (95% - 97%)  Daily     Daily   POCT Blood Glucose.: 259 mg/dL (07-14-21 @ 12:57)  POCT Blood Glucose.: 282 mg/dL (07-14-21 @ 08:54)  POCT Blood Glucose.: 324 mg/dL (07-13-21 @ 21:32)      PHYSICAL EXAM:  Gen: NAD  LS: Respirations unlabored on RA  GI: Soft. Nontender. Nondistended.   Ext: Warm, well perfused      07-13-21 @ 07:01  -  07-14-21 @ 07:00  --------------------------------------------------------  IN:    Oral Fluid: 240 mL  Total IN: 240 mL    OUT:  Total OUT: 0 mL    Total NET: 240 mL          LAB VALUES:  07-14    137  |  102  |  16  ----------------------------<  253<H>  3.9   |  24  |  0.65    Ca    8.8      14 Jul 2021 06:46  Phos  2.6     07-13  Mg     1.9     07-13                           MICROBIOLOGY:    GI PCR Panel, Stool (collected 12 Jul 2021 16:19)  Source: .Stool Feces  Final Report (12 Jul 2021 22:59):    Campylobacter species    DETECTED by PCR    *******Please Note:*******    GI panel PCR evaluates for:    Campylobacter, Plesiomonas shigelloides, Salmonella,    Vibrio, Yersinia enterocolitica, Enteroaggregative    Escherichia coli (EAEC), Enteropathogenic E.coli (EPEC),    Enterotoxigenic E. coli (ETEC) lt/st, Shiga-like    toxin-producing E. coli (STEC) stx1/stx2,    Shigella/ Enteroinvasive E. coli (EIEC), Cryptosporidium,    Cyclospora cayetanensis, Entamoeba histolytica,    Giardia lamblia, Adenovirus F 40/41, Astrovirus,    Norovirus GI/GII, Rotavirus A, Sapovirus    Culture - Stool (collected 12 Jul 2021 16:19)  Source: .Stool Feces  Preliminary Report (13 Jul 2021 17:20):    No enteric pathogens to date: Final culture pending    Culture - Urine (collected 11 Jul 2021 16:24)  Source: .Urine Clean Catch (Midstream)  Final Report (12 Jul 2021 16:45):    <10,000 CFU/mL Normal Urogenital Loree        RADIOLOGY:        MEDICATIONS  (STANDING):  amLODIPine   Tablet 10 milliGRAM(s) Oral daily  atorvastatin 20 milliGRAM(s) Oral at bedtime  azithromycin   Tablet 500 milliGRAM(s) Oral daily  dextrose 40% Gel 15 Gram(s) Oral once  dextrose 5%. 1000 milliLiter(s) (50 mL/Hr) IV Continuous <Continuous>  dextrose 5%. 1000 milliLiter(s) (100 mL/Hr) IV Continuous <Continuous>  dextrose 50% Injectable 25 Gram(s) IV Push once  dextrose 50% Injectable 12.5 Gram(s) IV Push once  dextrose 50% Injectable 25 Gram(s) IV Push once  FLUoxetine 10 milliGRAM(s) Oral daily  glucagon  Injectable 1 milliGRAM(s) IntraMuscular once  glycopyrrolate 9 MICROgram(s)/formoterol 4.8 MICROgram(s) Inhaler 2 Puff(s) Inhalation two times a day  hydrochlorothiazide 12.5 milliGRAM(s) Oral daily  insulin lispro (ADMELOG) corrective regimen sliding scale   SubCutaneous three times a day before meals  insulin lispro (ADMELOG) corrective regimen sliding scale   SubCutaneous at bedtime  levothyroxine 50 MICROGram(s) Oral daily  losartan 100 milliGRAM(s) Oral daily  montelukast 10 milliGRAM(s) Oral daily    MEDICATIONS  (PRN):  acetaminophen   Tablet .. 650 milliGRAM(s) Oral every 6 hours PRN Mild Pain (1 - 3)

## 2021-07-14 NOTE — DISCHARGE NOTE PROVIDER - HOSPITAL COURSE
56 y/o postmenopausal female with PMHx of T2DM (A1c 7.6% 2021), morbid obesity s/p vertical sleeve gastrectomy in 2018, HTN, HLD, morbid obesity, AQUILES not on CPAP, depression, mild asthma D&C and polyp removal  on 7/9, for endometrial hyperplasia , no perforation pe r note    presenting with fevers at home from  7/10 with  bandemia, mild lactate elevation.,  with resolving sepsis, from  ?  endometritis/ colitis  no abd pain. mild  non bloody diarrhea  house  ID  dr lynn, seen by gyn/ spoke  with dr charles, gyn    CT   with  colitis prominent left adnexa splenomegaly and, adenopathy pericardial density need  to  r/o malignancy  oncology dr hughes,   gi dr kendrick/  card  called    HTN/  HLD  on asa, lipitor/ norvasc/ cozaar/  hxtz  bmi is  42    h/o  Fevers, was on iv  zosyn,   not ill  appearing  blood  and urine   c/s, is negative  fevers  may be  from her presumed  lymphoma    DM endo  eval  CT chest  mediastinal  and  supraclavicular l adenopathy  Ct a/p,  RP, retrocrural, etc  l adenopathy  need  to  r/o lymphoma  IR  eval  for   l node bx/  per IR, pt needs  to be off  asa  for 5  days  awiat  echo  discussed  with chico redmond,   awiat  f/p   by team  for bx  once  bx  is  performed. then,  may start   d/c  planning and pt will  f/p with dr hughes  for results/  rx   stool with  campylobacter, on po z  max  discussed  with  jina blackman< from: CT Chest No Cont (07.12.21 @ 12:45) >  IMPRESSION: Multiple lymph nodes are present in the mediastinum and supraclavicular region bilaterally as described above. Exact etiology is unclear. One of the differential diagnostic consideration includes lymphoma.  Very small pericardial effusion  < end of copied text >      rad< from: US Transvaginal (07.11.21 @ 18:58) >  Other: Multiple collateral vessels in the pelvis.  IMPRESSION:  Echogenic material within the endometrium, likely representing clot in the context of recent D&C.  < end of copied text >     ra< from: CT Abdomen and Pelvis w/ IV Cont (07.11.21 @ 14:48) >  MPRESSION:  Mild wall thickening of the cecum and proximal ascending colon with pericolic inflammatory change, suggesting colitis. Differential includes infectious, inflammatory and ischemic etiology. Given focal nature, if not recently performed, recommend colonoscopy after resolution of symptomatology to exclude underlying lesion.  Left adnexa isprominent and inseparable from multiple collateral vessels. Consider more definitive characterization with pelvic ultrasound.  Splenomegaly and extensive lymphadenopathy of uncertain etiology. Differential includes inflammatory and neoplastic etiologies. Correlate clinically.  A partially imaged fat density right pericardial structure is indeterminate. Consider CT chest for further characterization.  --- End of Report --  < end of copied text >

## 2021-07-15 ENCOUNTER — TRANSCRIPTION ENCOUNTER (OUTPATIENT)
Age: 56
End: 2021-07-15

## 2021-07-15 LAB
ACE SERPL-CCNC: 29 U/L — SIGNIFICANT CHANGE UP (ref 14–82)
EBV EA AB SER IA-ACNC: >150 U/ML — HIGH
EBV EA AB TITR SER IF: POSITIVE
EBV EA IGG SER-ACNC: POSITIVE
EBV NA IGG SER IA-ACNC: 103 U/ML — HIGH
EBV VCA IGG AVIDITY SER QL IA: POSITIVE
EBV VCA IGM SER IA-ACNC: 13.2 U/ML — SIGNIFICANT CHANGE UP
EBV VCA IGM SER IA-ACNC: 362 U/ML — HIGH
EBV VCA IGM TITR FLD: NEGATIVE — SIGNIFICANT CHANGE UP
GAMMA INTERFERON BACKGROUND BLD IA-ACNC: 0.04 IU/ML — SIGNIFICANT CHANGE UP
M TB IFN-G BLD-IMP: NEGATIVE — SIGNIFICANT CHANGE UP
M TB IFN-G CD4+ BCKGRND COR BLD-ACNC: -0.01 IU/ML — SIGNIFICANT CHANGE UP
M TB IFN-G CD4+CD8+ BCKGRND COR BLD-ACNC: 0 IU/ML — SIGNIFICANT CHANGE UP
QUANT TB PLUS MITOGEN MINUS NIL: 9.3 IU/ML — SIGNIFICANT CHANGE UP

## 2021-07-16 LAB
ANA TITR SER: NEGATIVE — SIGNIFICANT CHANGE UP
CULTURE RESULTS: SIGNIFICANT CHANGE UP
CULTURE RESULTS: SIGNIFICANT CHANGE UP
SPECIMEN SOURCE: SIGNIFICANT CHANGE UP
SPECIMEN SOURCE: SIGNIFICANT CHANGE UP

## 2021-07-19 ENCOUNTER — OUTPATIENT (OUTPATIENT)
Dept: OUTPATIENT SERVICES | Facility: HOSPITAL | Age: 56
LOS: 1 days | End: 2021-07-19
Payer: COMMERCIAL

## 2021-07-19 ENCOUNTER — RESULT REVIEW (OUTPATIENT)
Age: 56
End: 2021-07-19

## 2021-07-19 ENCOUNTER — APPOINTMENT (OUTPATIENT)
Dept: CT IMAGING | Facility: HOSPITAL | Age: 56
End: 2021-07-19

## 2021-07-19 VITALS — OXYGEN SATURATION: 97 % | WEIGHT: 248.02 LBS | HEIGHT: 64 IN | TEMPERATURE: 100 F

## 2021-07-19 VITALS — TEMPERATURE: 100 F | WEIGHT: 248.02 LBS | HEIGHT: 64 IN | OXYGEN SATURATION: 97 %

## 2021-07-19 DIAGNOSIS — R93.5 ABNORMAL FINDINGS ON DIAGNOSTIC IMAGING OF OTHER ABDOMINAL REGIONS, INCLUDING RETROPERITONEUM: ICD-10-CM

## 2021-07-19 DIAGNOSIS — Z98.84 BARIATRIC SURGERY STATUS: Chronic | ICD-10-CM

## 2021-07-19 DIAGNOSIS — Z90.89 ACQUIRED ABSENCE OF OTHER ORGANS: Chronic | ICD-10-CM

## 2021-07-19 DIAGNOSIS — Z87.59 PERSONAL HISTORY OF OTHER COMPLICATIONS OF PREGNANCY, CHILDBIRTH AND THE PUERPERIUM: Chronic | ICD-10-CM

## 2021-07-19 LAB — GLUCOSE BLDC GLUCOMTR-MCNC: 202 MG/DL — HIGH (ref 70–99)

## 2021-07-19 PROCEDURE — 88173 CYTOPATH EVAL FNA REPORT: CPT | Mod: 26

## 2021-07-19 PROCEDURE — 10005 FNA BX W/US GDN 1ST LES: CPT

## 2021-07-19 PROCEDURE — 88172 CYTP DX EVAL FNA 1ST EA SITE: CPT

## 2021-07-19 PROCEDURE — 82962 GLUCOSE BLOOD TEST: CPT

## 2021-07-19 PROCEDURE — 88173 CYTOPATH EVAL FNA REPORT: CPT

## 2021-07-19 NOTE — PRE PROCEDURE NOTE - GENERAL PROCEDURE NAME
Left supraclavicular lymph node biopsy
Image guided left cervical/supraclavicular lymph node biopsy.
ct guided left supraclavicular lymph node biopsy

## 2021-07-19 NOTE — PROCEDURE NOTE - NSTIMEOUT_GEN_A_CORE
Patient's first and last name, , procedure, and correct site confirmed prior to the start of procedure.
shortness of breath

## 2021-07-19 NOTE — PRE PROCEDURE NOTE - PRE PROCEDURE EVALUATION
Interventional Radiology Pre Procedure Note    HPI: 55y Female with incidentally discovered left supraclavicular lymph node. Biopsy is requested.     Allergies:   Medications (Abx/Cardiac/Anticoagulation/Blood Products)      Data:  162.6  112.5  T(C): 37.6  HR: --  BP: --  RR: --  SpO2: 97%    Exam  General: No acute distress  Chest: Non labored breathing    Plan: 55y Female presents for lymph node biopsy. Procedure and risks discussed with patient and she is agreeable to proceed.   -Risks/Benefits/alternatives explained with the patient and/or healthcare proxy and witnessed informed consent obtained.

## 2021-07-19 NOTE — PRE PROCEDURE NOTE - PRE PROCEDURE EVALUATION
Interventional Radiology    HPI: 55y Female with hx of asthma, DM II, htn, hld, hypothyroid, hypokalemia, presents for left supraclavicular lymph node lymphadenopathy who presents for  biopsy for definitive diagnosis.        PAST MEDICAL & SURGICAL HISTORY:  HTN (hypertension)    Diabetes    Depression    Hypothyroid    Asthma  well controlled    Hypokalemia    Morbidly obese    AQUILES (obstructive sleep apnea)    Hyperlipidemia    S/P tonsillectomy    S/P laparoscopic sleeve gastrectomy  2018    History of ectopic pregnancy      Allergies    No Known Allergies    Intolerances    amoxicillin (Nausea)  Avelox (Other)      Medications (Abx/Cardiac/Anticoagulation/Blood Products)  Home Medications:  amLODIPine 10 mg oral tablet: 1 tab(s) orally once a day (19 Jul 2021 07:39)  Anoro Ellipta 62.5 mcg-25 mcg/inh inhalation powder: 1 puff(s) inhaled once a day (19 Jul 2021 07:39)  Calcium 500+D:  (19 Jul 2021 07:39)  Crestor 10 mg oral tablet: 1 tab(s) orally once a day (19 Jul 2021 07:39)  FLUoxetine 10 mg oral capsule: 1 cap(s) orally once a day (19 Jul 2021 07:39)  glimepiride 2 mg oral tablet: 1 tab(s) orally 2 times a day (19 Jul 2021 07:39)  Klor-Con 10 mEq oral tablet, extended release: 1 tab(s) orally once a day (at bedtime) (19 Jul 2021 07:39)  metFORMIN 1000 mg oral tablet: 1 tab(s) orally 2 times a day (19 Jul 2021 07:39)  montelukast 10 mg oral tablet: 1 tab(s) orally once a day (19 Jul 2021 07:39)  Multiple Vitamins oral tablet: 1 tab(s) orally once a day (19 Jul 2021 07:39)  Synthroid 50 mcg (0.05 mg) oral tablet: 1 tab(s) orally once a day (19 Jul 2021 07:39)  Vitamin C:  (19 Jul 2021 07:39)  Vitamin D3:  (19 Jul 2021 07:39)        Data:  162.6  112.5  T(C): 37.6  HR: --  BP: --  RR: --  SpO2: 97%    Exam  General: No acute distress  Chest: Non labored breathing  Abdomen: Non-distended  Extremities: No swelling, warm      Pertinent labs:    fully covid 19 vaccinated - document on pt's chart  Complete Blood Count + Automated Diff in AM (07.12.21 @ 07:08)    WBC Count: 5.67 K/uL    RBC Count: 4.61 M/uL    Hemoglobin: 13.5 g/dL    Hematocrit: 40.9 %    Mean Cell Volume: 88.7 fl    Mean Cell Hemoglobin: 29.3 pg    Mean Cell Hemoglobin Conc: 33.0 gm/dL    Red Cell Distrib Width: 13.1 %    Platelet Count - Automated: 121 K/uL    Auto Neutrophil #: 5.03 K/uL    Auto Lymphocyte #: 0.20 K/uL    Auto Monocyte #: 0.44 K/uL    Auto Eosinophil #: 0.00 K/uL    Auto Basophil #: 0.00 K/uL    Auto Neutrophil %: 67.0: Differential percentages must be correlated with absolute numbers for  clinical significance. %    Auto Lymphocyte %: 3.5 %    Auto Monocyte %: 7.8 %    Auto Eosinophil %: 0.0 %    Auto Basophil %: 0.0 %    Basic Metabolic Panel in AM (07.14.21 @ 06:46)    Sodium, Serum: 137 mmol/L    Potassium, Serum: 3.9 mmol/L    Chloride, Serum: 102 mmol/L    Carbon Dioxide, Serum: 24 mmol/L    Anion Gap, Serum: 11 mmol/L    Blood Urea Nitrogen, Serum: 16 mg/dL    Creatinine, Serum: 0.65 mg/dL    Glucose, Serum: 253 mg/dL    Calcium, Total Serum: 8.8 mg/dL    eGFR if Non : 99: Interpretative comment  The units for eGFR are mL/min/1.73M2 (normalized body surface area). The  eGFR is calculated from a serum creatinine using the CKD-EPI equation.  Other variables required for calculation are race, age and sex. Among  patients with chronic kidney disease (CKD), the eGFR is useful in  determining the stage of disease according to KDOQI CKD classification.  All eGFR results are reported numerically with the following  interpretation.          GFR                    With                 Without     (ml/min/1.73 m2)    Kidney Damage       Kidney Damage        >= 90                    Stage 1                     Normal        60-89                    Stage 2                     Decreased GFR        30-59     Stage 3                     Stage 3        15-29                    Stage 4                     Stage 4        < 15                      Stage 5                     Stage 5  Each stage of CKD assumes that the associated GFR level has been in  effect for at least 3 months. Determination of stages one and two (with  eGFR > 59 ml/min/m2) requires estimation of kidney damage for at least 3  months as defined by structural or functional abnormalities.  Limitations: All estimates of GFR will be less accurate for patients at  extremes of muscle mass (including but not limited to frail elderly,  critically ill, or cancer patients), those with unusual diets, and those  with conditions associated with reduced secretion or extrarenal  elimination of creatinine. The eGFR equation is not recommended for use  in patients with unstable creatinine levels. mL/min/1.73M2    eGFR if African American: 114 mL/min/1.73M2                  Imaging: head & neck u/s, chest ct reviewed    Plan: 55y Female presents for left supraclavicular lymph node lymphadenopathy who presents for  biopsy for definitive diagnosis.  -npo since 9 pm 7/18  -last low dose aspirin taken 5 days ago    -Risks/Benefits/alternatives explained with the patient / healthcare proxy and witnessed informed consent obtained after pt's / healthcare proxy's comprehension was confirmed.    Marialuisa Rubio [facility]- [facility]  ext [phone]  # [phone]     Interventional Radiology    HPI: 55y Female with hx of asthma, DM II, htn, hld, hypothyroid, hypokalemia, presents for left supraclavicular lymph node lymphadenopathy who presents for  biopsy for definitive diagnosis.        PAST MEDICAL & SURGICAL HISTORY:  HTN (hypertension)    Diabetes    Depression    Hypothyroid    Asthma  well controlled    Hypokalemia    Morbidly obese    AQUILES (obstructive sleep apnea)    Hyperlipidemia    S/P tonsillectomy    S/P laparoscopic sleeve gastrectomy  2018    History of ectopic pregnancy      Allergies    No Known Allergies    Intolerances    amoxicillin (Nausea)  Avelox (Other)      Medications (Abx/Cardiac/Anticoagulation/Blood Products)  Home Medications:  amLODIPine 10 mg oral tablet: 1 tab(s) orally once a day (19 Jul 2021 07:39)  Anoro Ellipta 62.5 mcg-25 mcg/inh inhalation powder: 1 puff(s) inhaled once a day (19 Jul 2021 07:39)  Calcium 500+D:  (19 Jul 2021 07:39)  Crestor 10 mg oral tablet: 1 tab(s) orally once a day (19 Jul 2021 07:39)  FLUoxetine 10 mg oral capsule: 1 cap(s) orally once a day (19 Jul 2021 07:39)  glimepiride 2 mg oral tablet: 1 tab(s) orally 2 times a day (19 Jul 2021 07:39)  Klor-Con 10 mEq oral tablet, extended release: 1 tab(s) orally once a day (at bedtime) (19 Jul 2021 07:39)  metFORMIN 1000 mg oral tablet: 1 tab(s) orally 2 times a day (19 Jul 2021 07:39)  montelukast 10 mg oral tablet: 1 tab(s) orally once a day (19 Jul 2021 07:39)  Multiple Vitamins oral tablet: 1 tab(s) orally once a day (19 Jul 2021 07:39)  Synthroid 50 mcg (0.05 mg) oral tablet: 1 tab(s) orally once a day (19 Jul 2021 07:39)  Vitamin C:  (19 Jul 2021 07:39)  Vitamin D3:  (19 Jul 2021 07:39)        Data:  162.6  112.5  T(C): 37.6  HR: --  BP: --  RR: --  SpO2: 97%    Exam  General: No acute distress  Chest: Non labored breathing  Abdomen: Non-distended  Extremities: No swelling, warm      Pertinent labs:    fully covid 19 vaccinated - document on pt's chart  Complete Blood Count + Automated Diff in AM (07.12.21 @ 07:08)    WBC Count: 5.67 K/uL    RBC Count: 4.61 M/uL    Hemoglobin: 13.5 g/dL    Hematocrit: 40.9 %    Mean Cell Volume: 88.7 fl    Mean Cell Hemoglobin: 29.3 pg    Mean Cell Hemoglobin Conc: 33.0 gm/dL    Red Cell Distrib Width: 13.1 %    Platelet Count - Automated: 121 K/uL    Auto Neutrophil #: 5.03 K/uL    Auto Lymphocyte #: 0.20 K/uL    Auto Monocyte #: 0.44 K/uL    Auto Eosinophil #: 0.00 K/uL    Auto Basophil #: 0.00 K/uL    Auto Neutrophil %: 67.0: Differential percentages must be correlated with absolute numbers for  clinical significance. %    Auto Lymphocyte %: 3.5 %    Auto Monocyte %: 7.8 %    Auto Eosinophil %: 0.0 %    Auto Basophil %: 0.0 %    Basic Metabolic Panel in AM (07.14.21 @ 06:46)    Sodium, Serum: 137 mmol/L    Potassium, Serum: 3.9 mmol/L    Chloride, Serum: 102 mmol/L    Carbon Dioxide, Serum: 24 mmol/L    Anion Gap, Serum: 11 mmol/L    Blood Urea Nitrogen, Serum: 16 mg/dL    Creatinine, Serum: 0.65 mg/dL    Glucose, Serum: 253 mg/dL    Calcium, Total Serum: 8.8 mg/dL    eGFR if Non : 99: Interpretative comment  The units for eGFR are mL/min/1.73M2 (normalized body surface area). The  eGFR is calculated from a serum creatinine using the CKD-EPI equation.  Other variables required for calculation are race, age and sex. Among  patients with chronic kidney disease (CKD), the eGFR is useful in  determining the stage of disease according to KDOQI CKD classification.  All eGFR results are reported numerically with the following  interpretation.          GFR                    With                 Without     (ml/min/1.73 m2)    Kidney Damage       Kidney Damage        >= 90                    Stage 1                     Normal        60-89                    Stage 2                     Decreased GFR        30-59     Stage 3                     Stage 3        15-29                    Stage 4                     Stage 4        < 15                      Stage 5                     Stage 5  Each stage of CKD assumes that the associated GFR level has been in  effect for at least 3 months. Determination of stages one and two (with  eGFR > 59 ml/min/m2) requires estimation of kidney damage for at least 3  months as defined by structural or functional abnormalities.  Limitations: All estimates of GFR will be less accurate for patients at  extremes of muscle mass (including but not limited to frail elderly,  critically ill, or cancer patients), those with unusual diets, and those  with conditions associated with reduced secretion or extrarenal  elimination of creatinine. The eGFR equation is not recommended for use  in patients with unstable creatinine levels. mL/min/1.73M2    eGFR if African American: 114 mL/min/1.73M2                  Imaging: head & neck u/s, chest ct reviewed    Plan: 55y Female presents for left supraclavicular lymph node lymphadenopathy who presents for  biopsy for definitive diagnosis.  -npo since 9 pm 7/18.  -last low dose aspirin taken 5 days ago.    -Risks/Benefits/alternatives explained with the patient / healthcare proxy and witnessed informed consent obtained after pt's / healthcare proxy's comprehension was confirmed.    Marialuisa Rubio [facility]- [facility]  ext [phone]  # [phone]

## 2021-07-19 NOTE — ASU DISCHARGE PLAN (ADULT/PEDIATRIC) - ASU DC SPECIAL INSTRUCTIONSFT
Biopsy Discharge    Discharge Instructions  - You have had a biopsy of left suipraclavicular lymph node.  - You may shower in 24 hours. No soaking or swimming until the site is completely healed.  - Keep the area covered and dry for the next 24 hours.  - Do not perform any heavy lifting for the next few days or until the site is healed.  - You may resume your normal diet.  - You may resume your normal medications however you should wait 48 hours before restarting aspirin, plavix, or blood thinners.  - It is normal to experience some pain over the site for the next few days. You may take apply ice to the area (20 minutes on, 20 minutes off) and take Tylenol for that pain. Do not take more frequently than every 6 hours and do not exceed more than 3000mg of Tylenol in a 24 hour period.      - Do not drive, engage in heavy lifting or strenuous activity, or drink any alcoholic beverages for the next 24 hours.   - You may resume normal activity in 24 hours.    Notify your primary physician and/or Interventional Radiology IMMEDIATELY if you experience any of the following       - Fever of 101F or 38C       - Chills or Rigors/ Shakes       - Swelling and/or Redness in the area around the biopsy site       - Worsening Pain       - Blood soaked bandages or worsening bleeding       - Lightheadedness and/or dizziness upon standing       - Chest Pain/ Tightness       - Shortness of Breath       - Difficulty walking    If you have a problem that you believe requires IMMEDIATE attention, please go to your NEAREST Emergency Room. If you believe your problem can safely wait until you speak to a physician, please call Interventional Radiology at [phone] for any concerns.    During Normal Weekday Business Hours- You can contact the Interventional Radiology department at [phone] during normal business hours via telephone.  During Evenings and Weekends- If you need to contact Interventional Radiology during off hours, do so by calling the hospital at [phone] and request to be connected to the Interventional Radiology Resident on call. Biopsy Discharge    Discharge Instructions  - You have had a biopsy of left suipraclavicular lymph node.  - You may shower in 24 hours. No soaking or swimming until the site is completely healed.  - Keep the area covered and dry for the next 24 hours.  - Do not perform any heavy lifting for the next few days or until the site is healed.  - You may resume your normal diet.  - You may resume your normal medications however you should wait 48 hours before restarting aspirin, plavix, or blood thinners.  - It is normal to experience some pain over the site for the next few days. You may take apply ice to the area (20 minutes on, 20 minutes off) and take Tylenol for that pain. Do not take more frequently than every 6 hours and do not exceed more than 3000mg of Tylenol in a 24 hour period.      - Do not drive, engage in heavy lifting or strenuous activity, or drink any alcoholic beverages for the next 24 hours.   - You may resume normal activity in 24 hours.    Notify your primary physician and/or Interventional Radiology IMMEDIATELY if you experience any of the following       - Fever of 101F or 38C       - Chills or Rigors/ Shakes       - Swelling and/or Redness in the area around the biopsy site       - Worsening Pain.       - Blood soaked bandages or worsening bleeding       - Lightheadedness and/or dizziness upon standing       - Chest Pain/ Tightness       - Shortness of Breath       - Difficulty walking    If you have a problem that you believe requires IMMEDIATE attention, please go to your NEAREST Emergency Room. If you believe your problem can safely wait until you speak to a physician, please call Interventional Radiology at [phone] for any concerns.    During Normal Weekday Business Hours- You can contact the Interventional Radiology department at [phone] during normal business hours via telephone.  During Evenings and Weekends- If you need to contact Interventional Radiology during off hours, do so by calling the hospital at [phone] and request to be connected to the Interventional Radiology Resident on call.

## 2021-07-19 NOTE — PRE PROCEDURE NOTE - PRE PROCEDURE EVALUATION
Interventional Radiology Pre-Procedure Note    Diagnosis/Indication: Patient is a 55y old female with history of endometrial polyp status post biopsy presents found to have multiple left supraclavicular/cervical lymph nodes. Plan for image guided biopsy today.     PAST MEDICAL & SURGICAL HISTORY:  HTN (hypertension)    Diabetes    Depression    Hypothyroid    Asthma  well controlled    Hypokalemia    Morbidly obese    AQUILES (obstructive sleep apnea)    Hyperlipidemia    S/P tonsillectomy    S/P laparoscopic sleeve gastrectomy  2018    History of ectopic pregnancy         Allergies: amoxicillin (Nausea)  Avelox (Other)  No Known Allergies        Procedure/ risks/ benefits were explained, informed consent obtained from patient, verbalizes understanding.  Interventional Radiology Pre-Procedure Note    Diagnosis/Indication: Patient is a 55y old female with history of endometrial polyp status post biopsy presents found to have multiple left supraclavicular/cervical lymph nodes. Plan for image guided biopsy today.     PAST MEDICAL & SURGICAL HISTORY:  HTN (hypertension)    Diabetes    Depression    Hypothyroid    Asthma  well controlled    Hypokalemia    Morbidly obese    AQUILES (obstructive sleep apnea)    Hyperlipidemia    S/P tonsillectomy    S/P laparoscopic sleeve gastrectomy  2018    History of ectopic pregnancy.    Allergies: amoxicillin (Nausea)  Avelox (Other)  No Known Allergies    Procedure/ risks/ benefits were explained, informed consent obtained from patient, verbalizes understanding.

## 2021-07-19 NOTE — PROCEDURE NOTE - PROCEDURE FINDINGS AND DETAILS
Successful left supraclavicluar 1.5 cm lymph node FNA. Specimens deemed adequate by cytopathology. Successful left supraclavicluar 1.5 cm lymph node FNA. Specimens deemed adequate by cytopathology. Level IV lymph node note seen with ultrasound.

## 2021-07-20 LAB — NON-GYNECOLOGICAL CYTOLOGY STUDY: SIGNIFICANT CHANGE UP

## 2021-07-21 DIAGNOSIS — N76.0 ACUTE VAGINITIS: ICD-10-CM

## 2021-07-28 DIAGNOSIS — I89.8 OTHER SPECIFIED NONINFECTIVE DISORDERS OF LYMPHATIC VESSELS AND LYMPH NODES: ICD-10-CM

## 2021-09-01 ENCOUNTER — RX RENEWAL (OUTPATIENT)
Age: 56
End: 2021-09-01

## 2021-10-10 ENCOUNTER — RX RENEWAL (OUTPATIENT)
Age: 56
End: 2021-10-10

## 2021-10-29 LAB
HBA1C MFR BLD HPLC: 6.8
LDLC SERPL CALC-MCNC: 110
MICROALBUMIN/CREAT 24H UR-RTO: 7

## 2021-11-01 ENCOUNTER — APPOINTMENT (OUTPATIENT)
Dept: ENDOCRINOLOGY | Facility: CLINIC | Age: 56
End: 2021-11-01
Payer: COMMERCIAL

## 2021-11-01 PROCEDURE — 99213 OFFICE O/P EST LOW 20 MIN: CPT | Mod: 95

## 2021-11-01 NOTE — PHYSICAL EXAM
[Alert] : alert [Well Nourished] : well nourished [No Acute Distress] : no acute distress [Well Developed] : well developed [Normal Sclera/Conjunctiva] : normal sclera/conjunctiva [EOMI] : extra ocular movement intact [No Proptosis] : no proptosis [Normal Oropharynx] : the oropharynx was normal [Thyroid Not Enlarged] : the thyroid was not enlarged [No Thyroid Nodules] : no palpable thyroid nodules [No Respiratory Distress] : no respiratory distress [No Accessory Muscle Use] : no accessory muscle use [Clear to Auscultation] : lungs were clear to auscultation bilaterally [Normal S1, S2] : normal S1 and S2 [Normal Rate] : heart rate was normal [Regular Rhythm] : with a regular rhythm [No Edema] : no peripheral edema [Pedal Pulses Normal] : the pedal pulses are present [Normal Anterior Cervical Nodes] : no anterior cervical lymphadenopathy [No Spinal Tenderness] : no spinal tenderness [Spine Straight] : spine straight [No Stigmata of Cushings Syndrome] : no stigmata of Cushings Syndrome [Normal Gait] : normal gait [Normal Strength/Tone] : muscle strength and tone were normal [No Rash] : no rash [Acanthosis Nigricans] : no acanthosis nigricans [Normal] : normal [Full ROM] : with full range of motion [Diminished Throughout Both Feet] : normal tactile sensation with monofilament testing throughout both feet [Normal Reflexes] : deep tendon reflexes were 2+ and symmetric [No Tremors] : no tremors [Oriented x3] : oriented to person, place, and time

## 2021-11-08 ENCOUNTER — APPOINTMENT (OUTPATIENT)
Dept: ORTHOPEDIC SURGERY | Facility: CLINIC | Age: 56
End: 2021-11-08
Payer: COMMERCIAL

## 2021-11-08 VITALS
BODY MASS INDEX: 42.34 KG/M2 | WEIGHT: 248 LBS | HEIGHT: 64 IN | SYSTOLIC BLOOD PRESSURE: 171 MMHG | HEART RATE: 71 BPM | DIASTOLIC BLOOD PRESSURE: 91 MMHG

## 2021-11-08 DIAGNOSIS — M65.332 TRIGGER FINGER, LEFT MIDDLE FINGER: ICD-10-CM

## 2021-11-08 DIAGNOSIS — M65.322 TRIGGER FINGER, LEFT INDEX FINGER: ICD-10-CM

## 2021-11-08 DIAGNOSIS — M65.321 TRIGGER FINGER, RIGHT INDEX FINGER: ICD-10-CM

## 2021-11-08 PROCEDURE — 29130 APPL FINGER SPLINT STATIC: CPT | Mod: F6

## 2021-11-08 PROCEDURE — 99203 OFFICE O/P NEW LOW 30 MIN: CPT | Mod: 25

## 2021-11-14 NOTE — ASSESSMENT
[FreeTextEntry1] : T2Dm  m\par much improved contreol with some PM Low BS\par rcont amaryl 2mg bid \par  cont  MFN  1000  mg bid\par cont victoza\par  watch diet nad exercsie  \par \par obesity - trouble losing weight  chek c cortisol and other hormones  \par \par \par low ANC  and now anticardiolipin Ab + see heamtology \par \par \par rare CP- to see caridology  in 2 weeks for Echo etc \par \par asthma  stabel \par  Hypothrydoi Cont RX  0.05 MG SYnthoroid \par stable MNG \par check sono f feb 2022\par \par Low B12 cont RX \par \par Low Vit D cont RX  50K per week \par \par low potassium cont Kdur 8 meq \par on Losartan HCT now \par \par \par  opjhthal - stable \par \par chol-cont rosuvastatin

## 2021-11-14 NOTE — HISTORY OF PRESENT ILLNESS
[Home] : at home, [unfilled] , at the time of the visit. [Medical Office: ([facility])___] : at the medical office located in  [Verbal consent obtained from patient] : the patient, [unfilled] [FreeTextEntry1] : \par TEB follow up start time\par  145pm \par  end time 155pm \par HPI\par \par Follow up for \par \par T2DM on  amaryl 2 mg bid\par  MFN 1000 mg bid \par Victoza 1.8 mg qd  per PMD \par \par started 4 weeks ago\par  No low BS \par  \par no weight loss yet\par \par \par to seeopthalmology \par \par  t seen by POdiatry \par \par Hypothyroidism on LT4 0.05 mg qd \par Low Vit D on 50 K per week \par \par \par Glucose ranges:\par not testing as much ately \par  \par \par Hypoglycemia :\par 1x when did not eat ontime \par Follow ups:\par \par Opthalmology  UTD  asw opthalmology Nov 2020 \par Podiatry  \par \par \par to have polpy removal\par \par Dx with anticadiolipin antibody  by neuro   and low ANC_ to see  heamtology \par \par Renal \par \par ROS No Neck pain No voice changes  No Chest pain  No Pressure  No palpitations No dyspnea   No n/v abd pain diarrhea or constipation  rare LE edema \par \par Labs Reviewed \par Feb 2021\par \par Imaging reviewed\par \par \par

## 2021-11-14 NOTE — REASON FOR VISIT
[Home] : at home, [unfilled] , at the time of the visit. [Medical Office: ([facility])___] : at the medical office located in

## 2021-12-02 ENCOUNTER — RX RENEWAL (OUTPATIENT)
Age: 56
End: 2021-12-02

## 2021-12-09 ENCOUNTER — RX RENEWAL (OUTPATIENT)
Age: 56
End: 2021-12-09

## 2022-01-08 ENCOUNTER — RX RENEWAL (OUTPATIENT)
Age: 57
End: 2022-01-08

## 2022-01-31 DIAGNOSIS — R92.2 INCONCLUSIVE MAMMOGRAM: ICD-10-CM

## 2022-02-07 ENCOUNTER — APPOINTMENT (OUTPATIENT)
Dept: OBGYN | Facility: CLINIC | Age: 57
End: 2022-02-07
Payer: COMMERCIAL

## 2022-02-07 ENCOUNTER — ASOB RESULT (OUTPATIENT)
Age: 57
End: 2022-02-07

## 2022-02-07 VITALS
WEIGHT: 248 LBS | DIASTOLIC BLOOD PRESSURE: 81 MMHG | HEIGHT: 64 IN | BODY MASS INDEX: 42.34 KG/M2 | SYSTOLIC BLOOD PRESSURE: 152 MMHG

## 2022-02-07 DIAGNOSIS — Z00.00 ENCOUNTER FOR GENERAL ADULT MEDICAL EXAMINATION W/OUT ABNORMAL FINDINGS: ICD-10-CM

## 2022-02-07 PROCEDURE — 99396 PREV VISIT EST AGE 40-64: CPT

## 2022-02-07 PROCEDURE — 76830 TRANSVAGINAL US NON-OB: CPT

## 2022-02-07 PROCEDURE — 82270 OCCULT BLOOD FECES: CPT

## 2022-02-08 LAB — HPV HIGH+LOW RISK DNA PNL CVX: NOT DETECTED

## 2022-02-14 LAB — CYTOLOGY CVX/VAG DOC THIN PREP: ABNORMAL

## 2022-03-01 ENCOUNTER — RX RENEWAL (OUTPATIENT)
Age: 57
End: 2022-03-01

## 2022-03-21 ENCOUNTER — APPOINTMENT (OUTPATIENT)
Dept: ENDOCRINOLOGY | Facility: CLINIC | Age: 57
End: 2022-03-21
Payer: COMMERCIAL

## 2022-03-21 VITALS
BODY MASS INDEX: 40.29 KG/M2 | WEIGHT: 236 LBS | SYSTOLIC BLOOD PRESSURE: 125 MMHG | OXYGEN SATURATION: 98 % | HEIGHT: 64 IN | DIASTOLIC BLOOD PRESSURE: 80 MMHG | HEART RATE: 73 BPM

## 2022-03-21 LAB
GLUCOSE BLDC GLUCOMTR-MCNC: 132
HBA1C MFR BLD HPLC: 8.3
LDLC SERPL DIRECT ASSAY-MCNC: 95
MICROALBUMIN/CREAT 24H UR-RTO: 18

## 2022-03-21 PROCEDURE — 82962 GLUCOSE BLOOD TEST: CPT

## 2022-03-21 PROCEDURE — 99214 OFFICE O/P EST MOD 30 MIN: CPT | Mod: 25

## 2022-03-21 NOTE — REASON FOR VISIT
[Home] : at home, [unfilled] , at the time of the visit. [Medical Office: ([facility])___] : at the medical office located in

## 2022-03-25 ENCOUNTER — APPOINTMENT (OUTPATIENT)
Dept: CARDIOLOGY | Facility: CLINIC | Age: 57
End: 2022-03-25
Payer: COMMERCIAL

## 2022-03-25 ENCOUNTER — NON-APPOINTMENT (OUTPATIENT)
Age: 57
End: 2022-03-25

## 2022-03-25 VITALS
BODY MASS INDEX: 40.8 KG/M2 | HEIGHT: 64 IN | HEART RATE: 64 BPM | RESPIRATION RATE: 14 BRPM | DIASTOLIC BLOOD PRESSURE: 70 MMHG | WEIGHT: 239 LBS | SYSTOLIC BLOOD PRESSURE: 142 MMHG

## 2022-03-25 VITALS
SYSTOLIC BLOOD PRESSURE: 142 MMHG | WEIGHT: 239 LBS | HEART RATE: 64 BPM | BODY MASS INDEX: 40.8 KG/M2 | OXYGEN SATURATION: 98 % | DIASTOLIC BLOOD PRESSURE: 70 MMHG | RESPIRATION RATE: 14 BRPM | HEIGHT: 64 IN

## 2022-03-25 DIAGNOSIS — J45.909 UNSPECIFIED ASTHMA, UNCOMPLICATED: ICD-10-CM

## 2022-03-25 DIAGNOSIS — I65.29 OCCLUSION AND STENOSIS OF UNSPECIFIED CAROTID ARTERY: ICD-10-CM

## 2022-03-25 PROCEDURE — 93000 ELECTROCARDIOGRAM COMPLETE: CPT

## 2022-03-25 PROCEDURE — 99214 OFFICE O/P EST MOD 30 MIN: CPT

## 2022-03-25 RX ORDER — METHYLPREDNISOLONE 4 MG/1
4 TABLET ORAL
Qty: 1 | Refills: 0 | Status: DISCONTINUED | COMMUNITY
Start: 2021-11-08 | End: 2022-03-25

## 2022-03-25 RX ORDER — CLOTRIMAZOLE AND BETAMETHASONE DIPROPIONATE 10; .5 MG/G; MG/G
1-0.05 CREAM TOPICAL
Qty: 1 | Refills: 0 | Status: DISCONTINUED | COMMUNITY
Start: 2021-07-21 | End: 2022-03-25

## 2022-03-25 RX ORDER — FLUCONAZOLE 150 MG/1
150 TABLET ORAL DAILY
Qty: 3 | Refills: 0 | Status: DISCONTINUED | COMMUNITY
Start: 2021-07-21 | End: 2022-03-25

## 2022-03-25 RX ORDER — ROSUVASTATIN CALCIUM 10 MG/1
10 TABLET, FILM COATED ORAL
Refills: 0 | Status: DISCONTINUED | COMMUNITY
End: 2022-03-25

## 2022-03-25 RX ORDER — GLIMEPIRIDE 2 MG/1
2 TABLET ORAL
Qty: 180 | Refills: 1 | Status: DISCONTINUED | COMMUNITY
Start: 2020-02-07 | End: 2022-03-25

## 2022-03-25 RX ORDER — FLUTICASONE FUROATE 100 UG/1
100 POWDER RESPIRATORY (INHALATION) DAILY
Refills: 0 | Status: DISCONTINUED | COMMUNITY
Start: 2018-01-15 | End: 2022-03-25

## 2022-03-25 RX ORDER — MISOPROSTOL 200 UG/1
200 TABLET ORAL
Qty: 2 | Refills: 0 | Status: DISCONTINUED | COMMUNITY
Start: 2021-06-28 | End: 2022-03-25

## 2022-03-25 NOTE — ASSESSMENT
[FreeTextEntry1] : EKG 3/25/2022: The EKG illustrates sinus rhythm, rate of 64 bpm, left atrial enlargement pattern, left anterior fascicular block, poor R wave progression across precordial leads.  Essentially unchanged.

## 2022-03-25 NOTE — PHYSICAL EXAM
[Well Developed] : well developed [No Acute Distress] : no acute distress [Obese] : obese [Normal Conjunctiva] : normal conjunctiva [Normal Venous Pressure] : normal venous pressure [Carotid Bruit] : carotid bruit [Normal S1, S2] : normal S1, S2 [No Rub] : no rub [No Gallop] : no gallop [Murmur] : murmur [Clear Lung Fields] : clear lung fields [Good Air Entry] : good air entry [No Respiratory Distress] : no respiratory distress  [Soft] : abdomen soft [Non Tender] : non-tender [No Masses/organomegaly] : no masses/organomegaly [Normal Gait] : normal gait [No Edema] : no edema [No Cyanosis] : no cyanosis [No Clubbing] : no clubbing [No Rash] : no rash [No Skin Lesions] : no skin lesions [Moves all extremities] : moves all extremities [No Focal Deficits] : no focal deficits [Normal Speech] : normal speech [Alert and Oriented] : alert and oriented [Normal memory] : normal memory [de-identified] : left [de-identified] : Grade II/VI systolic murmur [de-identified] : reproducible precordial chest and epigastric pain bilaterally with palpation.

## 2022-03-25 NOTE — DISCUSSION/SUMMARY
[FreeTextEntry1] : 1).  Patient reassured her symptoms are rather atypical and most likely not cardiac in nature. Her symptoms are occurring at rest and chest/epigastric discomfort is reproducible with palpation.  The EKG is also unchanged from prior and vitals are stable.\par \par Nevertheless, we will have her complete a Transthoracic Echocardiogram and Carotid Doppler in the near future to assess overall cardiac function and structure as well as to assess extent of carotid plaquing stenosis respectively.\par \par Her symptoms appear to be more musculoskeletal in nature with possible pulmonary component with having history of AQUILES (untreated) and history of asthma (not taking meds).  Advised she follow up with PCP/Endocrinologist and Pulmonologist to discuss management ?blood work for inflammatory conditions ?CPAP ?PFTs ?meds ?GI referral for possible endoscopy.\par \par 2).  Diet and lifestyle modification discussed including low sodium, low fat and low carbohydrate weight reducing diet.  Patient is to implement aerobic exercise regimen few days per week. \par \par 3).  Recommend patient report any untoward symptoms. \par \par 4).  Follow up with our office in 4-6 weeks or PRN.\par \par

## 2022-03-25 NOTE — REASON FOR VISIT
[FreeTextEntry1] : MICHAEL CRISOSTOMO is being seen for a clinic follow-up of. \par \par The patient is a 56-year-old white female with a known history for long-standing history of morbid obesity and prior bariatric surgery the lowest initially a moderate amount of weight and then somewhat plateaued. She presents in followup the office for general cardiac checkup. She has been treated for hypertension and hyperlipidemia ;\par \par She came in today with c/o some worsening precordial chest discomfort and epigastric discomfort for the last 3 weeks.  There has also been an increase in SOB during this time, with both symptoms occurring mostly at rest.  She is sitting in the exam room today experiencing both CP and SOB symptoms;\par \par There are also other somatic complaints with vague neck and back discomfort as well;\par \par She does have history for AQUILES (not on CPAP) and history for asthma which she does not normally use her inhaler.  She also doesn't remember straining herself carrying anything lately but she may have;\par \par Patient denies associated symptoms of diaphoresis, COY, PND, orthopnea, palpitations, dizziness or edema.

## 2022-03-25 NOTE — HISTORY OF PRESENT ILLNESS
[FreeTextEntry1] : Patient contracted COVID-19 about 3 months ago and is also concerned this could be an issue;\par \par She is tolerating her current medical regimen without difficulty\par \par She does not appear to be well motivated to lose any additional weight although she is interested in eating a more well balanced diet and begin a modest exercise regimen.\par \par Mrs. Call is following up with her PCP regularly and completing routine blood work. Her cholesterol levels are stable and her HgA1C uncontrolled with a recent level >8% (follows closely with Endocrinologist);\par \par Pulse Ox today is 98% while sitting on room air;\par \par Nuclear stress test (2018) was negative for ischemia;\par \par

## 2022-04-04 ENCOUNTER — APPOINTMENT (OUTPATIENT)
Dept: CARDIOLOGY | Facility: CLINIC | Age: 57
End: 2022-04-04

## 2022-04-11 ENCOUNTER — APPOINTMENT (OUTPATIENT)
Dept: CARDIOLOGY | Facility: CLINIC | Age: 57
End: 2022-04-11
Payer: COMMERCIAL

## 2022-04-11 PROCEDURE — 93880 EXTRACRANIAL BILAT STUDY: CPT

## 2022-04-11 PROCEDURE — 93306 TTE W/DOPPLER COMPLETE: CPT

## 2022-05-09 ENCOUNTER — APPOINTMENT (OUTPATIENT)
Dept: CARDIOLOGY | Facility: CLINIC | Age: 57
End: 2022-05-09
Payer: COMMERCIAL

## 2022-05-09 ENCOUNTER — NON-APPOINTMENT (OUTPATIENT)
Age: 57
End: 2022-05-09

## 2022-05-09 VITALS
HEIGHT: 64 IN | HEART RATE: 61 BPM | SYSTOLIC BLOOD PRESSURE: 139 MMHG | WEIGHT: 239 LBS | BODY MASS INDEX: 40.8 KG/M2 | DIASTOLIC BLOOD PRESSURE: 82 MMHG | RESPIRATION RATE: 14 BRPM

## 2022-05-09 VITALS
HEIGHT: 64 IN | SYSTOLIC BLOOD PRESSURE: 139 MMHG | RESPIRATION RATE: 14 BRPM | BODY MASS INDEX: 40.8 KG/M2 | WEIGHT: 239 LBS | DIASTOLIC BLOOD PRESSURE: 82 MMHG | HEART RATE: 61 BPM

## 2022-05-09 PROCEDURE — 93000 ELECTROCARDIOGRAM COMPLETE: CPT

## 2022-05-09 PROCEDURE — 99213 OFFICE O/P EST LOW 20 MIN: CPT

## 2022-05-09 NOTE — ASSESSMENT
[FreeTextEntry1] : EKG 5/9/2022: The EKG illustrates sinus rhythm, rate of 61 bpm, left atrial enlargement pattern, left anterior fascicular block, poor R wave progression across precordial leads. Essentially unchanged

## 2022-05-09 NOTE — HISTORY OF PRESENT ILLNESS
[FreeTextEntry1] : She is following up with her PCP regularly and completing routine blood work. Her cholesterol levels are stable and her HgA1C uncontrolled with a recent level >8% (follows closely with Endocrinologist);\par \par Patient has an appointment with her Pulmonologist in couple weeks.  Will discuss AQUILES and the need for CPAP treatment;\par \par She also completed a Carotid Doppler study and Transthoracic Echocardiogram and is back today to review those results;\par \par Carotid Doppler (4/11/2022):  There was no evidence of atherosclerotic plaquing bilaterally. There was an incidental finding for left thyroid cysts;\par \par Transthoracic Echocardiogram (4/11/2022):  Moderate dilated left atrium, mild concentric LVH and mild dilation of ascending aorta (3.7 cm).  There is impaired relaxation pattern of LV diastolic filling. Global LV systolic function is normal with LVEF of 60 to 65%. Trace MR. Trivial pericardial effsusion;\par \par Nuclear stress test (2018) was negative for ischemia;

## 2022-05-09 NOTE — REASON FOR VISIT
[FreeTextEntry1] : MICHAEL CRISOSTOMO is being seen for a clinic follow-up of. \par \par The patient is a 56-year-old white female with a known history for long-standing history of morbid obesity and prior bariatric surgery the lowest initially a moderate amount of weight and then somewhat plateaued. She presents in followup the office for general cardiac checkup. She has been treated for hypertension and hyperlipidemia ;\par \par She came to the office couple months ago with some worsening precordial chest discomfort and epigastric discomfort. There had also been an increase in SOB during that time, with both symptoms occurring mostly at rest.\par \par There had also been other somatic complaints with vague neck and back discomfort as well;\par \par She does have history for AQUILES (not on CPAP) and history for asthma which she does not normally use her inhaler;\par \par Patient now reports her symptoms of chest and neck discomfort and SOB have since completely resolved. She believes that could have been musculoskeletal in nature;\par \par Patient denies symptoms of CP, SOB, diaphoresis, COY, PND, orthopnea, palpitations, dizziness or edema.

## 2022-06-06 NOTE — HISTORY OF PRESENT ILLNESS
[Home] : at home, [unfilled] , at the time of the visit. [Medical Office: ([facility])___] : at the medical office located in  [Verbal consent obtained from patient] : the patient, [unfilled] [FreeTextEntry1] : \par HPI\par \par Follow up for \par \par T2DM on  amaryl 2 mg bid\par  MFN 1000 mg bid \par Victoza 1.8 mg qd  per PMD \par \par started 4 weeks ago\par  No low BS \par  \par no weight loss yet\par \par \par to seeopthalmology \par \par  t seen by POdiatry \par \par Hypothyroidism on LT4 0.05 mg qd \par Low Vit D on 50 K per week \par \par \par Glucose ranges:\par not testing as much ately \par  \par \par Hypoglycemia :\par 1x when did not eat ontime \par Follow ups:\par \par Opthalmology  UTD  asw opthalmology Nov 2020 \par Podiatry  \par \par \par to have polpy removal\par \par Dx with anticadiolipin antibody  by neuro   and low ANC_ to see  heamtology \par \par Renal \par \par ROS No Neck pain No voice changes  No Chest pain  No Pressure  No palpitations No dyspnea   No n/v abd pain diarrhea or constipation  rare LE edema \par \par Labs Reviewed \par Feb 2021\par \par Imaging reviewed\par \par \par

## 2022-06-10 ENCOUNTER — RX RENEWAL (OUTPATIENT)
Age: 57
End: 2022-06-10

## 2022-07-07 ENCOUNTER — RX RENEWAL (OUTPATIENT)
Age: 57
End: 2022-07-07

## 2022-08-16 ENCOUNTER — NON-APPOINTMENT (OUTPATIENT)
Age: 57
End: 2022-08-16

## 2022-08-19 LAB
HBA1C MFR BLD HPLC: 7.6
LDLC SERPL DIRECT ASSAY-MCNC: 82
TSH SERPL-ACNC: 1.28

## 2022-08-22 ENCOUNTER — RESULT CHARGE (OUTPATIENT)
Age: 57
End: 2022-08-22

## 2022-08-22 ENCOUNTER — APPOINTMENT (OUTPATIENT)
Dept: ENDOCRINOLOGY | Facility: CLINIC | Age: 57
End: 2022-08-22

## 2022-08-22 VITALS
HEIGHT: 64 IN | HEART RATE: 69 BPM | WEIGHT: 235 LBS | DIASTOLIC BLOOD PRESSURE: 82 MMHG | SYSTOLIC BLOOD PRESSURE: 132 MMHG | BODY MASS INDEX: 40.12 KG/M2

## 2022-08-22 LAB — GLUCOSE BLDC GLUCOMTR-MCNC: 172

## 2022-08-22 PROCEDURE — 99214 OFFICE O/P EST MOD 30 MIN: CPT

## 2022-08-22 RX ORDER — PEN NEEDLE, DIABETIC 32 GX 1/4"
32G X 6 MM NEEDLE, DISPOSABLE MISCELLANEOUS
Qty: 90 | Refills: 0 | Status: ACTIVE | COMMUNITY
Start: 2022-05-16 | End: 1900-01-01

## 2022-08-22 NOTE — HISTORY OF PRESENT ILLNESS
[FreeTextEntry1] : INTERVAL HISTORY: On Thursday, went to urgent care for headache and chest pressure- sent to ER. All testing ok. Also with low grade fever x 3 weeks. Symptoms believed to be viral. Was also given Zpack.\par \par Type: 2\par Severity: moderate\par Duration: over 20 years\par \par Associated symptoms-\par Last eye exam: has appointment scheduled 2022\par Last foot exam: 2022 for small tear in Achilles, mild numbness/tingling in feet. No neuropathy.\par Kidney disease: none\par Heart disease: none\par \par Modifying factors-\par Current meds for glycemic control:\par Metformin 1000 mg BID\par Victoza 1.8 mg daily (started by PCP about 6 months ago).\par \par Diet: usually eats 3 meals daily\par Weight: stable\par Exercise: walking in the pool for 30 minutes 3x per week\par \par SMBG once daily, fasting in AM. Reports BG has been higher the last few weeks, in the 170s.\par Reviewed logs. 140s to 170s in , now 160s to 170s.\par Current B, fasting\par ================================\par Hypothyroidism \par Current regimen: LT4 50 mcg daily, takes in AM with all other meds

## 2022-08-22 NOTE — ASSESSMENT
[FreeTextEntry1] : 57 year old female with T2DM, hypothyroidism, MNG, hypertension, and hyperlipidemia. Glycemic control is suboptimal.\par \par 1. T2DM- A1c improved to 7.6%, but FBG worsening most recently, possibly due to viral illness.\par -Continue Metformin.\par -Stop Victoza.\par -Start Ozempic 0.5 mg weekly.\par -Continue SMBG. If BG does not improve after a few weeks on Ozempic, call office for dose adjustment.\par -DIscussed importance of lifestyle modifications- diet, exercise, and weight loss- to improve glycemic control.\par -Repeat A1c and RTO for follow-up with MD in 3 months.\par -Keep follow-up with opthalmology for diabetic eye exam.\par \par 2. Hypothyroidism- euthyroid on replacement T4.\par -Continue current dose of LT4.\par -Repeat TFTs in 3 months.\par -Reviewed proper administration- take separate from and at least 30 minutes before all other meds/breakfast.\par \par 3. Hyperlipidemia- LDL acceptable.\par -Continue Livalo.\par -Repeat lipids in 3 months.\par \par 4. Hypertension- controlled.\par -Continue ARB.\par \par 5. MNG\par -Repeat sonogram now.\par

## 2022-08-22 NOTE — REVIEW OF SYSTEMS
[Fatigue] : fatigue [Chest Pain] : chest pain [Shortness Of Breath] : shortness of breath [Pain/Numbness of Digits] : pain/numbness of digits [Recent Weight Gain (___ Lbs)] : no recent weight gain [Recent Weight Loss (___ Lbs)] : no recent weight loss [Blurred Vision] : no blurred vision [Dysphagia] : no dysphagia [Neck Pain] : no neck pain [Palpitations] : no palpitations [Nausea] : no nausea [Abdominal Pain] : no abdominal pain [Vomiting] : no vomiting [Polyuria] : no polyuria [Tremors] : no tremors [Depression] : no depression [Anxiety] : no anxiety [Polydipsia] : no polydipsia

## 2022-09-14 ENCOUNTER — RX RENEWAL (OUTPATIENT)
Age: 57
End: 2022-09-14

## 2022-10-03 ENCOUNTER — NON-APPOINTMENT (OUTPATIENT)
Age: 57
End: 2022-10-03

## 2022-10-03 ENCOUNTER — APPOINTMENT (OUTPATIENT)
Dept: CARDIOLOGY | Facility: CLINIC | Age: 57
End: 2022-10-03

## 2022-10-03 VITALS
DIASTOLIC BLOOD PRESSURE: 80 MMHG | BODY MASS INDEX: 39.44 KG/M2 | RESPIRATION RATE: 16 BRPM | WEIGHT: 231 LBS | HEART RATE: 59 BPM | HEIGHT: 64 IN | SYSTOLIC BLOOD PRESSURE: 130 MMHG

## 2022-10-03 PROCEDURE — 93000 ELECTROCARDIOGRAM COMPLETE: CPT

## 2022-10-03 PROCEDURE — 99214 OFFICE O/P EST MOD 30 MIN: CPT

## 2022-10-03 RX ORDER — LIRAGLUTIDE 6 MG/ML
18 INJECTION SUBCUTANEOUS DAILY
Qty: 1 | Refills: 4 | Status: DISCONTINUED | COMMUNITY
Start: 2022-09-14 | End: 2022-10-03

## 2022-10-03 NOTE — PHYSICAL EXAM
[Well Developed] : well developed [No Acute Distress] : no acute distress [Obese] : obese [Normal Conjunctiva] : normal conjunctiva [Normal Venous Pressure] : normal venous pressure [No Carotid Bruit] : no carotid bruit [Normal S1, S2] : normal S1, S2 [No Rub] : no rub [No Gallop] : no gallop [Murmur] : murmur [Clear Lung Fields] : clear lung fields [Good Air Entry] : good air entry [No Respiratory Distress] : no respiratory distress  [Soft] : abdomen soft [Non Tender] : non-tender [No Masses/organomegaly] : no masses/organomegaly [Normal Gait] : normal gait [No Edema] : no edema [No Cyanosis] : no cyanosis [No Clubbing] : no clubbing [No Rash] : no rash [No Skin Lesions] : no skin lesions [Moves all extremities] : moves all extremities [No Focal Deficits] : no focal deficits [Normal Speech] : normal speech [Alert and Oriented] : alert and oriented [Normal memory] : normal memory [de-identified] : Grade II/VI systolic murmur

## 2022-10-03 NOTE — HISTORY OF PRESENT ILLNESS
[FreeTextEntry1] : \par She has been seeing a pulmonologist in [facility] for underlying asthma but despite having "mild obstructive sleep apnea" she still is not willing to try the CPAP;\par \par She also completed a Carotid Doppler study and Transthoracic Echocardiogram and is back today to review those results;\par \par Carotid Doppler (4/11/2022):  There was no evidence of atherosclerotic plaquing bilaterally. There was an incidental finding for left thyroid cysts;\par \par Transthoracic Echocardiogram (4/11/2022):  Moderate dilated left atrium, mild concentric LVH and mild dilation of ascending aorta (3.7 cm).  There is impaired relaxation pattern of LV diastolic filling. Global LV systolic function is normal with LVEF of 60 to 65%. Trace MR. Trivial pericardial effsusion;\par \par Nuclear stress test (2018) was negative for ischemia;

## 2022-10-03 NOTE — REASON FOR VISIT
[FreeTextEntry1] : MICHAEL CRISOSTOMO is being seen for a clinic follow-up of. \par \par The patient is a 57-year-old white female with a known history for long-standing history of morbid obesity and prior bariatric surgery the lowest initially a moderate amount of weight and then somewhat plateaued. She presents in followup the office for general cardiac checkup. She has been treated for hypertension and hyperlipidemia ;\par \par \par \par \par She does have history for AQUILES (not on CPAP) and history for asthma which she does not normally use her inhaler;\par \par Patient now reports her symptoms of chest and neck discomfort and SOB have since completely resolved. She believes that could have been musculoskeletal in nature;\par \par Patient denies symptoms of CP, SOB, diaphoresis, COY, PND, orthopnea, palpitations, dizziness or edema.

## 2022-10-03 NOTE — ASSESSMENT
[FreeTextEntry1] : EKG  The EKG illustrates sinus rhythm, rate of 59  bpm, left atrial enlargement pattern, left anterior fascicular block, poor R wave progression across precordial leads. Essentially unchanged

## 2022-11-13 ENCOUNTER — RX RENEWAL (OUTPATIENT)
Age: 57
End: 2022-11-13

## 2022-11-23 ENCOUNTER — NON-APPOINTMENT (OUTPATIENT)
Age: 57
End: 2022-11-23

## 2022-11-26 ENCOUNTER — RX RENEWAL (OUTPATIENT)
Age: 57
End: 2022-11-26

## 2022-11-28 ENCOUNTER — RX RENEWAL (OUTPATIENT)
Age: 57
End: 2022-11-28

## 2022-11-28 RX ORDER — PEN NEEDLE, DIABETIC 29 G X1/2"
32G X 4 MM NEEDLE, DISPOSABLE MISCELLANEOUS
Qty: 100 | Refills: 0 | Status: ACTIVE | COMMUNITY
Start: 2022-11-28 | End: 1900-01-01

## 2023-01-11 ENCOUNTER — RX RENEWAL (OUTPATIENT)
Age: 58
End: 2023-01-11

## 2023-01-27 LAB
HBA1C MFR BLD HPLC: 7.6
LDLC SERPL DIRECT ASSAY-MCNC: 78
MICROALBUMIN/CREAT 24H UR-RTO: 9
TSH SERPL-ACNC: 0.65

## 2023-01-30 ENCOUNTER — APPOINTMENT (OUTPATIENT)
Dept: ENDOCRINOLOGY | Facility: CLINIC | Age: 58
End: 2023-01-30
Payer: COMMERCIAL

## 2023-01-30 VITALS
BODY MASS INDEX: 38.93 KG/M2 | HEART RATE: 76 BPM | HEIGHT: 64 IN | DIASTOLIC BLOOD PRESSURE: 80 MMHG | OXYGEN SATURATION: 96 % | SYSTOLIC BLOOD PRESSURE: 128 MMHG | WEIGHT: 228 LBS

## 2023-01-30 LAB — GLUCOSE BLDC GLUCOMTR-MCNC: 138

## 2023-01-30 PROCEDURE — 99214 OFFICE O/P EST MOD 30 MIN: CPT

## 2023-01-30 PROCEDURE — 82962 GLUCOSE BLOOD TEST: CPT

## 2023-01-30 NOTE — PHYSICAL EXAM
[Alert] : alert [Well Nourished] : well nourished [No Acute Distress] : no acute distress [Well Developed] : well developed [Normal Sclera/Conjunctiva] : normal sclera/conjunctiva [EOMI] : extra ocular movement intact [No Proptosis] : no proptosis [Thyroid Not Enlarged] : the thyroid was not enlarged [No Thyroid Nodules] : no palpable thyroid nodules [No Respiratory Distress] : no respiratory distress [No Accessory Muscle Use] : no accessory muscle use [Clear to Auscultation] : lungs were clear to auscultation bilaterally [Normal S1, S2] : normal S1 and S2 [Normal Rate] : heart rate was normal [Regular Rhythm] : with a regular rhythm [No Edema] : no peripheral edema [Normal Anterior Cervical Nodes] : no anterior cervical lymphadenopathy [No Tremors] : no tremors [Oriented x3] : oriented to person, place, and time [Normal Affect] : the affect was normal [Normal Mood] : the mood was normal [Acanthosis Nigricans] : no acanthosis nigricans

## 2023-01-30 NOTE — HISTORY OF PRESENT ILLNESS
[FreeTextEntry1] : INTERVAL HISTORY:  has started o exercise  s helped BS \par Type: 2\par Severity: moderate\par Duration: over 20 years\par \par Associated symptoms-\par Last eye exam: has appointment scheduled 2022\par Last foot exam: 2022 for small tear in Achilles, mild numbness/tingling in feet+ burnign sensaitonin feet \par Kidney disease: none\par Heart disease: none\par \par Modifying factors-\par Current meds for glycemic control:\par Metformin 1000 mg BID\par ozempic 1mg qweek.\par \par Diet: usually eats 3 meals daily\par Weight: stable\par Exercise: walking \par \par SMBG once daily, fasting in AM. Reports BG has been higher the last few weeks, in the 170s.\par Reviewed logs. 140s to 170s in , now 160s to 170s.\par Current B, fasting\par ================================\par Hypothyroidism \par Current regimen: LT4 50 mcg daily, takes in AM with all other meds

## 2023-01-30 NOTE — ASSESSMENT
[FreeTextEntry1] : 57 year old female with T2DM, hypothyroidism, MNG, hypertension, and hyperlipidemia. Glycemic control is suboptimal.\par \par 1. T2DM- A1c improved to 7.6%, just started to exercsie \par -Continue Metformin.\par cont ozmepic 0.5 mg qweek \par may need 2 mg dose \par -DIscussed importance of lifestyle modifications- diet, exercise, and weight loss- to improve glycemic control.\par -Repeat A1c and RTO for follow-up with MD in 3 months.\par -Keep follow-up with opthalmology for diabetic eye exam.\par \par 2. Hypothyroidism- euthyroid on replacement T4.\par -Continue current dose of LT4.\par -Repeat TFTs in 3 months.\par -Reviewed proper administration- take separate from and at least 30 minutes before all other meds/breakfast.\par \par 3. Hyperlipidemia- LDL acceptable.\par -Continue Livalo.\par -Repeat lipids in 3 months.\par \par 4. Hypertension- controlled.\par -Continue ARB.\par \par 5. MNG\par overall ok- will repeat  this year in Dec\par \par 6 Occ Cp- see cardiology - sooner than next month\par 7Burning feet - can see podiatry first then  if no answer neuroloy ppossibly diabetic neuropathy\par

## 2023-02-01 ENCOUNTER — RX RENEWAL (OUTPATIENT)
Age: 58
End: 2023-02-01

## 2023-02-01 RX ORDER — ERGOCALCIFEROL 1.25 MG/1
1.25 MG CAPSULE, LIQUID FILLED ORAL
Qty: 12 | Refills: 0 | Status: ACTIVE | COMMUNITY
Start: 2019-11-27 | End: 1900-01-01

## 2023-02-03 ENCOUNTER — APPOINTMENT (OUTPATIENT)
Dept: CARDIOLOGY | Facility: CLINIC | Age: 58
End: 2023-02-03
Payer: COMMERCIAL

## 2023-02-03 ENCOUNTER — NON-APPOINTMENT (OUTPATIENT)
Age: 58
End: 2023-02-03

## 2023-02-03 VITALS
DIASTOLIC BLOOD PRESSURE: 82 MMHG | SYSTOLIC BLOOD PRESSURE: 158 MMHG | BODY MASS INDEX: 38.76 KG/M2 | HEIGHT: 64 IN | WEIGHT: 227 LBS | HEART RATE: 62 BPM | RESPIRATION RATE: 16 BRPM

## 2023-02-03 DIAGNOSIS — R07.89 OTHER CHEST PAIN: ICD-10-CM

## 2023-02-03 PROCEDURE — 99214 OFFICE O/P EST MOD 30 MIN: CPT | Mod: 25

## 2023-02-03 PROCEDURE — 93000 ELECTROCARDIOGRAM COMPLETE: CPT

## 2023-02-03 NOTE — HISTORY OF PRESENT ILLNESS
[FreeTextEntry1] : \par She has been seeing a pulmonologist in [facility] for underlying asthma but despite having "mild obstructive sleep apnea" she still is not willing to try the CPAP; she has not however looked into a "oral appliance";;\par \par She also completed a Carotid Doppler study and Transthoracic Echocardiogram and is back today to review those results;\par \par Carotid Doppler (4/11/2022):  There was no evidence of atherosclerotic plaquing bilaterally. There was an incidental finding for left thyroid cysts;\par \par Transthoracic Echocardiogram (4/11/2022):  Moderate dilated left atrium, mild concentric LVH and mild dilation of ascending aorta (3.7 cm).  There is impaired relaxation pattern of LV diastolic filling. Global LV systolic function is normal with LVEF of 60 to 65%. Trace MR. Trivial pericardial effsusion;\par \par Nuclear stress test (2018) was negative for ischemia;

## 2023-02-03 NOTE — REASON FOR VISIT
[FreeTextEntry1] : Erik CRISOSTOMO is being seen for a clinic follow-up of. \par \par The patient is a 57-year-old white female with a known history for long-standing history of morbid obesity and prior bariatric surgery the lowest initially a moderate amount of weight and then somewhat plateaued. She presents in followup the office for general cardiac checkup. She has been treated for hypertension and hyperlipidemia ;\par \par \par She does have history for AQUILES (not on CPAP) and history for asthma which she does not normally use her inhaler;\par \par Patient has been getting occasional twinges of chest discomfort on and off -and occasional fleeting palpitations in the chest; -but denies any significant SOB, diaphoresis, COY, PND, orthopnea;

## 2023-02-03 NOTE — ASSESSMENT
[FreeTextEntry1] : EKG  The EKG illustrates sinus rhythm, rate of 62  bpm, left atrial enlargement pattern, left anterior fascicular block, poor R wave progression across precordial leads. Essentially unchanged\par \par \par In summary, this 57-year-old white female has a history for obesity, hypertension which has been under reasonable control, AQUILES, (no CPAP in use), who has had some intermittent chest discomfort and occasional fleeting palpitations in the chest of recent and carries some significant cardiac risk factors\par ;

## 2023-02-03 NOTE — DISCUSSION/SUMMARY
[FreeTextEntry1] : Plan:\par \par Recommend patient schedule nuclear stress test sometime in the near future to rule out any significant evidence for exercise-induced arrhythmia or any coronary ischemia;\par \par \par Diet and lifestyle modification discussed including low sodium, low fat and low carbohydrate weight reducing diet.  Patient is to implement aerobic exercise regimen few days per week. \par \par Incidental finding of thyroid cysts on echo.  Follow up with her Endocrinologist to discuss-and reports that she is scheduled for thyroid scan in the near future;\par \par 2).  Return to office within 4 to 5 months or as needed;\par \par 3).  Recommend patient report any untoward symptoms. \par

## 2023-02-03 NOTE — PHYSICAL EXAM
[Well Developed] : well developed [Well Nourished] : well nourished [No Acute Distress] : no acute distress [Obese] : obese [Normal Conjunctiva] : normal conjunctiva [Normal Venous Pressure] : normal venous pressure [No Carotid Bruit] : no carotid bruit [Normal S1, S2] : normal S1, S2 [No Murmur] : no murmur [No Rub] : no rub [No Gallop] : no gallop [Murmur] : murmur [Clear Lung Fields] : clear lung fields [Good Air Entry] : good air entry [No Respiratory Distress] : no respiratory distress  [Soft] : abdomen soft [Non Tender] : non-tender [No Masses/organomegaly] : no masses/organomegaly [Normal Bowel Sounds] : normal bowel sounds [Normal Gait] : normal gait [No Edema] : no edema [No Cyanosis] : no cyanosis [No Clubbing] : no clubbing [No Varicosities] : no varicosities [No Rash] : no rash [No Skin Lesions] : no skin lesions [Moves all extremities] : moves all extremities [No Focal Deficits] : no focal deficits [Normal Speech] : normal speech [Alert and Oriented] : alert and oriented [Normal memory] : normal memory [de-identified] : Grade II/VI systolic murmur

## 2023-02-20 ENCOUNTER — RX RENEWAL (OUTPATIENT)
Age: 58
End: 2023-02-20

## 2023-02-23 ENCOUNTER — RX RENEWAL (OUTPATIENT)
Age: 58
End: 2023-02-23

## 2023-03-06 ENCOUNTER — APPOINTMENT (OUTPATIENT)
Dept: CARDIOLOGY | Facility: CLINIC | Age: 58
End: 2023-03-06

## 2023-03-13 ENCOUNTER — APPOINTMENT (OUTPATIENT)
Dept: CARDIOLOGY | Facility: CLINIC | Age: 58
End: 2023-03-13

## 2023-03-18 ENCOUNTER — APPOINTMENT (OUTPATIENT)
Dept: CARDIOLOGY | Facility: CLINIC | Age: 58
End: 2023-03-18
Payer: COMMERCIAL

## 2023-03-18 ENCOUNTER — MED ADMIN CHARGE (OUTPATIENT)
Age: 58
End: 2023-03-18

## 2023-03-18 PROCEDURE — 78452 HT MUSCLE IMAGE SPECT MULT: CPT

## 2023-03-18 PROCEDURE — 93015 CV STRESS TEST SUPVJ I&R: CPT

## 2023-03-18 PROCEDURE — A9500: CPT

## 2023-03-18 RX ADMIN — REGADENOSON 5 MG/5ML: 0.08 INJECTION, SOLUTION INTRAVENOUS at 00:00

## 2023-03-22 RX ORDER — REGADENOSON 0.08 MG/ML
0.4 INJECTION, SOLUTION INTRAVENOUS
Qty: 4 | Refills: 0 | Status: COMPLETED | OUTPATIENT
Start: 2023-03-18

## 2023-03-24 ENCOUNTER — APPOINTMENT (OUTPATIENT)
Dept: CARDIOLOGY | Facility: CLINIC | Age: 58
End: 2023-03-24

## 2023-03-27 DIAGNOSIS — R94.39 ABNORMAL RESULT OF OTHER CARDIOVASCULAR FUNCTION STUDY: ICD-10-CM

## 2023-04-07 RX ORDER — KIT FOR THE PREPARATION OF TECHNETIUM TC99M SESTAMIBI 1 MG/5ML
INJECTION, POWDER, LYOPHILIZED, FOR SOLUTION PARENTERAL
Refills: 0 | Status: COMPLETED | OUTPATIENT
Start: 2023-04-07

## 2023-04-07 RX ADMIN — KIT FOR THE PREPARATION OF TECHNETIUM TC99M SESTAMIBI 0: 1 INJECTION, POWDER, LYOPHILIZED, FOR SOLUTION PARENTERAL at 00:00

## 2023-04-13 NOTE — H&P PST ADULT - HISTORY OF PRESENT ILLNESS
58y/o female with history of DM, HTN, HLD hypothyroid, AQUILES     Symptoms:        Angina (Class):        Ischemic Symptoms:     Heart Failure: N/A    Assessment of LVEF:       EF: 69%       Assessed by: NST       Date: 3/18/2023    Prior Cardiac Interventions:       PCI's: N/A       CABG: N/A    Noninvasive Testing:   Stress Test: 3/18/2023       Protocol: Lexiscan       Symptoms: SOB       EKG Changes: Negative       Myocardial Imaging: A small-moderate area of mild-moderate, mid apical anterior wall ischemia.       Risk Assessment: Low    Echo: 4/11/2022  Left Ventricle: The left ventricular internal cavity size was normal. Mild concentric left ventricular hypertrophy. Global LV systolic function was normal. Left ventricular ejection fraction, by visual estimation, is 60 to 65%. LV regional wall motion is normal Spectral Doppler shows impaired relaxation pattern of LV diastolic filling.  Left Atrium: The left atrium is moderately dilated. Calculated LA volume is 42.4 ml/m2.  Right Ventricle: The right ventricular size is normal.  Right Atrium: The right atrium is normal in size and structure.  Mitral Valve: There is mild thickening and calcification of the anterior and posterior mitral valve leaflets. Mild to moderate mitral annular calcification. No evidence of mitral valve stenosis. Trace mitral valve regurgitation is seen.  Tricuspid Valve: The tricuspid valve is normal. Trivial tricuspid regurgitation is visualized. The tricuspid regurgitant velocity is 2.40 m/s, and with an assumed right atrial pressure of 10 mmHg, the estimated right ventricular systolic pressure is normal at 33.0 mmHg.  Aortic Valve: The aortic valve is tricuspid. Mild to moderate aortic valve sclerosis/calcification is present, without any evidence of aortic stenosis.  Pulmonic Valve: The pulmonic valve is not well seen.  Aorta: The aortic root is normal in size and structure. There is mild dilatation of the ascending aorta.  Pulmonary Artery: The pulmonary artery is not well seen.  Pericardium: There is a trivial pericardial effusion.  Venous: The inferior vena cava was normal    B/L Carotid Artery Dopplers:   Left Carotid: No evidence of atherosclerotic plaque with normal velocities and spectral doppler waveforms. The left vertebral artery demonstrates antegrade flow. Partially cysts nodule noted in left thyroid measuring 1.4x0.7cm.  Consider dedicated imaging as clinically indicated.  Right Carotid: No evidence of atherosclerotic plaque with normal velocities and spectral doppler waveforms. The right vertebral artery demonstrates antegrade flow.    Lipids: 4/7/2023       Total Cholesterol: 165       Triglycerides: 94       HDL: 60       Non-HDL: 105       LDL: 86    HgbA1C: 6.4%    Antianginal Therapies:        Beta Blockers: N/A       Calcium Channel Blockers: N/A       Long Acting Nitrates: N/A       Ranexa: N/A    Associated Risk Factors:        Frailty: N/A       Cerebrovascular Disease: N/A       Chronic Lung Disease: N/A       Peripheral Arterial Disease: N/A       Chronic Kidney Disease (if yes, what is GFR): N/A       Uncontrolled Diabetes (if yes, what is HgbA1C or FBS): N/A       Poorly Controlled Hypertension (if yes, what is SBP): N/A       Morbid Obesity (if yes, what is BMI): N/A       History of Recent Ventricular Arrhythmia: N/A       Inability to Ambulate Safely: N/A       Need for Therapeutic Anticoagulation: N/A       Antiplatelet or Contrast Allergy: N/A    Social History:        Marital:        Tobacco:        ETOH:        Caffeine:     ROS:   General: No fevers/chills. No fatigue  HEENT: No hearing loss. No visual disturbances. No headaches. No epistaxis.  Pulmonary: No dyspnea. No wheeze. No cough.  CV: No chest pain. No COY. No palpitations. No orthopnea. No PND. No edema.  GI: No BRBPR. No melena. No nausea.  : No hematuria.  Neuro: No weakness. No paresthesia. No syncope.    T(C): --  HR: --  BP: --  RR: --  SpO2: --  Physical Exam:   General: Awake, alert, speech clear, no acute distress.  Neck: No bruit, no JVD.  Chest: S1, S2. No murmur. CTA.  Abdomen: Soft. Nondistended.  Extremities: No edema. Pulses: DP: Right: 2+, Left: 2+, Radial: Right: 2+, Left: 2+ 58y/o female with history of DM, HTN, HLD hypothyroid, AQUILES, anticardiolipin syndrome who has been having occasional episodes of left sided, nonexertional chest discomfort, described as a cramping sensation. She had a nuclear stress test which showed a small-moderate area of mild-moderate, mid apical anterior wall ischemia.    Symptoms:        Angina (Class): 4       Ischemic Symptoms: Case (CCS class 2 anginal equivalent)    Heart Failure: N/A    Assessment of LVEF:       EF: 69%       Assessed by: NST       Date: 3/18/2023    Prior Cardiac Interventions:       PCI's: N/A       CABG: N/A    Noninvasive Testing:   Stress Test: 3/18/2023       Protocol: Lexiscan       Symptoms: SOB       EKG Changes: Negative       Myocardial Imaging: A small-moderate area of mild-moderate, mid apical anterior wall ischemia.       Risk Assessment: Low    Echo: 4/11/2022  Left Ventricle: The left ventricular internal cavity size was normal. Mild concentric left ventricular hypertrophy. Global LV systolic function was normal. Left ventricular ejection fraction, by visual estimation, is 60 to 65%. LV regional wall motion is normal Spectral Doppler shows impaired relaxation pattern of LV diastolic filling.  Left Atrium: The left atrium is moderately dilated. Calculated LA volume is 42.4 ml/m2.  Right Ventricle: The right ventricular size is normal.  Right Atrium: The right atrium is normal in size and structure.  Mitral Valve: There is mild thickening and calcification of the anterior and posterior mitral valve leaflets. Mild to moderate mitral annular calcification. No evidence of mitral valve stenosis. Trace mitral valve regurgitation is seen.  Tricuspid Valve: The tricuspid valve is normal. Trivial tricuspid regurgitation is visualized. The tricuspid regurgitant velocity is 2.40 m/s, and with an assumed right atrial pressure of 10 mmHg, the estimated right ventricular systolic pressure is normal at 33.0 mmHg.  Aortic Valve: The aortic valve is tricuspid. Mild to moderate aortic valve sclerosis/calcification is present, without any evidence of aortic stenosis.  Pulmonic Valve: The pulmonic valve is not well seen.  Aorta: The aortic root is normal in size and structure. There is mild dilatation of the ascending aorta.  Pulmonary Artery: The pulmonary artery is not well seen.  Pericardium: There is a trivial pericardial effusion.  Venous: The inferior vena cava was normal    B/L Carotid Artery Dopplers:   Left Carotid: No evidence of atherosclerotic plaque with normal velocities and spectral doppler waveforms. The left vertebral artery demonstrates antegrade flow. Partially cysts nodule noted in left thyroid measuring 1.4x0.7cm.  Consider dedicated imaging as clinically indicated.  Right Carotid: No evidence of atherosclerotic plaque with normal velocities and spectral doppler waveforms. The right vertebral artery demonstrates antegrade flow.    Lipids: 4/7/2023       Total Cholesterol: 165       Triglycerides: 94       HDL: 60       Non-HDL: 105       LDL: 86    HgbA1C: 6.4%    Antianginal Therapies:        Beta Blockers: N/A       Calcium Channel Blockers: N/A       Long Acting Nitrates: N/A       Ranexa: N/A    Associated Risk Factors:        Frailty: N/A       Cerebrovascular Disease: N/A       Chronic Lung Disease: N/A       Peripheral Arterial Disease: N/A       Chronic Kidney Disease (if yes, what is GFR): N/A       Uncontrolled Diabetes (if yes, what is HgbA1C or FBS): N/A       Poorly Controlled Hypertension (if yes, what is SBP): N/A       Morbid Obesity (if yes, what is BMI): N/A       History of Recent Ventricular Arrhythmia: N/A       Inability to Ambulate Safely: N/A       Need for Therapeutic Anticoagulation: N/A       Antiplatelet or Contrast Allergy: N/A    Social History:        Marital: , lives alone       Tobacco: Never smoker       ETOH: Occasional       Caffeine: 1 cup coffee daily    ROS:   General: No fevers/chills. + fatigue  HEENT: No hearing loss. No visual disturbances. No headaches. No epistaxis.  Pulmonary: No dyspnea. No wheeze. No cough.  CV: + chest pain. + mild CASE. Occasional palpitations. No orthopnea. No PND. No edema.  GI: No BRBPR. No melena. No nausea.  : No hematuria.  Neuro: No weakness. No paresthesia. No syncope.    T(C): --  HR: --  BP: --  RR: --  SpO2: --  Physical Exam:   General: Awake, alert, speech clear, no acute distress.  Neck: No bruit, no JVD.  Chest: S1, S2. No murmur. CTA.  Abdomen: Soft. Nondistended.  Extremities: No edema. Pulses: DP: Right: 2+, Left: 2+, Radial: Right: 2+, Left: 2+ 58y/o female with history of DM, HTN, HLD hypothyroid, AQUILES, anticardiolipin syndrome who has been having occasional episodes of left sided, nonexertional chest discomfort, described as a cramping sensation. She had a nuclear stress test which showed a small-moderate area of mild-moderate, mid apical anterior wall ischemia.    Symptoms:        Angina (Class): 4       Ischemic Symptoms: Case (CCS class 2 anginal equivalent)    Heart Failure: N/A    Assessment of LVEF:       EF: 69%       Assessed by: NST       Date: 3/18/2023    Prior Cardiac Interventions:       PCI's: N/A       CABG: N/A    Noninvasive Testing:   Stress Test: 3/18/2023       Protocol: Lexiscan       Symptoms: SOB       EKG Changes: Negative       Myocardial Imaging: A small-moderate area of mild-moderate, mid apical anterior wall ischemia.       Risk Assessment: Low    Echo: 4/11/2022  Left Ventricle: The left ventricular internal cavity size was normal. Mild concentric left ventricular hypertrophy. Global LV systolic function was normal. Left ventricular ejection fraction, by visual estimation, is 60 to 65%. LV regional wall motion is normal Spectral Doppler shows impaired relaxation pattern of LV diastolic filling.  Left Atrium: The left atrium is moderately dilated. Calculated LA volume is 42.4 ml/m2.  Right Ventricle: The right ventricular size is normal.  Right Atrium: The right atrium is normal in size and structure.  Mitral Valve: There is mild thickening and calcification of the anterior and posterior mitral valve leaflets. Mild to moderate mitral annular calcification. No evidence of mitral valve stenosis. Trace mitral valve regurgitation is seen.  Tricuspid Valve: The tricuspid valve is normal. Trivial tricuspid regurgitation is visualized. The tricuspid regurgitant velocity is 2.40 m/s, and with an assumed right atrial pressure of 10 mmHg, the estimated right ventricular systolic pressure is normal at 33.0 mmHg.  Aortic Valve: The aortic valve is tricuspid. Mild to moderate aortic valve sclerosis/calcification is present, without any evidence of aortic stenosis.  Pulmonic Valve: The pulmonic valve is not well seen.  Aorta: The aortic root is normal in size and structure. There is mild dilatation of the ascending aorta.  Pulmonary Artery: The pulmonary artery is not well seen.  Pericardium: There is a trivial pericardial effusion.  Venous: The inferior vena cava was normal    B/L Carotid Artery Dopplers:   Left Carotid: No evidence of atherosclerotic plaque with normal velocities and spectral doppler waveforms. The left vertebral artery demonstrates antegrade flow. Partially cysts nodule noted in left thyroid measuring 1.4x0.7cm.  Consider dedicated imaging as clinically indicated.  Right Carotid: No evidence of atherosclerotic plaque with normal velocities and spectral doppler waveforms. The right vertebral artery demonstrates antegrade flow.    Lipids: 4/7/2023       Total Cholesterol: 165       Triglycerides: 94       HDL: 60       Non-HDL: 105       LDL: 86    HgbA1C: 6.4%    Antianginal Therapies:        Beta Blockers: N/A       Calcium Channel Blockers: N/A       Long Acting Nitrates: N/A       Ranexa: N/A    Associated Risk Factors:        Frailty: N/A       Cerebrovascular Disease: N/A       Chronic Lung Disease: N/A       Peripheral Arterial Disease: N/A       Chronic Kidney Disease (if yes, what is GFR): N/A       Uncontrolled Diabetes (if yes, what is HgbA1C or FBS): N/A       Poorly Controlled Hypertension (if yes, what is SBP): N/A       Morbid Obesity (if yes, what is BMI): N/A       History of Recent Ventricular Arrhythmia: N/A       Inability to Ambulate Safely: N/A       Need for Therapeutic Anticoagulation: N/A       Antiplatelet or Contrast Allergy: N/A    Social History:        Marital: , lives alone       Tobacco: Never smoker       ETOH: Occasional       Caffeine: 1 cup coffee daily    ROS:   General: No fevers/chills. + fatigue  HEENT: No hearing loss. No visual disturbances. No headaches. No epistaxis.  Pulmonary: No dyspnea. No wheeze. No cough.  CV: + chest pain. + mild CASE. Occasional palpitations. No orthopnea. No PND. No edema.  GI: No BRBPR. No melena. No nausea.  : No hematuria.  Neuro: No weakness. No paresthesia. No syncope.    T(C): 36.7 (04-14-23 @ 14:41), Max: 36.7 (04-14-23 @ 14:41)  HR: 61 (04-14-23 @ 14:41)  BP: 176/79 (04-14-23 @ 14:41)  RR: 18 (04-14-23 @ 14:41)  SpO2: 100% (04-14-23 @ 14:41)  Physical Exam:   General: Awake, alert, speech clear, no acute distress.  Neck: No bruit, no JVD.  Chest: S1, S2. No murmur. CTA.  Abdomen: Soft. Nondistended.  Extremities: No edema. Pulses: DP: Right: 2+, Left: 2+, Radial: Right: 2+, Left: 2+

## 2023-04-13 NOTE — H&P PST ADULT - OTHER CARE PROVIDERS
Rajeev Lagunas ([facility], Cardiology at [place], [address], [place] [address], Phone: [phone], Fax: [phone])

## 2023-04-13 NOTE — H&P PST ADULT - ASSESSMENT
Assessment:     ASA:   Mall:   ABR:   COVID:   GFR:   Creatinine:   10 Year ASCVD Risk: 6.3%    Problem List:   1.   ·	LHC and possible intervention. Consent obtained.  ·	Procedure explained and questions answered.   ·	Will start DAPT if PCI performed.  ·	IV:  mL IV over 1 hour pre and post procedure  ·	Aspirin if not taken today.    2. HLD  ·	Goal Non-HDL < 100, LDL < 70  ·	Lipid Panel:   ·	Statin:   ·	Other:     3. DM2  ·	GDMT:   ·	     Diabetic diet.  ·	     FS Q AC and HS with coverage  ·	     HgbA1C:   ·	     Total Insulin Requirements Past 24 Hours:   ·	     Basal Insulin:   ·	     Nutritional Insulin:   ·	     Oral Antihyperglycemics:   ·	     SGLT2i/GLP1-RA:     Discharge Planning:   ·	Discharge today if no PCI performed.  ·	Discharge in AM if PCI performed. Assessment: 56y/o female with history of DM, HTN, HLD hypothyroid, AQUILES, anticardiolipin syndrome who has been having occasional episodes of left sided, nonexertional chest discomfort, described as a cramping sensation. She had a nuclear stress test which showed a small-moderate area of mild-moderate, mid apical anterior wall ischemia.    ASA: 3  Mall: 2  ABR: 1.3%  GFR: 100  Creatinine: 0.7  10 Year ASCVD Risk: 6.3%    Problem List:   1. Sable angina with abnormal nuclear stress test  ·	[facility] and possible intervention. Consent obtained.  ·	Procedure explained and questions answered.   ·	Will start DAPT if PCI performed.  ·	IV:  mL IV over 1 hour pre and post procedure  ·	Aspirin if not taken today.    2. HLD  ·	Goal Non-HDL < 100, LDL < 70  ·	Lipid Panel: Not at goal on 4/7/2023, will adjust goal based on [facility] results.  ·	Statin:   ·	Other: N/A    3. DM2  ·	GDMT:   ·	     Diabetic diet.  ·	     FS Q AC and HS with coverage  ·	     HgbA1C: 6.4% on 4/7/2023  ·	     Total Insulin Requirements Past 24 Hours:   ·	     Basal Insulin: N/A  ·	     Nutritional Insulin: N/A  ·	     Oral Antihyperglycemics:   ·	     SGLT2i/GLP1-RA:     Discharge Planning:   ·	Discharge today if no PCI performed.  ·	Discharge in AM if PCI performed.

## 2023-04-13 NOTE — H&P PST ADULT - NSICDXPASTMEDICALHX_GEN_ALL_CORE_FT
PAST MEDICAL HISTORY:  Asthma well controlled    Depression     Hypokalemia     Hypothyroid     Morbidly obese     AQUILES (obstructive sleep apnea)     Primary hypertension     Pulmonary hypertension     Pure hypercholesterolemia     Type 2 diabetes mellitus without complication, without long-term current use of insulin

## 2023-04-14 ENCOUNTER — TRANSCRIPTION ENCOUNTER (OUTPATIENT)
Age: 58
End: 2023-04-14

## 2023-04-14 ENCOUNTER — OUTPATIENT (OUTPATIENT)
Dept: OUTPATIENT SERVICES | Facility: HOSPITAL | Age: 58
LOS: 1 days | Discharge: HOME | End: 2023-04-14
Payer: COMMERCIAL

## 2023-04-14 VITALS
DIASTOLIC BLOOD PRESSURE: 79 MMHG | SYSTOLIC BLOOD PRESSURE: 176 MMHG | TEMPERATURE: 98 F | RESPIRATION RATE: 18 BRPM | OXYGEN SATURATION: 100 % | HEART RATE: 61 BPM

## 2023-04-14 VITALS
OXYGEN SATURATION: 97 % | SYSTOLIC BLOOD PRESSURE: 126 MMHG | RESPIRATION RATE: 20 BRPM | DIASTOLIC BLOOD PRESSURE: 73 MMHG | HEART RATE: 75 BPM

## 2023-04-14 DIAGNOSIS — Z98.84 BARIATRIC SURGERY STATUS: Chronic | ICD-10-CM

## 2023-04-14 DIAGNOSIS — Z87.59 PERSONAL HISTORY OF OTHER COMPLICATIONS OF PREGNANCY, CHILDBIRTH AND THE PUERPERIUM: Chronic | ICD-10-CM

## 2023-04-14 DIAGNOSIS — R94.39 ABNORMAL RESULT OF OTHER CARDIOVASCULAR FUNCTION STUDY: ICD-10-CM

## 2023-04-14 DIAGNOSIS — Z90.89 ACQUIRED ABSENCE OF OTHER ORGANS: Chronic | ICD-10-CM

## 2023-04-14 LAB
ANION GAP SERPL CALC-SCNC: 12 MMOL/L — SIGNIFICANT CHANGE UP (ref 5–17)
BASOPHILS # BLD AUTO: 0.02 K/UL — SIGNIFICANT CHANGE UP (ref 0–0.2)
BASOPHILS NFR BLD AUTO: 0.4 % — SIGNIFICANT CHANGE UP (ref 0–2)
BUN SERPL-MCNC: 12.9 MG/DL — SIGNIFICANT CHANGE UP (ref 8–20)
CALCIUM SERPL-MCNC: 9.2 MG/DL — SIGNIFICANT CHANGE UP (ref 8.4–10.5)
CHLORIDE SERPL-SCNC: 102 MMOL/L — SIGNIFICANT CHANGE UP (ref 96–108)
CO2 SERPL-SCNC: 28 MMOL/L — SIGNIFICANT CHANGE UP (ref 22–29)
CREAT SERPL-MCNC: 0.56 MG/DL — SIGNIFICANT CHANGE UP (ref 0.5–1.3)
EGFR: 105 ML/MIN/1.73M2 — SIGNIFICANT CHANGE UP
EOSINOPHIL # BLD AUTO: 0.11 K/UL — SIGNIFICANT CHANGE UP (ref 0–0.5)
EOSINOPHIL NFR BLD AUTO: 2.2 % — SIGNIFICANT CHANGE UP (ref 0–6)
GLUCOSE SERPL-MCNC: 126 MG/DL — HIGH (ref 70–99)
HCT VFR BLD CALC: 38.2 % — SIGNIFICANT CHANGE UP (ref 34.5–45)
HGB BLD-MCNC: 13.1 G/DL — SIGNIFICANT CHANGE UP (ref 11.5–15.5)
IMM GRANULOCYTES NFR BLD AUTO: 0.2 % — SIGNIFICANT CHANGE UP (ref 0–0.9)
LYMPHOCYTES # BLD AUTO: 2.05 K/UL — SIGNIFICANT CHANGE UP (ref 1–3.3)
LYMPHOCYTES # BLD AUTO: 40.2 % — SIGNIFICANT CHANGE UP (ref 13–44)
MAGNESIUM SERPL-MCNC: 1.6 MG/DL — SIGNIFICANT CHANGE UP (ref 1.6–2.6)
MCHC RBC-ENTMCNC: 30.5 PG — SIGNIFICANT CHANGE UP (ref 27–34)
MCHC RBC-ENTMCNC: 34.3 GM/DL — SIGNIFICANT CHANGE UP (ref 32–36)
MCV RBC AUTO: 88.8 FL — SIGNIFICANT CHANGE UP (ref 80–100)
MONOCYTES # BLD AUTO: 0.52 K/UL — SIGNIFICANT CHANGE UP (ref 0–0.9)
MONOCYTES NFR BLD AUTO: 10.2 % — SIGNIFICANT CHANGE UP (ref 2–14)
NEUTROPHILS # BLD AUTO: 2.39 K/UL — SIGNIFICANT CHANGE UP (ref 1.8–7.4)
NEUTROPHILS NFR BLD AUTO: 46.8 % — SIGNIFICANT CHANGE UP (ref 43–77)
PLATELET # BLD AUTO: 141 K/UL — LOW (ref 150–400)
POTASSIUM SERPL-MCNC: 3.3 MMOL/L — LOW (ref 3.5–5.3)
POTASSIUM SERPL-SCNC: 3.3 MMOL/L — LOW (ref 3.5–5.3)
RBC # BLD: 4.3 M/UL — SIGNIFICANT CHANGE UP (ref 3.8–5.2)
RBC # FLD: 12.7 % — SIGNIFICANT CHANGE UP (ref 10.3–14.5)
SODIUM SERPL-SCNC: 142 MMOL/L — SIGNIFICANT CHANGE UP (ref 135–145)
WBC # BLD: 5.1 K/UL — SIGNIFICANT CHANGE UP (ref 3.8–10.5)
WBC # FLD AUTO: 5.1 K/UL — SIGNIFICANT CHANGE UP (ref 3.8–10.5)

## 2023-04-14 PROCEDURE — C1769: CPT

## 2023-04-14 PROCEDURE — 83735 ASSAY OF MAGNESIUM: CPT

## 2023-04-14 PROCEDURE — 93458 L HRT ARTERY/VENTRICLE ANGIO: CPT

## 2023-04-14 PROCEDURE — 80048 BASIC METABOLIC PNL TOTAL CA: CPT

## 2023-04-14 PROCEDURE — 99152 MOD SED SAME PHYS/QHP 5/>YRS: CPT

## 2023-04-14 PROCEDURE — C1887: CPT

## 2023-04-14 PROCEDURE — 36415 COLL VENOUS BLD VENIPUNCTURE: CPT

## 2023-04-14 PROCEDURE — C1894: CPT

## 2023-04-14 PROCEDURE — 93005 ELECTROCARDIOGRAM TRACING: CPT

## 2023-04-14 PROCEDURE — 93458 L HRT ARTERY/VENTRICLE ANGIO: CPT | Mod: 26

## 2023-04-14 PROCEDURE — 85025 COMPLETE CBC W/AUTO DIFF WBC: CPT

## 2023-04-14 PROCEDURE — 93010 ELECTROCARDIOGRAM REPORT: CPT

## 2023-04-14 RX ORDER — SODIUM CHLORIDE 9 MG/ML
250 INJECTION INTRAMUSCULAR; INTRAVENOUS; SUBCUTANEOUS ONCE
Refills: 0 | Status: DISCONTINUED | OUTPATIENT
Start: 2023-04-14 | End: 2023-04-29

## 2023-04-14 RX ORDER — ASPIRIN/CALCIUM CARB/MAGNESIUM 324 MG
1 TABLET ORAL
Refills: 0 | DISCHARGE

## 2023-04-14 RX ORDER — POTASSIUM CHLORIDE 20 MEQ
1 PACKET (EA) ORAL
Qty: 0 | Refills: 0 | DISCHARGE

## 2023-04-14 RX ORDER — MONTELUKAST 4 MG/1
1 TABLET, CHEWABLE ORAL
Qty: 0 | Refills: 0 | DISCHARGE

## 2023-04-14 RX ORDER — LEVOTHYROXINE SODIUM 125 MCG
1 TABLET ORAL
Qty: 0 | Refills: 0 | DISCHARGE

## 2023-04-14 RX ORDER — FLUOXETINE HCL 10 MG
1 CAPSULE ORAL
Refills: 0 | DISCHARGE

## 2023-04-14 RX ORDER — CHOLECALCIFEROL (VITAMIN D3) 125 MCG
0 CAPSULE ORAL
Qty: 0 | Refills: 0 | DISCHARGE

## 2023-04-14 RX ORDER — POTASSIUM CHLORIDE 20 MEQ
40 PACKET (EA) ORAL ONCE
Refills: 0 | Status: DISCONTINUED | OUTPATIENT
Start: 2023-04-14 | End: 2023-04-29

## 2023-04-14 RX ORDER — FLUOXETINE HCL 10 MG
1 CAPSULE ORAL
Qty: 0 | Refills: 0 | DISCHARGE

## 2023-04-14 RX ORDER — PITAVASTATIN CALCIUM 1.04 MG/1
1 TABLET, FILM COATED ORAL
Refills: 0 | DISCHARGE

## 2023-04-14 RX ORDER — ROSUVASTATIN CALCIUM 5 MG/1
1 TABLET ORAL
Qty: 0 | Refills: 0 | DISCHARGE

## 2023-04-14 RX ORDER — LOSARTAN/HYDROCHLOROTHIAZIDE 100MG-25MG
1 TABLET ORAL
Refills: 0 | DISCHARGE

## 2023-04-14 RX ORDER — ASCORBIC ACID 60 MG
0 TABLET,CHEWABLE ORAL
Qty: 0 | Refills: 0 | DISCHARGE

## 2023-04-14 RX ORDER — MAGNESIUM SULFATE 500 MG/ML
2 VIAL (ML) INJECTION ONCE
Refills: 0 | Status: DISCONTINUED | OUTPATIENT
Start: 2023-04-14 | End: 2023-04-29

## 2023-04-14 RX ORDER — LEVOTHYROXINE SODIUM 125 MCG
1 TABLET ORAL
Refills: 0 | DISCHARGE

## 2023-04-14 RX ORDER — GLIMEPIRIDE 1 MG
1 TABLET ORAL
Qty: 0 | Refills: 0 | DISCHARGE

## 2023-04-14 RX ORDER — AMLODIPINE BESYLATE 2.5 MG/1
1 TABLET ORAL
Qty: 0 | Refills: 0 | DISCHARGE

## 2023-04-14 RX ORDER — UMECLIDINIUM BROMIDE AND VILANTEROL TRIFENATATE 62.5; 25 UG/1; UG/1
1 POWDER RESPIRATORY (INHALATION)
Qty: 0 | Refills: 0 | DISCHARGE

## 2023-04-14 NOTE — DISCHARGE NOTE PROVIDER - HOSPITAL COURSE
56y/o female with history of DM, HTN, HLD hypothyroid, AQUILES, anticardiolipin syndrome who has been having occasional episodes of left sided, nonexertional chest discomfort, described as a cramping sensation. She had a nuclear stress test which showed a small-moderate area of mild-moderate, mid apical anterior wall ischemia.

## 2023-04-14 NOTE — DISCHARGE NOTE PROVIDER - NSDCCPCAREPLAN_GEN_ALL_CORE_FT
PRINCIPAL DISCHARGE DIAGNOSIS  Diagnosis: Chest pain with normal angiography  Assessment and Plan of Treatment:   Wrist precautions explained.  Medications: Continue current medications.      SECONDARY DISCHARGE DIAGNOSES  Diagnosis: Pure hypercholesterolemia  Assessment and Plan of Treatment:   Goal Non-HDL < 100, LDL < 70  Lipid Panel: Close to goal  Statin: Livalo 2 mg daily  Other: N/A

## 2023-04-14 NOTE — DISCHARGE NOTE PROVIDER - CARE PROVIDER_API CALL
Rajeev Lagunas)  Internal Medicine  [facility] [place] [address]  Phone: [phone]  Fax: [phone]  Established Patient  Follow Up Time: 2 weeks

## 2023-04-14 NOTE — DISCHARGE NOTE PROVIDER - NSDCFUSCHEDAPPT_GEN_ALL_CORE_FT
Tamara Valencia  [facility]  ENDOCRIN [address]  Scheduled Appointment: 04/24/2023    [facility]  OBGYNGEN [address]  Scheduled Appointment: 05/08/2023    [address]  [facility]  OBGYNGEN [address]  Scheduled Appointment: 05/08/2023    Rajeev Lagunas  [facility]  CARDIOLOGY [address]  Scheduled Appointment: 06/22/2023

## 2023-04-14 NOTE — DISCHARGE NOTE PROVIDER - NSDCCPTREATMENT_GEN_ALL_CORE_FT
PRINCIPAL PROCEDURE  Procedure: Left heart catheterization  Findings and Treatment:   Coronary Angiography   The coronary circulation is right dominant.    LM   Left main artery: Angiography shows no disease. HECTOR Flow 3.    LAD   Left anterior descending artery: Angiography shows no disease. HECTOR Flow 3.  CX   Circumflex: Angiography shows no disease. HECTOR Flow 3.    RCA   Right coronary artery: Angiography shows no disease. HECTOR Flow 3.    Left Heart Cath   Left ventricular function was not assessed.   LV to AO pullback was performed and there is no pressure gradient.   The left ventricular end diastolic pressure was 17 mmHg. normal.

## 2023-04-14 NOTE — ASU PATIENT PROFILE, ADULT - FALL HARM RISK - UNIVERSAL INTERVENTIONS
Bed in lowest position, wheels locked, appropriate side rails in place/Call bell, personal items and telephone in reach/Instruct patient to call for assistance before getting out of bed or chair/Non-slip footwear when patient is out of bed/[place] to call system/Physically safe environment - no spills, clutter or unnecessary equipment/Purposeful Proactive Rounding/Room/bathroom lighting operational, light cord in reach

## 2023-04-14 NOTE — DISCHARGE NOTE PROVIDER - NSDCMRMEDTOKEN_GEN_ALL_CORE_FT
amLODIPine 10 mg oral tablet: 1 tab(s) orally once a day  aspirin 81 mg oral capsule: 1 orally  Calcium 500+D:   FLUoxetine 20 mg oral capsule: 1 orally once a day  Klor-Con 8 oral tablet, extended release: 1 tab(s) orally once a day  Livalo 2 mg oral tablet: 1 orally  losartan-hydroCHLOROthiazide 100 mg-25 mg oral tablet: 1 orally  metFORMIN 1000 mg oral tablet: 1 tab(s) orally 2 times a day RESTART ON APRIL 17, 2023  montelukast 10 mg oral tablet: 1 tab(s) orally once a day  Multiple Vitamins oral tablet: 1 tab(s) orally once a day  Synthroid 50 mcg (0.05 mg) oral tablet: 1 orally once a day  Vitamin C:   Vitamin D3:

## 2023-04-14 NOTE — DISCHARGE NOTE NURSING/CASE MANAGEMENT/SOCIAL WORK - PATIENT PORTAL LINK FT
You can access the FollowMyHealth Patient Portal offered by [facility] by registering at the following website: [email or web address]. By joining [facility]’s FollowMyHealth portal, you will also be able to view your health information using other applications (apps) compatible with our system.

## 2023-04-17 DIAGNOSIS — R07.9 CHEST PAIN, UNSPECIFIED: ICD-10-CM

## 2023-04-17 RX ORDER — METFORMIN HYDROCHLORIDE 850 MG/1
1 TABLET ORAL
Qty: 0 | Refills: 0 | DISCHARGE
Start: 2023-04-17

## 2023-04-21 LAB
HBA1C MFR BLD HPLC: 6.4
LDLC SERPL DIRECT ASSAY-MCNC: 86
MICROALBUMIN/CREAT 24H UR-RTO: 8
TSH SERPL-ACNC: 0.94

## 2023-05-05 PROBLEM — E78.00 PURE HYPERCHOLESTEROLEMIA, UNSPECIFIED: Chronic | Status: ACTIVE | Noted: 2023-04-13

## 2023-05-05 PROBLEM — I10 ESSENTIAL (PRIMARY) HYPERTENSION: Chronic | Status: ACTIVE | Noted: 2023-04-13

## 2023-05-05 PROBLEM — D68.61 ANTIPHOSPHOLIPID SYNDROME: Chronic | Status: ACTIVE | Noted: 2023-04-14

## 2023-05-05 PROBLEM — E11.9 TYPE 2 DIABETES MELLITUS WITHOUT COMPLICATIONS: Chronic | Status: ACTIVE | Noted: 2023-04-13

## 2023-05-05 PROBLEM — I27.20 PULMONARY HYPERTENSION, UNSPECIFIED: Chronic | Status: ACTIVE | Noted: 2023-04-13

## 2023-05-08 ENCOUNTER — APPOINTMENT (OUTPATIENT)
Dept: OBGYN | Facility: CLINIC | Age: 58
End: 2023-05-08
Payer: COMMERCIAL

## 2023-05-08 ENCOUNTER — ASOB RESULT (OUTPATIENT)
Age: 58
End: 2023-05-08

## 2023-05-08 VITALS
SYSTOLIC BLOOD PRESSURE: 124 MMHG | WEIGHT: 223 LBS | HEIGHT: 64 IN | DIASTOLIC BLOOD PRESSURE: 75 MMHG | BODY MASS INDEX: 38.07 KG/M2

## 2023-05-08 PROCEDURE — 76830 TRANSVAGINAL US NON-OB: CPT

## 2023-05-08 PROCEDURE — 76856 US EXAM PELVIC COMPLETE: CPT | Mod: 59

## 2023-05-08 PROCEDURE — 77080 DXA BONE DENSITY AXIAL: CPT

## 2023-05-08 PROCEDURE — 82270 OCCULT BLOOD FECES: CPT

## 2023-05-08 PROCEDURE — 99396 PREV VISIT EST AGE 40-64: CPT

## 2023-05-09 LAB — HPV HIGH+LOW RISK DNA PNL CVX: NOT DETECTED

## 2023-05-11 LAB — CYTOLOGY CVX/VAG DOC THIN PREP: ABNORMAL

## 2023-05-22 ENCOUNTER — RESULT CHARGE (OUTPATIENT)
Age: 58
End: 2023-05-22

## 2023-05-22 ENCOUNTER — APPOINTMENT (OUTPATIENT)
Dept: ENDOCRINOLOGY | Facility: CLINIC | Age: 58
End: 2023-05-22
Payer: COMMERCIAL

## 2023-05-22 VITALS
BODY MASS INDEX: 37.9 KG/M2 | HEIGHT: 64 IN | HEART RATE: 76 BPM | DIASTOLIC BLOOD PRESSURE: 66 MMHG | SYSTOLIC BLOOD PRESSURE: 124 MMHG | WEIGHT: 222 LBS

## 2023-05-22 DIAGNOSIS — E03.8 OTHER SPECIFIED HYPOTHYROIDISM: ICD-10-CM

## 2023-05-22 LAB — GLUCOSE BLDC GLUCOMTR-MCNC: 213

## 2023-05-22 PROCEDURE — 82962 GLUCOSE BLOOD TEST: CPT

## 2023-05-22 PROCEDURE — 99214 OFFICE O/P EST MOD 30 MIN: CPT | Mod: 25

## 2023-05-22 RX ORDER — BECLOMETHASONE DIPROPIONATE HFA 80 UG/1
80 AEROSOL, METERED RESPIRATORY (INHALATION)
Qty: 11 | Refills: 0 | Status: ACTIVE | COMMUNITY
Start: 2023-02-21

## 2023-05-22 RX ORDER — ALPRAZOLAM 0.5 MG/1
0.5 TABLET ORAL
Qty: 30 | Refills: 0 | Status: ACTIVE | COMMUNITY
Start: 2023-02-16

## 2023-05-22 NOTE — HISTORY OF PRESENT ILLNESS
[FreeTextEntry1] : INTERVAL HISTORY: Lost 17 pounds in the past year. Started exercising a few months ago\par Burning on the outside of the foot, left > right. Has not seen podiatry since symptoms started a few months ago.\par \par Type: 2\par Severity: moderate\par Duration: over 20 years\par \par Associated symptoms-\par Last eye exam: May 2023, mild, stable DR per patient\par Last foot exam: 2022 for small tear in Achilles, mild numbness/tingling and burning sensation in feet.  \par Kidney disease: none\par Heart disease: none\par \par Modifying factors-\par Current meds for glycemic control:\par Metformin 1000 mg BID\par Ozempic 1 mg weekly\par \par Diet: usually eats 3 meals daily, trying to eat better. \par Weight: lost 17 pounds in the past year\par Exercise: 15 minutes YouTube exercise video daily\par \par SMBG once daily, fasting in AM. \par Reviewed logs. BG is 130s to 140s. \par Current B, just ate a slice of pizza. Reports BG was 140 this AM.\par ================================\par Hypothyroidism \par Current regimen: LT4 50 mcg daily, takes in AM with all other meds \par \par Thyroid sonogram 2022: RMP solid, hypoechoic, 0.9 cm nodule- stable\par RUP solid, hypoechoic 0.5 cm nodule- increased in size\par RUP solid, isoechoic 0.2 cm nodule- stable\par LMP solid, hypoechoic 0.9 cm nodule- decreased in size\par LMP solid, hypoechoic 0.9 cm nodule with lobulated margins, stable

## 2023-05-22 NOTE — REVIEW OF SYSTEMS
[Recent Weight Loss (___ Lbs)] : recent weight loss: [unfilled] lbs [Pain/Numbness of Digits] : pain/numbness of digits [Fatigue] : no fatigue [Dysphagia] : no dysphagia [Neck Pain] : no neck pain [Dysphonia] : no dysphonia [Chest Pain] : no chest pain [Palpitations] : no palpitations [Shortness Of Breath] : no shortness of breath [Nausea] : no nausea [Abdominal Pain] : no abdominal pain [Vomiting] : no vomiting [Polyuria] : no polyuria [Polydipsia] : no polydipsia

## 2023-05-22 NOTE — ASSESSMENT
[FreeTextEntry1] : 57 year old female with T2DM, hypothyroidism, MNG, hypertension, and hyperlipidemia. Glycemic control has improved.\par \par 1. T2DM- A1c improved to 6.4%.\par -Continue Metformin.\par -Continue Ozempic 1 mg weekly.\par -DIscussed importance of lifestyle modifications- diet, exercise, and weight loss- to improve glycemic control.\par -Continue SMBG, can increase to twice daily.\par -Repeat A1c and RTO for follow-up with NP in 3 months and MD in 6 months.\par -Keep follow-up with opthalmology for diabetic eye exam.\par -See podiatry for burning sensation in B/L feet.  May also need to see neurology.\par \par 2. Hypothyroidism- euthyroid on replacement T4.\par -Continue current dose of LT4.\par -Repeat TFTs in 3 months.\par \par 3. Hyperlipidemia- LDL acceptable.\par -Continue Livalo.\par -Repeat lipids in 3 months.\par \par 4. Hypertension- controlled.\par -Continue ARB.\par \par 5. MNG- bilateral subcentimeter nodules on sonogram from December 2022.\par -Repeat sonogram December 2023.

## 2023-06-22 ENCOUNTER — APPOINTMENT (OUTPATIENT)
Dept: CARDIOLOGY | Facility: CLINIC | Age: 58
End: 2023-06-22

## 2023-06-28 NOTE — ASU PATIENT PROFILE, ADULT - NS SC CAGE ALCOHOL GUILTY ABOUT
Noted below from [place], Echo scheduled 8/7/23 at 2:15p.   Received: 2 days ago  TENZIN Monet R.N.  Can you please help with this ? Week of 8/7. thanks     MyChart to patient   no

## 2023-08-14 ENCOUNTER — APPOINTMENT (OUTPATIENT)
Dept: ENDOCRINOLOGY | Facility: CLINIC | Age: 58
End: 2023-08-14

## 2023-08-18 LAB
HBA1C MFR BLD HPLC: 6.1
LDLC SERPL DIRECT ASSAY-MCNC: 59
MICROALBUMIN/CREAT 24H UR-RTO: 17
TSH SERPL-ACNC: 0.87

## 2023-08-21 ENCOUNTER — APPOINTMENT (OUTPATIENT)
Dept: ENDOCRINOLOGY | Facility: CLINIC | Age: 58
End: 2023-08-21
Payer: COMMERCIAL

## 2023-08-21 DIAGNOSIS — E04.2 NONTOXIC MULTINODULAR GOITER: ICD-10-CM

## 2023-08-21 PROCEDURE — 99214 OFFICE O/P EST MOD 30 MIN: CPT | Mod: 95

## 2023-08-21 RX ORDER — LEVOTHYROXINE SODIUM 0.05 MG/1
50 TABLET ORAL
Qty: 90 | Refills: 1 | Status: ACTIVE | COMMUNITY
Start: 2018-11-14 | End: 1900-01-01

## 2023-08-21 NOTE — HISTORY OF PRESENT ILLNESS
[FreeTextEntry1] : INTERVAL HISTORY: Lost 17 pounds in the past year. Started exercising a few months ago  Burning on the outside of the foot, left > right. Has not seen podiatry since symptoms started a few months ago.  Type: 2 Severity: moderate Duration: over 20 years  Associated symptoms- Last eye exam: May 2023, mild, stable DR per patient Last foot exam: June 2023, started on cream. Ina be seeing neurology. Kidney disease: none Heart disease: none  Modifying factors- Current meds for glycemic control: Metformin 1000 mg BID Ozempic 1 mg weekly  Diet: usually eats 3 meals daily, trying to eat better. Eating a lot of tomatoes. Weight: lost 17 pounds in the past year Exercise: 15 minutes YouTube exercise video daily  SMBG once daily, fasting in AM. Reports BG is 110s to 120s. ================================ Hypothyroidism Current regimen: LT4 50 mcg daily, takes appropriately.  Thyroid sonogram December 2022: RMP solid, hypoechoic, 0.9 cm nodule- stable RUP solid, hypoechoic 0.5 cm nodule- increased in size RUP solid, isoechoic 0.2 cm nodule- stable LMP solid, hypoechoic 0.9 cm nodule- decreased in size LMP solid, hypoechoic 0.9 cm nodule with lobulated margins, stable  Denies anterior neck pain, dysphagia, and voice changes

## 2023-08-21 NOTE — REVIEW OF SYSTEMS
Health Maintenance Summary     Topic Due On Due Status Completed On    MAMMOGRAM - BREAST CANCER SCREENING Aug 28, 2017 Overdue Aug 28, 2015    Osteoporosis Screening  Addressed Jul 25, 2016    Colorectal Cancer Screening - Blood in Stool Test Nov 4, 2017 Overdue Nov 4, 2016    Immunization - Pneumococcal  Completed Nov 1, 2016    Immunization - TDAP Pregnancy  Hidden     Medicare Wellness Visit Dec 16, 2018 Not Due Dec 16, 2017    IMMUNIZATION - DTaP/Tdap/Td Oct 28, 1965 Overdue     Immunization-Influenza  Completed Oct 5, 2017    Depression Screening Nov 1, 2017 Overdue Nov 1, 2016          Patient is due for topics as listed above, she wishes to proceed at this time, order (s) placed and patient given information .             [Fatigue] : no fatigue [Recent Weight Gain (___ Lbs)] : no recent weight gain [Recent Weight Loss (___ Lbs)] : no recent weight loss [Dysphagia] : no dysphagia [Neck Pain] : no neck pain [Dysphonia] : no dysphonia [Chest Pain] : no chest pain [Palpitations] : no palpitations [Shortness Of Breath] : no shortness of breath [Nausea] : no nausea [Constipation] : constipation [Abdominal Pain] : no abdominal pain [Vomiting] : no vomiting [Diarrhea] : no diarrhea [Polyuria] : no polyuria [Tremors] : no tremors [Pain/Numbness of Digits] : pain/numbness of digits [Depression] : depression [Anxiety] : no anxiety [Polydipsia] : no polydipsia

## 2023-08-21 NOTE — REASON FOR VISIT
[Follow - Up] : a follow-up visit [DM Type 2] : DM Type 2 [Hypothyroidism] : hypothyroidism [Home] : at home, [unfilled] , at the time of the visit. [Medical Office: ([facility])___] : at the medical office located in  [Patient] : the patient [Self] : self

## 2023-08-21 NOTE — ASSESSMENT
[FreeTextEntry1] : 57 year old female with T2DM, hypothyroidism, MNG, hypertension, and hyperlipidemia. Glycemic control has improved.  1. T2DM- A1c improved to 6.1%. -Continue Metformin. -Continue Ozempic 1 mg weekly. -Discussed importance of lifestyle modifications- diet, exercise, and weight loss- to improve glycemic control. -Continue SMBG but rotate times. -Repeat A1c and RTO for follow-up with MD in 3 months. -Burning sensation in B/L feet improved with cream prescribed by podiatrist. Still planning on seeing neurology.  2. Hypothyroidism- euthyroid on replacement T4. -Continue current dose of LT4. -Repeat TFTs in 3 months.  3. Hyperlipidemia- LDL acceptable. -Continue Livalo. -Repeat lipids in 3 months.  4. Hypertension- controlled. -Continue ARB.  5. MNG- bilateral subcentimeter nodules on sonogram from December 2022. -Repeat sonogram December 2023.  6. Hypokalemia- Potassium 3.1 on KCl 8 mEq daily. -Can follow-up with PCP. -Reviewed potassium rich foods (spinach, beans, lentils). -Will d/w Dr Bronson.

## 2023-09-12 ENCOUNTER — RX RENEWAL (OUTPATIENT)
Age: 58
End: 2023-09-12

## 2023-09-19 ENCOUNTER — NON-APPOINTMENT (OUTPATIENT)
Age: 58
End: 2023-09-19

## 2023-09-19 ENCOUNTER — APPOINTMENT (OUTPATIENT)
Dept: CARDIOLOGY | Facility: CLINIC | Age: 58
End: 2023-09-19
Payer: COMMERCIAL

## 2023-09-19 VITALS
RESPIRATION RATE: 16 BRPM | HEART RATE: 68 BPM | BODY MASS INDEX: 36.88 KG/M2 | DIASTOLIC BLOOD PRESSURE: 72 MMHG | HEIGHT: 64 IN | WEIGHT: 216 LBS | SYSTOLIC BLOOD PRESSURE: 122 MMHG

## 2023-09-19 DIAGNOSIS — I27.20 PULMONARY HYPERTENSION, UNSPECIFIED: ICD-10-CM

## 2023-09-19 DIAGNOSIS — I77.810 THORACIC AORTIC ECTASIA: ICD-10-CM

## 2023-09-19 PROCEDURE — 99214 OFFICE O/P EST MOD 30 MIN: CPT | Mod: 25

## 2023-09-19 PROCEDURE — 93000 ELECTROCARDIOGRAM COMPLETE: CPT

## 2023-09-19 RX ORDER — AMLODIPINE BESYLATE 10 MG/1
10 TABLET ORAL DAILY
Qty: 90 | Refills: 1 | Status: ACTIVE | COMMUNITY
Start: 1900-01-01 | End: 1900-01-01

## 2023-09-19 RX ORDER — POTASSIUM CHLORIDE 1500 MG/1
20 TABLET, FILM COATED, EXTENDED RELEASE ORAL
Refills: 0 | Status: ACTIVE | COMMUNITY

## 2023-09-19 RX ORDER — LOSARTAN POTASSIUM AND HYDROCHLOROTHIAZIDE 25; 100 MG/1; MG/1
100-25 TABLET ORAL DAILY
Qty: 90 | Refills: 1 | Status: ACTIVE | COMMUNITY
Start: 1900-01-01 | End: 1900-01-01

## 2023-09-19 RX ORDER — POTASSIUM CHLORIDE 600 MG/1
8 TABLET, FILM COATED, EXTENDED RELEASE ORAL DAILY
Qty: 180 | Refills: 1 | Status: DISCONTINUED | COMMUNITY
Start: 2019-11-27 | End: 2023-09-19

## 2023-09-19 RX ORDER — PITAVASTATIN CALCIUM 2.09 MG/1
2 TABLET, FILM COATED ORAL
Qty: 90 | Refills: 1 | Status: ACTIVE | COMMUNITY
Start: 1900-01-01 | End: 1900-01-01

## 2024-01-11 RX ORDER — SEMAGLUTIDE 1.34 MG/ML
4 INJECTION, SOLUTION SUBCUTANEOUS
Qty: 3 | Refills: 1 | Status: ACTIVE | COMMUNITY
Start: 2022-08-22

## 2024-02-20 ENCOUNTER — APPOINTMENT (OUTPATIENT)
Dept: ENDOCRINOLOGY | Facility: CLINIC | Age: 59
End: 2024-02-20
Payer: COMMERCIAL

## 2024-02-20 VITALS
SYSTOLIC BLOOD PRESSURE: 130 MMHG | HEIGHT: 64 IN | HEART RATE: 66 BPM | OXYGEN SATURATION: 97 % | DIASTOLIC BLOOD PRESSURE: 86 MMHG | WEIGHT: 228 LBS | BODY MASS INDEX: 38.93 KG/M2

## 2024-02-20 PROCEDURE — 99214 OFFICE O/P EST MOD 30 MIN: CPT

## 2024-02-20 PROCEDURE — 82962 GLUCOSE BLOOD TEST: CPT

## 2024-02-20 RX ORDER — FLUOXETINE HYDROCHLORIDE 20 MG/1
20 CAPSULE ORAL
Refills: 0 | Status: ACTIVE | COMMUNITY

## 2024-02-22 RX ORDER — POTASSIUM CHLORIDE 1500 MG/1
20 TABLET, FILM COATED, EXTENDED RELEASE ORAL
Qty: 90 | Refills: 1 | Status: ACTIVE | COMMUNITY
Start: 2024-02-22 | End: 1900-01-01

## 2024-02-23 NOTE — REVIEW OF SYSTEMS
[Fatigue] : no fatigue [Recent Weight Gain (___ Lbs)] : no recent weight gain [Recent Weight Loss (___ Lbs)] : no recent weight loss [Dysphagia] : no dysphagia [Neck Pain] : no neck pain [Dysphonia] : no dysphonia [Chest Pain] : no chest pain [Palpitations] : no palpitations [Shortness Of Breath] : no shortness of breath [Nausea] : no nausea [Constipation] : constipation [Abdominal Pain] : no abdominal pain [Vomiting] : no vomiting [Diarrhea] : no diarrhea [Polyuria] : no polyuria [Tremors] : no tremors [Pain/Numbness of Digits] : pain/numbness of digits [Depression] : depression [Anxiety] : no anxiety [Polydipsia] : no polydipsia

## 2024-02-23 NOTE — HISTORY OF PRESENT ILLNESS
[FreeTextEntry1] : INTERVAL HISTORY: Lost 17 pounds in the past year. Started exercising a few months ago  Burning on the outside of the foot, left > right. Has not seen podiatry since symptoms started a few months ago.  Type: 2 Severity: moderate Duration: over 20 years  Associated symptoms- Last eye exam: May 2023, mild, stable DR per patient Last foot exam: June 2023, started on cream. Ian be seeing neurology. Kidney disease: none Heart disease: none  Modifying factors- Current meds for glycemic control: Metformin 1000 mg BID Ozempic 1 mg weekly  Diet: usually eats 3 meals daily, trying to eat better. Eating a lot of tomatoes. Weight: lost 17 pounds in the past year Exercise: 15 minutes YouTube exercise video daily  SMBG once daily, fasting in AM. Reports BG is 110s to 120s. ================================ Hypothyroidism Current regimen: LT4 50 mcg daily, takes appropriately.  Thyroid sonogram December 2022: RMP solid, hypoechoic, 0.9 cm nodule- stable RUP solid, hypoechoic 0.5 cm nodule- increased in size RUP solid, isoechoic 0.2 cm nodule- stable LMP solid, hypoechoic 0.9 cm nodule- decreased in size LMP solid, hypoechoic 0.9 cm nodule with lobulated margins, stable  Denies anterior neck pain, dysphagia, and voice changes

## 2024-02-23 NOTE — REASON FOR VISIT
[Follow - Up] : a follow-up visit [DM Type 2] : DM Type 2 [Hypothyroidism] : hypothyroidism [Home] : at home, [unfilled] , at the time of the visit. [Medical Office: ([facility])___] : at the medical office located in  [Patient] : the patient [Self] : self

## 2024-03-01 LAB — GLUCOSE BLDC GLUCOMTR-MCNC: 123

## 2024-03-04 ENCOUNTER — APPOINTMENT (OUTPATIENT)
Dept: CARDIOLOGY | Facility: CLINIC | Age: 59
End: 2024-03-04
Payer: COMMERCIAL

## 2024-03-04 PROCEDURE — 93306 TTE W/DOPPLER COMPLETE: CPT

## 2024-03-11 ENCOUNTER — APPOINTMENT (OUTPATIENT)
Dept: CARDIOLOGY | Facility: CLINIC | Age: 59
End: 2024-03-11

## 2024-03-11 RX ORDER — METFORMIN HYDROCHLORIDE 1000 MG/1
1000 TABLET, COATED ORAL
Qty: 180 | Refills: 1 | Status: ACTIVE | COMMUNITY
Start: 2019-01-04 | End: 1900-01-01

## 2024-03-18 ENCOUNTER — APPOINTMENT (OUTPATIENT)
Dept: CARDIOLOGY | Facility: CLINIC | Age: 59
End: 2024-03-18
Payer: COMMERCIAL

## 2024-03-18 ENCOUNTER — NON-APPOINTMENT (OUTPATIENT)
Age: 59
End: 2024-03-18

## 2024-03-18 VITALS
BODY MASS INDEX: 38.93 KG/M2 | SYSTOLIC BLOOD PRESSURE: 134 MMHG | HEIGHT: 64 IN | DIASTOLIC BLOOD PRESSURE: 82 MMHG | RESPIRATION RATE: 16 BRPM | HEART RATE: 81 BPM | WEIGHT: 228 LBS

## 2024-03-18 DIAGNOSIS — R06.02 SHORTNESS OF BREATH: ICD-10-CM

## 2024-03-18 DIAGNOSIS — E78.5 HYPERLIPIDEMIA, UNSPECIFIED: ICD-10-CM

## 2024-03-18 DIAGNOSIS — E03.9 HYPOTHYROIDISM, UNSPECIFIED: ICD-10-CM

## 2024-03-18 DIAGNOSIS — R94.31 ABNORMAL ELECTROCARDIOGRAM [ECG] [EKG]: ICD-10-CM

## 2024-03-18 DIAGNOSIS — R01.1 CARDIAC MURMUR, UNSPECIFIED: ICD-10-CM

## 2024-03-18 DIAGNOSIS — E66.01 MORBID (SEVERE) OBESITY DUE TO EXCESS CALORIES: ICD-10-CM

## 2024-03-18 DIAGNOSIS — I51.7 CARDIOMEGALY: ICD-10-CM

## 2024-03-18 DIAGNOSIS — E87.6 HYPOKALEMIA: ICD-10-CM

## 2024-03-18 DIAGNOSIS — R53.82 CHRONIC FATIGUE, UNSPECIFIED: ICD-10-CM

## 2024-03-18 DIAGNOSIS — I10 ESSENTIAL (PRIMARY) HYPERTENSION: ICD-10-CM

## 2024-03-18 DIAGNOSIS — E11.65 TYPE 2 DIABETES MELLITUS WITH HYPERGLYCEMIA: ICD-10-CM

## 2024-03-18 DIAGNOSIS — G47.33 OBSTRUCTIVE SLEEP APNEA (ADULT) (PEDIATRIC): ICD-10-CM

## 2024-03-18 PROCEDURE — 99214 OFFICE O/P EST MOD 30 MIN: CPT

## 2024-03-18 PROCEDURE — 93000 ELECTROCARDIOGRAM COMPLETE: CPT

## 2024-03-18 NOTE — REASON FOR VISIT
[FreeTextEntry1] : Erik CRISOSTOMO is being seen for a clinic follow-up of.   The patient is a 58-year-old white female with a known history for long-standing history of morbid obesity and prior bariatric surgery the lowest initially a moderate amount of weight and then somewhat plateaued. She presents in followup the office for general cardiac checkup. She has been treated for hypertension and hyperlipidemia ;   She does have history for AQUILES (not on CPAP) and history for asthma which she does not normally use her inhaler;  Patient has been getting occasional twinges of chest discomfort on and off -and occasional fleeting palpitations in the chest; -but reports this is most likely related to her anxiety / stress and she denies any significant SOB, diaphoresis, COY, PND, orthopnea.  However, she's also been noticing increased fatigue on a day to day basis as well.

## 2024-03-18 NOTE — HISTORY OF PRESENT ILLNESS
[FreeTextEntry1] : She had been seeing a pulmonologist in [place] periodically for underlying asthma but despite having "mild obstructive sleep apnea" she still is not willing to try the CPAP; she has not however looked into an "oral appliance".  Advised patient to seriously consider either using CPAP -or- oral appliance to treat her AQUILES considering her most recent echocardiogram now shows moderate left atrium dilation (could develop atrial fibrillation in near future if left untreated).  Also, untreated AQUILES could be the cause for her daytime fatigue.   Recent labs by her Endocrinologist found slightly low Potassium at 3.3.  Their office apparently increased her Potassium supplement to 20 mEQ and will follow up next week with repeat labs.    Transthoracic Echocardiogram (3/4/2024):  Mild to moderate dilated left atrium, mild concentric LVH and mild dilation of ascending aorta (3.6 cm).   Global LV systolic function is normal with LVEF of 60 to 65%. Trace MR and TR. Trivial to trace pericardial effusion noted (chronic).     She also completed a nuclear stress test earlier this year in March of which demonstrated some possible new ischemia but was equivocal with possible breast attenuation artifact.  This prompted her to subsequently undergo a Left Heart Catheterization in April of which thankfully found normal coronaries throughout.    Carotid Doppler (4/11/2022):  There was no evidence of atherosclerotic plaquing bilaterally. There was an incidental finding for left thyroid cysts;

## 2024-03-18 NOTE — PHYSICAL EXAM
[Well Developed] : well developed [No Acute Distress] : no acute distress [Normal Conjunctiva] : normal conjunctiva [Obese] : obese [Normal Venous Pressure] : normal venous pressure [No Carotid Bruit] : no carotid bruit [No Rub] : no rub [Normal S1, S2] : normal S1, S2 [No Gallop] : no gallop [Good Air Entry] : good air entry [Clear Lung Fields] : clear lung fields [Murmur] : murmur [No Respiratory Distress] : no respiratory distress  [Non Tender] : non-tender [Soft] : abdomen soft [No Masses/organomegaly] : no masses/organomegaly [No Edema] : no edema [Normal Gait] : normal gait [No Cyanosis] : no cyanosis [No Clubbing] : no clubbing [No Rash] : no rash [No Skin Lesions] : no skin lesions [Moves all extremities] : moves all extremities [No Focal Deficits] : no focal deficits [Alert and Oriented] : alert and oriented [Normal Speech] : normal speech [Normal memory] : normal memory [de-identified] : Grade II/VI systolic murmur

## 2024-03-18 NOTE — ASSESSMENT
[FreeTextEntry1] : EKG:  The EKG illustrates sinus rhythm, rate of 81 bpm, left atrial enlargement pattern, left anterior fascicular block, poor R wave progression across precordial leads. Essentially unchanged   In summary, this 58-year-old white female has a history for obesity, hypertension which has been under reasonable control, AQUILES, (no CPAP in use), who has had some occasional fleeting palpitations in the chest but no change from her baseline and carries some significant cardiac risk factors.  She has also noted an increase in daytime fatigue.    She has been seeing a pulmonologist in [facility] for underlying asthma but despite having "mild obstructive sleep apnea" she still is not willing to try the CPAP; she has not however looked into an "oral appliance".  Advised patient to seriously consider either using CPAP -or- oral appliance to treat her AQUILES considering her most recent echocardiogram now shows moderate left atrium dilation (could develop atrial fibrillation in near future if left untreated).  Also, untreated AQUILES could be the cause for her daytime fatigue.   She also completed a nuclear stress test earlier last year in March 2023 of which demonstrated some possible new ischemia but was equivocal with possible breast attenuation artifact.  This prompted her to subsequently undergo a Left Heart Catheterization in April 2023 of which thankfully found normal coronaries throughout    PLAN:  1).  Once again, I advised patient to seriously consider follow up with Pulmonology to discuss treatment options for AQUILES.  If left untreated, her left atrium could continue to enlarge and cause her to develop atrial fibrillation that in turn could cause CVA. Also, attempt to wear (Breathe Right nasal strips) in hopes for some improvement in sleeping and decrease daytime fatigue.    Diet and lifestyle modification discussed including low sodium, low fat and low carbohydrate weight reducing diet.  Patient is to implement aerobic exercise regimen few days per week.   2).  Return to office with Dr Lagunas within 5 to 6 months or as needed.  3).  Recommend patient report any untoward symptoms such as worsening palpitations, lightheadedness, shortness of breath, fatigue, chest pain.   No additional cardiac testing indicated at this time.

## 2024-03-19 DIAGNOSIS — Z12.31 ENCOUNTER FOR SCREENING MAMMOGRAM FOR MALIGNANT NEOPLASM OF BREAST: ICD-10-CM

## 2024-05-06 ENCOUNTER — APPOINTMENT (OUTPATIENT)
Dept: OBGYN | Facility: CLINIC | Age: 59
End: 2024-05-06
Payer: COMMERCIAL

## 2024-05-06 ENCOUNTER — ASOB RESULT (OUTPATIENT)
Age: 59
End: 2024-05-06

## 2024-05-06 VITALS
WEIGHT: 225 LBS | BODY MASS INDEX: 38.41 KG/M2 | DIASTOLIC BLOOD PRESSURE: 62 MMHG | SYSTOLIC BLOOD PRESSURE: 123 MMHG | HEIGHT: 64 IN

## 2024-05-06 DIAGNOSIS — Z01.419 ENCOUNTER FOR GYNECOLOGICAL EXAMINATION (GENERAL) (ROUTINE) W/OUT ABNORMAL FINDINGS: ICD-10-CM

## 2024-05-06 DIAGNOSIS — D21.9 BENIGN NEOPLASM OF CONNECTIVE AND OTHER SOFT TISSUE, UNSPECIFIED: ICD-10-CM

## 2024-05-06 PROCEDURE — 99396 PREV VISIT EST AGE 40-64: CPT

## 2024-05-06 PROCEDURE — 76830 TRANSVAGINAL US NON-OB: CPT

## 2024-05-06 PROCEDURE — 82270 OCCULT BLOOD FECES: CPT

## 2024-05-06 NOTE — HISTORY OF PRESENT ILLNESS
[FreeTextEntry1] : 2024. MICHAEL CRISOSTOMO 58 year old female  LMP PM presents for annual visit.  She feels well and offers no complaints. She denies abn discharge or vaginitis sxs. No urinary complaints. She has normal BM, no bloody stool. She denies abdominal or pelvic pain.  GynHx: Fibroids  PMH: HTN, DM II, Hypothyroid, Colitis, Asthma. HLD, fatty liver  SHx: Gastric sleeve  FHx: Sibling: Cirrhosis & Liver ca. Sister: passed from lung ca. Denies FHx of breast, ovarian, uterine or colon cancer. Meds: Avelox, Augmentin, amoxicillin Meds: Ozempic, Synthroid, Qvar Inhaler, Metformin, Kloi Con, Montelukast, Losartan, Amlodipine, Fluoxetine, Vitamin D, Livalo, Baby ASA, Multi Vitamin, Calcium Soc: Social alcohol use     [TextBox_4] : TVUS reviewed today: small fibroids noticed, uterine lining: nml, ovaries: nml  [Mammogramdate] : 5/2023 [TextBox_19] : BIRADS 1  [BreastSonogramDate] : 5/2023 [PapSmeardate] : 5/2023 [TextBox_31] : nml [BoneDensityDate] : 5/2023 [TextBox_37] : nml [ColonoscopyDate] : 2-3yrs ago

## 2024-05-06 NOTE — PLAN
[FreeTextEntry1] : 58 year old female pt presents for routine gyn exam: Breast and pelvic exam performed Pap/HPV conducted Mammogram and breast sonogram UTD  Bone Density UTD  Colonoscopy UTD  TVUS done today   RTO in 1 year for annual or PRN

## 2024-05-07 LAB — HPV HIGH+LOW RISK DNA PNL CVX: NOT DETECTED

## 2024-05-20 ENCOUNTER — APPOINTMENT (OUTPATIENT)
Dept: ENDOCRINOLOGY | Facility: CLINIC | Age: 59
End: 2024-05-20

## 2024-06-04 RX ORDER — CLOTRIMAZOLE AND BETAMETHASONE DIPROPIONATE 10; .5 MG/G; MG/G
1-0.05 CREAM TOPICAL
Qty: 1 | Refills: 0 | Status: ACTIVE | COMMUNITY
Start: 2021-04-15 | End: 1900-01-01

## 2024-06-26 RX ORDER — BLOOD-GLUCOSE METER
W/DEVICE EACH MISCELLANEOUS
Qty: 1 | Refills: 0 | Status: ACTIVE | COMMUNITY
Start: 2019-03-21 | End: 1900-01-01

## 2024-06-26 RX ORDER — BLOOD SUGAR DIAGNOSTIC
STRIP MISCELLANEOUS
Qty: 3 | Refills: 1 | Status: ACTIVE | COMMUNITY
Start: 2019-03-21 | End: 1900-01-01

## 2024-06-26 RX ORDER — LANCETS 33 GAUGE
EACH MISCELLANEOUS
Qty: 300 | Refills: 1 | Status: ACTIVE | COMMUNITY
Start: 2024-06-20 | End: 1900-01-01

## 2024-07-29 ENCOUNTER — RX RENEWAL (OUTPATIENT)
Age: 59
End: 2024-07-29

## 2024-07-29 RX ORDER — POTASSIUM CHLORIDE 1500 MG/1
20 TABLET, EXTENDED RELEASE ORAL
Qty: 90 | Refills: 1 | Status: ACTIVE | COMMUNITY
Start: 2024-07-29 | End: 1900-01-01

## 2024-08-16 LAB
HBA1C MFR BLD HPLC: 6.7
LDLC SERPL DIRECT ASSAY-MCNC: 86
MICROALBUMIN/CREAT 24H UR-RTO: 6
TSH SERPL-ACNC: 0.97

## 2024-08-19 ENCOUNTER — APPOINTMENT (OUTPATIENT)
Dept: ENDOCRINOLOGY | Facility: CLINIC | Age: 59
End: 2024-08-19
Payer: COMMERCIAL

## 2024-08-19 VITALS
OXYGEN SATURATION: 99 % | HEIGHT: 64 IN | BODY MASS INDEX: 38.76 KG/M2 | SYSTOLIC BLOOD PRESSURE: 130 MMHG | WEIGHT: 227 LBS | DIASTOLIC BLOOD PRESSURE: 70 MMHG | HEART RATE: 71 BPM

## 2024-08-19 DIAGNOSIS — E66.01 MORBID (SEVERE) OBESITY DUE TO EXCESS CALORIES: ICD-10-CM

## 2024-08-19 DIAGNOSIS — E11.65 TYPE 2 DIABETES MELLITUS WITH HYPERGLYCEMIA: ICD-10-CM

## 2024-08-19 DIAGNOSIS — E03.9 HYPOTHYROIDISM, UNSPECIFIED: ICD-10-CM

## 2024-08-19 DIAGNOSIS — E78.5 HYPERLIPIDEMIA, UNSPECIFIED: ICD-10-CM

## 2024-08-19 DIAGNOSIS — I10 ESSENTIAL (PRIMARY) HYPERTENSION: ICD-10-CM

## 2024-08-19 LAB — GLUCOSE BLDC GLUCOMTR-MCNC: 131

## 2024-08-19 PROCEDURE — 99215 OFFICE O/P EST HI 40 MIN: CPT

## 2024-08-19 PROCEDURE — 82962 GLUCOSE BLOOD TEST: CPT

## 2024-08-19 NOTE — PHYSICAL EXAM
[Alert] : alert [Well Nourished] : well nourished [No Acute Distress] : no acute distress [Normal Sclera/Conjunctiva] : normal sclera/conjunctiva [No Neck Mass] : no neck mass was observed [Thyroid Not Enlarged] : the thyroid was not enlarged [No Respiratory Distress] : no respiratory distress [No Accessory Muscle Use] : no accessory muscle use [Clear to Auscultation] : lungs were clear to auscultation bilaterally [Normal S1, S2] : normal S1 and S2 [Normal Rate] : heart rate was normal [Regular Rhythm] : with a regular rhythm [Normal Bowel Sounds] : normal bowel sounds [Not Tender] : non-tender [Soft] : abdomen soft [No Stigmata of Cushings Syndrome] : no stigmata of Cushings Syndrome [Normal] : normal [No Tremors] : no tremors [Oriented x3] : oriented to person, place, and time [Normal Affect] : the affect was normal [Normal Insight/Judgement] : insight and judgment were intact [Diminished Throughout Both Feet] : normal tactile sensation with monofilament testing throughout both feet

## 2024-08-19 NOTE — REVIEW OF SYSTEMS
[Constipation] : constipation [Pain/Numbness of Digits] : pain/numbness of digits [Depression] : depression [Fatigue] : no fatigue [Recent Weight Gain (___ Lbs)] : no recent weight gain [Recent Weight Loss (___ Lbs)] : no recent weight loss [Dysphagia] : no dysphagia [Neck Pain] : no neck pain [Dysphonia] : no dysphonia [Chest Pain] : no chest pain [Palpitations] : no palpitations [Shortness Of Breath] : no shortness of breath [Nausea] : no nausea [Abdominal Pain] : no abdominal pain [Vomiting] : no vomiting [Diarrhea] : no diarrhea [Polyuria] : no polyuria [Joint Pain] : no joint pain [Myalgia] : no myalgia  [Acanthosis] : no acanthosis  [Tremors] : no tremors [Anxiety] : no anxiety [Polydipsia] : no polydipsia [Cold Intolerance] : no cold intolerance [Heat Intolerance] : no heat intolerance

## 2024-08-19 NOTE — ASSESSMENT
[FreeTextEntry1] : 57 year old female with T2DM, hypothyroidism, MNG, hypertension, and hyperlipidemia. Glycemic control has improved. Fingerstick in office: 131  A1c:  6.7% in 8/24.  Modifying factors- Current meds for glycemic control: Metformin 1000 mg BID Ozempic 1 mg weekly    Medication changes: continue same meds for now  To check sugars once  a day  at different times. Diet and exercise reviewed. Hypoglycemia reviewed.  Eyes: no  active issues,  up to date. Feet: no active issues, no neuropathy.  Diabetic foot care reviewed.  Lipids:  on statin/ anti-lipid treatment. Last LDL:  86  Renal/ B/P : B/P wnl , on ACE/ ARB.  no proteinuria.   Weight:   obese.   Balanced diet and exercise reviewed. On ozempic.  Hypothyroidism : recent TSH 0.970, continue same dose. Discussed with patient how to take thyroid medication appropriately,empty stomach , all Multi vitamins  4 to 6 hrs away form thyroid medication, he voiced understanding.   Diabetes counselling:  The patient was counseled on diabetes foot care, long term vascular complications of diabetes, carbohydrate consistent diet, importance of diet and exercise to improve glycemic control, achieve weight loss and improve cardiovascular health and action and use of short and long-acting insulin. Patient was referred to ophthalmology for retinopathy screening. ADA diet (30-50 gm carbohydrates with each meal, 15 grams with snacks. Balance with lean proteins and low fat diet.) Exercise daily per ability, work up to 30 minutes a day. Medication, risks and benefits reviewed. Glucose testing and insulin administration for glycemic management.   FOLLOWUP@ 6 months

## 2024-09-18 ENCOUNTER — APPOINTMENT (OUTPATIENT)
Dept: CARDIOLOGY | Facility: CLINIC | Age: 59
End: 2024-09-18
Payer: COMMERCIAL

## 2024-09-18 ENCOUNTER — NON-APPOINTMENT (OUTPATIENT)
Age: 59
End: 2024-09-18

## 2024-09-18 VITALS
WEIGHT: 230 LBS | RESPIRATION RATE: 16 BRPM | BODY MASS INDEX: 39.48 KG/M2 | DIASTOLIC BLOOD PRESSURE: 72 MMHG | OXYGEN SATURATION: 98 % | HEART RATE: 68 BPM | SYSTOLIC BLOOD PRESSURE: 128 MMHG

## 2024-09-18 DIAGNOSIS — E03.9 HYPOTHYROIDISM, UNSPECIFIED: ICD-10-CM

## 2024-09-18 DIAGNOSIS — E66.01 MORBID (SEVERE) OBESITY DUE TO EXCESS CALORIES: ICD-10-CM

## 2024-09-18 DIAGNOSIS — R09.89 OTHER SPECIFIED SYMPTOMS AND SIGNS INVOLVING THE CIRCULATORY AND RESPIRATORY SYSTEMS: ICD-10-CM

## 2024-09-18 DIAGNOSIS — R53.82 CHRONIC FATIGUE, UNSPECIFIED: ICD-10-CM

## 2024-09-18 DIAGNOSIS — I10 ESSENTIAL (PRIMARY) HYPERTENSION: ICD-10-CM

## 2024-09-18 DIAGNOSIS — G47.33 OBSTRUCTIVE SLEEP APNEA (ADULT) (PEDIATRIC): ICD-10-CM

## 2024-09-18 DIAGNOSIS — R94.31 ABNORMAL ELECTROCARDIOGRAM [ECG] [EKG]: ICD-10-CM

## 2024-09-18 DIAGNOSIS — I51.7 CARDIOMEGALY: ICD-10-CM

## 2024-09-18 DIAGNOSIS — I77.810 THORACIC AORTIC ECTASIA: ICD-10-CM

## 2024-09-18 DIAGNOSIS — E78.5 HYPERLIPIDEMIA, UNSPECIFIED: ICD-10-CM

## 2024-09-18 PROCEDURE — 99214 OFFICE O/P EST MOD 30 MIN: CPT

## 2024-09-18 PROCEDURE — 93000 ELECTROCARDIOGRAM COMPLETE: CPT

## 2024-09-18 NOTE — HISTORY OF PRESENT ILLNESS
[FreeTextEntry1] : She had been seeing a pulmonologist in [facility] for underlying asthma but despite having "mild obstructive sleep apnea" she still is not willing to try the CPAP; she has not however looked into an "oral appliance".  Advised patient to seriously consider either using CPAP -or- oral appliance to treat her AQUILES considering her most recent echocardiogram now shows moderate left atrium dilation (could develop atrial fibrillation in near future if left untreated).  Also, untreated AQUILES could be the cause for her daytime fatigue.   Patient reports being under more stress/anxiety lately with the death of her younger sister after battling cancer.    Transthoracic Echocardiogram (3/4/2024):  Mild to moderate dilated left atrium, mild concentric LVH and mild dilation of ascending aorta (3.6 cm).   Global LV systolic function is normal with LVEF of 60 to 65%. Trace MR and TR. Trivial to trace pericardial effusion noted (chronic).     She also completed a nuclear stress test back in March 2023 of which demonstrated some possible new ischemia but was equivocal with possible breast attenuation artifact.  This prompted her to subsequently undergo a Left Heart Catheterization in April 2023 of which thankfully found normal coronaries throughout.    Carotid Doppler (4/11/2022):  There was no evidence of atherosclerotic plaquing bilaterally. There was an incidental finding for left thyroid cysts;

## 2024-09-18 NOTE — ASSESSMENT
[FreeTextEntry1] : EKG:  The EKG illustrates sinus rhythm, rate of 68 bpm, left atrial enlargement pattern, left anterior fascicular block, poor R wave progression across precordial leads. Essentially unchanged   In summary, this 59-year-old white female has a history for obesity, hypertension which has been under reasonable control, AQUILES, (no CPAP in use), who has had some occasional fleeting palpitations in the chest but no change from her baseline and carries some significant cardiac risk factors.  She has also noted an increase in daytime fatigue.    She has been seeing a pulmonologist in [facility] for underlying asthma but despite having "mild obstructive sleep apnea" she still is not willing to try the CPAP; she has not however looked into an "oral appliance".  Advised patient to seriously consider either using CPAP -or- oral appliance to treat her AQUILES considering her most recent echocardiogram now shows moderate left atrium dilation (could develop atrial fibrillation in near future if left untreated).  Also, untreated AQUILES could be the cause for her daytime fatigue.   She also completed a nuclear stress test earlier last year in March 2023 of which demonstrated some possible new ischemia but was equivocal with possible breast attenuation artifact.  This prompted her to subsequently undergo a Left Heart Catheterization in April 2023 of which thankfully found normal coronaries throughout    PLAN:  1).  Once again, I advised patient to seriously consider follow up with Pulmonology to discuss treatment options for AQUILES. ?oral appliance ?Inspire implant, etc.  If left untreated, her left atrium could continue to enlarge and cause her to develop atrial fibrillation that in turn could cause CVA. Also, attempt to wear (Breathe Right nasal strips) in hopes for some improvement in sleeping and decrease daytime fatigue.    Diet and lifestyle modification discussed including low sodium, low fat and low carbohydrate weight reducing diet.  Patient is to implement aerobic exercise regimen few days per week.   2).  Return to office with Dr Lagunas within 5 to 6 months or as needed.  3).  Patient is to complete a Transthoracic Echocardiogram and Carotid Doppler study prior to follow up to assess overall cardiac function and valvulopathy as well as to assess extent of carotid plaquing stenosis respectively.   4).  Recommend patient report any untoward symptoms such as worsening palpitations, lightheadedness, shortness of breath, fatigue, chest pain.   5).  Follow up with PCP and Endocrinology regarding routine checkups and fasting blood work including lipid panel.  Forward all testing/lab work to our office.

## 2024-09-18 NOTE — PHYSICAL EXAM
[Well Developed] : well developed [No Acute Distress] : no acute distress [Obese] : obese [Normal Conjunctiva] : normal conjunctiva [Normal Venous Pressure] : normal venous pressure [Normal S1, S2] : normal S1, S2 [No Rub] : no rub [No Gallop] : no gallop [Murmur] : murmur [Clear Lung Fields] : clear lung fields [Good Air Entry] : good air entry [No Respiratory Distress] : no respiratory distress  [Soft] : abdomen soft [Non Tender] : non-tender [No Masses/organomegaly] : no masses/organomegaly [Normal Gait] : normal gait [No Edema] : no edema [No Cyanosis] : no cyanosis [No Clubbing] : no clubbing [No Rash] : no rash [No Skin Lesions] : no skin lesions [Moves all extremities] : moves all extremities [No Focal Deficits] : no focal deficits [Normal Speech] : normal speech [Alert and Oriented] : alert and oriented [Normal memory] : normal memory [Carotid Bruit] : carotid bruit [de-identified] : Right [de-identified] : Grade II/VI systolic murmur

## 2024-09-18 NOTE — REASON FOR VISIT
[FreeTextEntry1] : Erik CRISOSTOMO is being seen for a clinic follow-up of.   The patient is a 59-year-old white female with a known history for long-standing history of morbid obesity and prior bariatric surgery the lowest initially a moderate amount of weight and then somewhat plateaued. She presents in followup the office for general cardiac checkup. She has been treated for hypertension and hyperlipidemia ;   She does have history for AQUILES (not on CPAP) and history for asthma which she does not normally use her inhaler;  Patient has been getting occasional twinges of chest discomfort on and off -and occasional fleeting palpitations in the chest; -but reports this is most likely related to her anxiety / stress and she denies any significant SOB, diaphoresis, COY, PND, orthopnea.  However, she's also been noticing increased fatigue on a day to day basis as well.

## 2024-11-05 NOTE — ED ADULT NURSE NOTE - CAS TRG GENERAL NORM CIRC DET
Medical Week 2 Survey      Flowsheet Row Responses   [facility] patient discharged from? [facility]   Does the patient have one of the following disease processes/diagnoses(primary or secondary)? Other   Week 2 attempt successful? No   Unsuccessful attempts Attempt 1   Revoke Readmitted            Laila LLAMAS - Registered Nurse  
Strong peripheral pulses

## 2024-12-25 PROBLEM — F10.90 ALCOHOL USE: Status: ACTIVE | Noted: 2018-01-30

## 2024-12-31 ENCOUNTER — RX RENEWAL (OUTPATIENT)
Age: 59
End: 2024-12-31

## 2025-01-31 LAB
HBA1C MFR BLD HPLC: 7.1
LDLC SERPL DIRECT ASSAY-MCNC: 80
MICROALBUMIN/CREAT 24H UR-RTO: 13
TSH SERPL-ACNC: 1.12

## 2025-02-18 ENCOUNTER — RX RENEWAL (OUTPATIENT)
Age: 60
End: 2025-02-18

## 2025-02-24 ENCOUNTER — APPOINTMENT (OUTPATIENT)
Dept: CARDIOLOGY | Facility: CLINIC | Age: 60
End: 2025-02-24
Payer: COMMERCIAL

## 2025-02-24 PROCEDURE — 93880 EXTRACRANIAL BILAT STUDY: CPT

## 2025-02-24 PROCEDURE — 93306 TTE W/DOPPLER COMPLETE: CPT

## 2025-03-10 ENCOUNTER — APPOINTMENT (OUTPATIENT)
Dept: CARDIOLOGY | Facility: CLINIC | Age: 60
End: 2025-03-10
Payer: COMMERCIAL

## 2025-03-10 ENCOUNTER — NON-APPOINTMENT (OUTPATIENT)
Age: 60
End: 2025-03-10

## 2025-03-10 ENCOUNTER — APPOINTMENT (OUTPATIENT)
Dept: ENDOCRINOLOGY | Facility: CLINIC | Age: 60
End: 2025-03-10

## 2025-03-10 VITALS
HEIGHT: 64 IN | HEART RATE: 69 BPM | SYSTOLIC BLOOD PRESSURE: 112 MMHG | WEIGHT: 221 LBS | BODY MASS INDEX: 37.73 KG/M2 | DIASTOLIC BLOOD PRESSURE: 72 MMHG | RESPIRATION RATE: 16 BRPM

## 2025-03-10 DIAGNOSIS — I10 ESSENTIAL (PRIMARY) HYPERTENSION: ICD-10-CM

## 2025-03-10 DIAGNOSIS — I65.29 OCCLUSION AND STENOSIS OF UNSPECIFIED CAROTID ARTERY: ICD-10-CM

## 2025-03-10 DIAGNOSIS — G47.33 OBSTRUCTIVE SLEEP APNEA (ADULT) (PEDIATRIC): ICD-10-CM

## 2025-03-10 DIAGNOSIS — I51.7 CARDIOMEGALY: ICD-10-CM

## 2025-03-10 DIAGNOSIS — R53.82 CHRONIC FATIGUE, UNSPECIFIED: ICD-10-CM

## 2025-03-10 DIAGNOSIS — I77.810 THORACIC AORTIC ECTASIA: ICD-10-CM

## 2025-03-10 DIAGNOSIS — R94.31 ABNORMAL ELECTROCARDIOGRAM [ECG] [EKG]: ICD-10-CM

## 2025-03-10 PROCEDURE — 99214 OFFICE O/P EST MOD 30 MIN: CPT

## 2025-03-10 PROCEDURE — 93000 ELECTROCARDIOGRAM COMPLETE: CPT

## 2025-03-17 ENCOUNTER — APPOINTMENT (OUTPATIENT)
Dept: ENDOCRINOLOGY | Facility: CLINIC | Age: 60
End: 2025-03-17
Payer: COMMERCIAL

## 2025-03-17 DIAGNOSIS — E03.9 HYPOTHYROIDISM, UNSPECIFIED: ICD-10-CM

## 2025-03-17 DIAGNOSIS — E78.5 HYPERLIPIDEMIA, UNSPECIFIED: ICD-10-CM

## 2025-03-17 DIAGNOSIS — E04.2 NONTOXIC MULTINODULAR GOITER: ICD-10-CM

## 2025-03-17 DIAGNOSIS — E11.65 TYPE 2 DIABETES MELLITUS WITH HYPERGLYCEMIA: ICD-10-CM

## 2025-03-17 PROCEDURE — 99214 OFFICE O/P EST MOD 30 MIN: CPT | Mod: 95

## 2025-04-04 ENCOUNTER — NON-APPOINTMENT (OUTPATIENT)
Age: 60
End: 2025-04-04

## 2025-04-04 ENCOUNTER — RX RENEWAL (OUTPATIENT)
Age: 60
End: 2025-04-04

## 2025-04-07 ENCOUNTER — RX RENEWAL (OUTPATIENT)
Age: 60
End: 2025-04-07

## 2025-05-02 ENCOUNTER — NON-APPOINTMENT (OUTPATIENT)
Age: 60
End: 2025-05-02

## 2025-05-05 ENCOUNTER — APPOINTMENT (OUTPATIENT)
Dept: OBGYN | Facility: CLINIC | Age: 60
End: 2025-05-05
Payer: COMMERCIAL

## 2025-05-05 VITALS — DIASTOLIC BLOOD PRESSURE: 69 MMHG | SYSTOLIC BLOOD PRESSURE: 135 MMHG

## 2025-05-05 DIAGNOSIS — N63.0 UNSPECIFIED LUMP IN UNSPECIFIED BREAST: ICD-10-CM

## 2025-05-05 PROCEDURE — 99213 OFFICE O/P EST LOW 20 MIN: CPT

## 2025-06-02 ENCOUNTER — APPOINTMENT (OUTPATIENT)
Dept: PULMONOLOGY | Facility: CLINIC | Age: 60
End: 2025-06-02

## 2025-06-07 ENCOUNTER — NON-APPOINTMENT (OUTPATIENT)
Age: 60
End: 2025-06-07

## 2025-06-09 ENCOUNTER — APPOINTMENT (OUTPATIENT)
Dept: GASTROENTEROLOGY | Facility: CLINIC | Age: 60
End: 2025-06-09
Payer: COMMERCIAL

## 2025-06-09 VITALS
WEIGHT: 220 LBS | DIASTOLIC BLOOD PRESSURE: 98 MMHG | HEART RATE: 73 BPM | HEIGHT: 64 IN | SYSTOLIC BLOOD PRESSURE: 130 MMHG | BODY MASS INDEX: 37.56 KG/M2 | OXYGEN SATURATION: 98 %

## 2025-06-09 PROBLEM — Z90.3 HISTORY OF SLEEVE GASTRECTOMY: Status: ACTIVE | Noted: 2025-06-09

## 2025-06-09 PROCEDURE — 99203 OFFICE O/P NEW LOW 30 MIN: CPT

## 2025-06-09 RX ORDER — OMEPRAZOLE 40 MG/1
40 CAPSULE, DELAYED RELEASE ORAL
Qty: 30 | Refills: 2 | Status: ACTIVE | COMMUNITY
Start: 2025-06-09 | End: 1900-01-01

## 2025-06-16 ENCOUNTER — APPOINTMENT (OUTPATIENT)
Dept: OBGYN | Facility: CLINIC | Age: 60
End: 2025-06-16
Payer: COMMERCIAL

## 2025-06-16 ENCOUNTER — ASOB RESULT (OUTPATIENT)
Age: 60
End: 2025-06-16

## 2025-06-16 VITALS
BODY MASS INDEX: 37.39 KG/M2 | HEIGHT: 64 IN | DIASTOLIC BLOOD PRESSURE: 74 MMHG | SYSTOLIC BLOOD PRESSURE: 118 MMHG | WEIGHT: 219 LBS

## 2025-06-16 PROBLEM — Z13.31 DEPRESSION SCREEN: Status: ACTIVE | Noted: 2025-06-16

## 2025-06-16 PROCEDURE — 82270 OCCULT BLOOD FECES: CPT

## 2025-06-16 PROCEDURE — G0444 DEPRESSION SCREEN ANNUAL: CPT | Mod: 59

## 2025-06-16 PROCEDURE — 99459 PELVIC EXAMINATION: CPT

## 2025-06-16 PROCEDURE — 76830 TRANSVAGINAL US NON-OB: CPT

## 2025-06-16 PROCEDURE — 99396 PREV VISIT EST AGE 40-64: CPT

## 2025-06-17 LAB — HPV HIGH+LOW RISK DNA PNL CVX: NOT DETECTED

## 2025-06-19 LAB — CYTOLOGY CVX/VAG DOC THIN PREP: ABNORMAL

## 2025-07-24 ENCOUNTER — TRANSCRIPTION ENCOUNTER (OUTPATIENT)
Age: 60
End: 2025-07-24

## 2025-07-24 ENCOUNTER — OUTPATIENT (OUTPATIENT)
Dept: OUTPATIENT SERVICES | Facility: HOSPITAL | Age: 60
LOS: 1 days | End: 2025-07-24
Payer: COMMERCIAL

## 2025-07-24 ENCOUNTER — APPOINTMENT (OUTPATIENT)
Dept: GASTROENTEROLOGY | Facility: GI CENTER | Age: 60
End: 2025-07-24
Payer: COMMERCIAL

## 2025-07-24 ENCOUNTER — RESULT REVIEW (OUTPATIENT)
Age: 60
End: 2025-07-24

## 2025-07-24 DIAGNOSIS — Z90.89 ACQUIRED ABSENCE OF OTHER ORGANS: Chronic | ICD-10-CM

## 2025-07-24 DIAGNOSIS — Z98.84 BARIATRIC SURGERY STATUS: Chronic | ICD-10-CM

## 2025-07-24 DIAGNOSIS — Z87.59 PERSONAL HISTORY OF OTHER COMPLICATIONS OF PREGNANCY, CHILDBIRTH AND THE PUERPERIUM: Chronic | ICD-10-CM

## 2025-07-24 DIAGNOSIS — R10.13 EPIGASTRIC PAIN: ICD-10-CM

## 2025-07-24 PROCEDURE — 43239 EGD BIOPSY SINGLE/MULTIPLE: CPT

## 2025-07-24 PROCEDURE — 88342 IMHCHEM/IMCYTCHM 1ST ANTB: CPT

## 2025-07-24 PROCEDURE — 88305 TISSUE EXAM BY PATHOLOGIST: CPT

## 2025-07-24 PROCEDURE — 88305 TISSUE EXAM BY PATHOLOGIST: CPT | Mod: 26

## 2025-07-24 PROCEDURE — 88342 IMHCHEM/IMCYTCHM 1ST ANTB: CPT | Mod: 26

## 2025-07-28 LAB — SURGICAL PATHOLOGY STUDY: SIGNIFICANT CHANGE UP

## 2025-09-08 ENCOUNTER — APPOINTMENT (OUTPATIENT)
Dept: CARDIOLOGY | Facility: CLINIC | Age: 60
End: 2025-09-08

## 2025-09-12 LAB
HBA1C MFR BLD HPLC: 6.7
LDLC SERPL DIRECT ASSAY-MCNC: 82
MICROALBUMIN/CREAT 24H UR-RTO: 5
TSH SERPL-ACNC: 1.09

## 2025-09-15 ENCOUNTER — APPOINTMENT (OUTPATIENT)
Dept: ENDOCRINOLOGY | Facility: CLINIC | Age: 60
End: 2025-09-15
Payer: COMMERCIAL

## 2025-09-15 VITALS
OXYGEN SATURATION: 96 % | DIASTOLIC BLOOD PRESSURE: 70 MMHG | HEART RATE: 70 BPM | WEIGHT: 214 LBS | HEIGHT: 64 IN | BODY MASS INDEX: 36.54 KG/M2 | SYSTOLIC BLOOD PRESSURE: 130 MMHG

## 2025-09-15 DIAGNOSIS — E78.5 HYPERLIPIDEMIA, UNSPECIFIED: ICD-10-CM

## 2025-09-15 DIAGNOSIS — E04.2 NONTOXIC MULTINODULAR GOITER: ICD-10-CM

## 2025-09-15 DIAGNOSIS — E03.9 HYPOTHYROIDISM, UNSPECIFIED: ICD-10-CM

## 2025-09-15 DIAGNOSIS — E11.65 TYPE 2 DIABETES MELLITUS WITH HYPERGLYCEMIA: ICD-10-CM

## 2025-09-15 LAB — GLUCOSE BLDC GLUCOMTR-MCNC: 195

## 2025-09-15 PROCEDURE — 82962 GLUCOSE BLOOD TEST: CPT

## 2025-09-15 PROCEDURE — 99214 OFFICE O/P EST MOD 30 MIN: CPT

## 2025-09-15 RX ORDER — METFORMIN HYDROCHLORIDE 500 MG/1
500 TABLET, EXTENDED RELEASE ORAL
Qty: 360 | Refills: 1 | Status: ACTIVE | COMMUNITY
Start: 2025-09-15 | End: 1900-01-01

## (undated) DEVICE — KIT DEFENDO 4 OLY 4 PC

## (undated) DEVICE — FORCEP ENDOJAW AGTR LC W NDL 2.8MM 230CM DISP